# Patient Record
Sex: MALE | NOT HISPANIC OR LATINO | URBAN - METROPOLITAN AREA
[De-identification: names, ages, dates, MRNs, and addresses within clinical notes are randomized per-mention and may not be internally consistent; named-entity substitution may affect disease eponyms.]

---

## 2023-04-05 ENCOUNTER — INPATIENT (INPATIENT)
Facility: HOSPITAL | Age: 83
LOS: 11 days | Discharge: HOME HEALTH | DRG: 291 | End: 2023-04-17
Attending: HOSPITALIST | Admitting: HOSPITALIST
Payer: MEDICARE

## 2023-04-05 VITALS
OXYGEN SATURATION: 96 % | SYSTOLIC BLOOD PRESSURE: 131 MMHG | RESPIRATION RATE: 22 BRPM | TEMPERATURE: 98 F | DIASTOLIC BLOOD PRESSURE: 67 MMHG | WEIGHT: 289.91 LBS | HEART RATE: 55 BPM

## 2023-04-05 DIAGNOSIS — I48.92 UNSPECIFIED ATRIAL FLUTTER: ICD-10-CM

## 2023-04-05 DIAGNOSIS — I50.33 ACUTE ON CHRONIC DIASTOLIC (CONGESTIVE) HEART FAILURE: ICD-10-CM

## 2023-04-05 DIAGNOSIS — Z90.49 ACQUIRED ABSENCE OF OTHER SPECIFIED PARTS OF DIGESTIVE TRACT: Chronic | ICD-10-CM

## 2023-04-05 DIAGNOSIS — J44.9 CHRONIC OBSTRUCTIVE PULMONARY DISEASE, UNSPECIFIED: ICD-10-CM

## 2023-04-05 DIAGNOSIS — I50.9 HEART FAILURE, UNSPECIFIED: ICD-10-CM

## 2023-04-05 DIAGNOSIS — I10 ESSENTIAL (PRIMARY) HYPERTENSION: ICD-10-CM

## 2023-04-05 DIAGNOSIS — E11.9 TYPE 2 DIABETES MELLITUS WITHOUT COMPLICATIONS: ICD-10-CM

## 2023-04-05 DIAGNOSIS — R00.1 BRADYCARDIA, UNSPECIFIED: ICD-10-CM

## 2023-04-05 DIAGNOSIS — J96.21 ACUTE AND CHRONIC RESPIRATORY FAILURE WITH HYPOXIA: ICD-10-CM

## 2023-04-05 LAB
ALBUMIN SERPL ELPH-MCNC: 3.1 G/DL — LOW (ref 3.3–5.2)
ALP SERPL-CCNC: 110 U/L — SIGNIFICANT CHANGE UP (ref 40–120)
ALT FLD-CCNC: 17 U/L — SIGNIFICANT CHANGE UP
ANION GAP SERPL CALC-SCNC: 8 MMOL/L — SIGNIFICANT CHANGE UP (ref 5–17)
ANISOCYTOSIS BLD QL: SIGNIFICANT CHANGE UP
APTT BLD: 39.3 SEC — HIGH (ref 27.5–35.5)
AST SERPL-CCNC: 21 U/L — SIGNIFICANT CHANGE UP
BASE EXCESS BLDA CALC-SCNC: 19.8 MMOL/L — HIGH (ref -2–3)
BASOPHILS # BLD AUTO: 0.03 K/UL — SIGNIFICANT CHANGE UP (ref 0–0.2)
BASOPHILS NFR BLD AUTO: 0.4 % — SIGNIFICANT CHANGE UP (ref 0–2)
BILIRUB SERPL-MCNC: 0.6 MG/DL — SIGNIFICANT CHANGE UP (ref 0.4–2)
BLOOD GAS COMMENTS ARTERIAL: SIGNIFICANT CHANGE UP
BUN SERPL-MCNC: 21.7 MG/DL — HIGH (ref 8–20)
CALCIUM SERPL-MCNC: 8.7 MG/DL — SIGNIFICANT CHANGE UP (ref 8.4–10.5)
CHLORIDE SERPL-SCNC: 94 MMOL/L — LOW (ref 96–108)
CO2 SERPL-SCNC: 37 MMOL/L — HIGH (ref 22–29)
CREAT SERPL-MCNC: 1.01 MG/DL — SIGNIFICANT CHANGE UP (ref 0.5–1.3)
EGFR: 74 ML/MIN/1.73M2 — SIGNIFICANT CHANGE UP
EOSINOPHIL # BLD AUTO: 0.11 K/UL — SIGNIFICANT CHANGE UP (ref 0–0.5)
EOSINOPHIL NFR BLD AUTO: 1.4 % — SIGNIFICANT CHANGE UP (ref 0–6)
GAS PNL BLDA: SIGNIFICANT CHANGE UP
GLUCOSE BLDC GLUCOMTR-MCNC: 125 MG/DL — HIGH (ref 70–99)
GLUCOSE SERPL-MCNC: 100 MG/DL — HIGH (ref 70–99)
HCO3 BLDA-SCNC: 46 MMOL/L — CRITICAL HIGH (ref 21–28)
HCT VFR BLD CALC: 41.3 % — SIGNIFICANT CHANGE UP (ref 39–50)
HGB BLD-MCNC: 13.1 G/DL — SIGNIFICANT CHANGE UP (ref 13–17)
HOROWITZ INDEX BLDA+IHG-RTO: SIGNIFICANT CHANGE UP
HYPOCHROMIA BLD QL: SLIGHT — SIGNIFICANT CHANGE UP
IMM GRANULOCYTES NFR BLD AUTO: 0.3 % — SIGNIFICANT CHANGE UP (ref 0–0.9)
INR BLD: 1.83 RATIO — HIGH (ref 0.88–1.16)
LYMPHOCYTES # BLD AUTO: 1.6 K/UL — SIGNIFICANT CHANGE UP (ref 1–3.3)
LYMPHOCYTES # BLD AUTO: 20.9 % — SIGNIFICANT CHANGE UP (ref 13–44)
MACROCYTES BLD QL: SIGNIFICANT CHANGE UP
MANUAL SMEAR VERIFICATION: SIGNIFICANT CHANGE UP
MCHC RBC-ENTMCNC: 31.7 GM/DL — LOW (ref 32–36)
MCHC RBC-ENTMCNC: 34.1 PG — HIGH (ref 27–34)
MCV RBC AUTO: 107.6 FL — HIGH (ref 80–100)
MONOCYTES # BLD AUTO: 0.6 K/UL — SIGNIFICANT CHANGE UP (ref 0–0.9)
MONOCYTES NFR BLD AUTO: 7.9 % — SIGNIFICANT CHANGE UP (ref 2–14)
NEUTROPHILS # BLD AUTO: 5.28 K/UL — SIGNIFICANT CHANGE UP (ref 1.8–7.4)
NEUTROPHILS NFR BLD AUTO: 69.1 % — SIGNIFICANT CHANGE UP (ref 43–77)
NT-PROBNP SERPL-SCNC: 1652 PG/ML — HIGH (ref 0–300)
PCO2 BLDA: 89 MMHG — CRITICAL HIGH (ref 35–48)
PH BLDA: 7.32 — LOW (ref 7.35–7.45)
PLAT MORPH BLD: NORMAL — SIGNIFICANT CHANGE UP
PLATELET # BLD AUTO: 99 K/UL — LOW (ref 150–400)
PO2 BLDA: 90 MMHG — SIGNIFICANT CHANGE UP (ref 83–108)
POLYCHROMASIA BLD QL SMEAR: SLIGHT — SIGNIFICANT CHANGE UP
POTASSIUM SERPL-MCNC: 4 MMOL/L — SIGNIFICANT CHANGE UP (ref 3.5–5.3)
POTASSIUM SERPL-SCNC: 4 MMOL/L — SIGNIFICANT CHANGE UP (ref 3.5–5.3)
PROT SERPL-MCNC: 6.9 G/DL — SIGNIFICANT CHANGE UP (ref 6.6–8.7)
PROTHROM AB SERPL-ACNC: 21.3 SEC — HIGH (ref 10.5–13.4)
RBC # BLD: 3.84 M/UL — LOW (ref 4.2–5.8)
RBC # FLD: 13.6 % — SIGNIFICANT CHANGE UP (ref 10.3–14.5)
RBC BLD AUTO: ABNORMAL
SAO2 % BLDA: 98.1 % — HIGH (ref 94–98)
SARS-COV-2 RNA SPEC QL NAA+PROBE: SIGNIFICANT CHANGE UP
SODIUM SERPL-SCNC: 139 MMOL/L — SIGNIFICANT CHANGE UP (ref 135–145)
TROPONIN T SERPL-MCNC: 0.02 NG/ML — SIGNIFICANT CHANGE UP (ref 0–0.06)
WBC # BLD: 7.64 K/UL — SIGNIFICANT CHANGE UP (ref 3.8–10.5)
WBC # FLD AUTO: 7.64 K/UL — SIGNIFICANT CHANGE UP (ref 3.8–10.5)

## 2023-04-05 PROCEDURE — 71045 X-RAY EXAM CHEST 1 VIEW: CPT | Mod: 26

## 2023-04-05 PROCEDURE — 93010 ELECTROCARDIOGRAM REPORT: CPT

## 2023-04-05 PROCEDURE — 99223 1ST HOSP IP/OBS HIGH 75: CPT

## 2023-04-05 PROCEDURE — 93306 TTE W/DOPPLER COMPLETE: CPT | Mod: 26

## 2023-04-05 PROCEDURE — 99291 CRITICAL CARE FIRST HOUR: CPT

## 2023-04-05 RX ORDER — GLUCAGON INJECTION, SOLUTION 0.5 MG/.1ML
4 INJECTION, SOLUTION SUBCUTANEOUS ONCE
Refills: 0 | Status: COMPLETED | OUTPATIENT
Start: 2023-04-05 | End: 2023-04-05

## 2023-04-05 RX ORDER — INSULIN LISPRO 100/ML
VIAL (ML) SUBCUTANEOUS AT BEDTIME
Refills: 0 | Status: ACTIVE | OUTPATIENT
Start: 2023-04-05 | End: 2024-03-03

## 2023-04-05 RX ORDER — FUROSEMIDE 40 MG
80 TABLET ORAL EVERY 12 HOURS
Refills: 0 | Status: DISCONTINUED | OUTPATIENT
Start: 2023-04-05 | End: 2023-04-09

## 2023-04-05 RX ORDER — ALBUTEROL 90 UG/1
2 AEROSOL, METERED ORAL
Refills: 0 | DISCHARGE

## 2023-04-05 RX ORDER — APIXABAN 2.5 MG/1
5 TABLET, FILM COATED ORAL
Refills: 0 | Status: DISCONTINUED | OUTPATIENT
Start: 2023-04-05 | End: 2023-04-06

## 2023-04-05 RX ORDER — FUROSEMIDE 40 MG
80 TABLET ORAL ONCE
Refills: 0 | Status: COMPLETED | OUTPATIENT
Start: 2023-04-05 | End: 2023-04-05

## 2023-04-05 RX ORDER — APIXABAN 2.5 MG/1
1 TABLET, FILM COATED ORAL
Refills: 0 | DISCHARGE

## 2023-04-05 RX ORDER — GLUCAGON INJECTION, SOLUTION 0.5 MG/.1ML
1 INJECTION, SOLUTION SUBCUTANEOUS ONCE
Refills: 0 | Status: ACTIVE | OUTPATIENT
Start: 2023-04-05 | End: 2024-03-03

## 2023-04-05 RX ORDER — DEXTROSE 50 % IN WATER 50 %
25 SYRINGE (ML) INTRAVENOUS ONCE
Refills: 0 | Status: ACTIVE | OUTPATIENT
Start: 2023-04-05

## 2023-04-05 RX ORDER — IPRATROPIUM/ALBUTEROL SULFATE 18-103MCG
3 AEROSOL WITH ADAPTER (GRAM) INHALATION
Refills: 0 | Status: ACTIVE | OUTPATIENT
Start: 2023-04-05 | End: 2024-03-03

## 2023-04-05 RX ORDER — INSULIN LISPRO 100/ML
VIAL (ML) SUBCUTANEOUS
Refills: 0 | Status: ACTIVE | OUTPATIENT
Start: 2023-04-05 | End: 2024-03-03

## 2023-04-05 RX ORDER — ONDANSETRON 8 MG/1
8 TABLET, FILM COATED ORAL ONCE
Refills: 0 | Status: COMPLETED | OUTPATIENT
Start: 2023-04-05 | End: 2023-04-05

## 2023-04-05 RX ORDER — LOSARTAN POTASSIUM 100 MG/1
25 TABLET, FILM COATED ORAL DAILY
Refills: 0 | Status: DISCONTINUED | OUTPATIENT
Start: 2023-04-05 | End: 2023-04-07

## 2023-04-05 RX ORDER — BUMETANIDE 0.25 MG/ML
1 INJECTION INTRAMUSCULAR; INTRAVENOUS EVERY 12 HOURS
Refills: 0 | Status: DISCONTINUED | OUTPATIENT
Start: 2023-04-05 | End: 2023-04-05

## 2023-04-05 RX ORDER — DEXTROSE 50 % IN WATER 50 %
15 SYRINGE (ML) INTRAVENOUS ONCE
Refills: 0 | Status: ACTIVE | OUTPATIENT
Start: 2023-04-05 | End: 2024-03-03

## 2023-04-05 RX ORDER — CALCIUM GLUCONATE 100 MG/ML
2 VIAL (ML) INTRAVENOUS ONCE
Refills: 0 | Status: COMPLETED | OUTPATIENT
Start: 2023-04-05 | End: 2023-04-05

## 2023-04-05 RX ORDER — TAMSULOSIN HYDROCHLORIDE 0.4 MG/1
1 CAPSULE ORAL
Refills: 0 | DISCHARGE

## 2023-04-05 RX ORDER — TAMSULOSIN HYDROCHLORIDE 0.4 MG/1
0.4 CAPSULE ORAL AT BEDTIME
Refills: 0 | Status: DISCONTINUED | OUTPATIENT
Start: 2023-04-05 | End: 2023-04-06

## 2023-04-05 RX ORDER — SODIUM CHLORIDE 9 MG/ML
1000 INJECTION, SOLUTION INTRAVENOUS
Refills: 0 | Status: ACTIVE | OUTPATIENT
Start: 2023-04-05 | End: 2024-03-03

## 2023-04-05 RX ORDER — GLIMEPIRIDE 1 MG
1 TABLET ORAL
Refills: 0 | DISCHARGE

## 2023-04-05 RX ORDER — IPRATROPIUM/ALBUTEROL SULFATE 18-103MCG
3 AEROSOL WITH ADAPTER (GRAM) INHALATION EVERY 6 HOURS
Refills: 0 | Status: ACTIVE | OUTPATIENT
Start: 2023-04-05 | End: 2024-03-03

## 2023-04-05 RX ORDER — DEXTROSE 50 % IN WATER 50 %
12.5 SYRINGE (ML) INTRAVENOUS ONCE
Refills: 0 | Status: ACTIVE | OUTPATIENT
Start: 2023-04-05

## 2023-04-05 RX ADMIN — Medication 3 MILLILITER(S): at 21:04

## 2023-04-05 RX ADMIN — APIXABAN 5 MILLIGRAM(S): 2.5 TABLET, FILM COATED ORAL at 17:56

## 2023-04-05 RX ADMIN — TAMSULOSIN HYDROCHLORIDE 0.4 MILLIGRAM(S): 0.4 CAPSULE ORAL at 21:42

## 2023-04-05 RX ADMIN — GLUCAGON INJECTION, SOLUTION 4 MILLIGRAM(S): 0.5 INJECTION, SOLUTION SUBCUTANEOUS at 13:24

## 2023-04-05 RX ADMIN — Medication 80 MILLIGRAM(S): at 17:55

## 2023-04-05 RX ADMIN — Medication 200 GRAM(S): at 13:24

## 2023-04-05 RX ADMIN — Medication 3 MILLILITER(S): at 16:43

## 2023-04-05 RX ADMIN — ONDANSETRON 8 MILLIGRAM(S): 8 TABLET, FILM COATED ORAL at 13:19

## 2023-04-05 NOTE — ED ADULT NURSE NOTE - CHIEF COMPLAINT
Kick Counts in Pregnancy   WHAT YOU NEED TO KNOW:   What do I need to know about kick counts? Kick counts measure how much your baby is moving in your womb  A kick from your baby can be felt as a twist, turn, swish, roll, or jab  It is common to feel your baby kicking at 26 to 28 weeks of pregnancy  You may feel your baby kick as early as 20 weeks of pregnancy  You may want to start counting at 28 weeks  Why should I measure kick counts? Your baby's movement may provide information about your baby's health  He or she may move less, or not at all, if there are problems  Your baby may move less if he or she is not getting enough oxygen or nutrition from the placenta  Do not smoke while you are pregnant  Smoking decreases the amount of oxygen that gets to your baby  Talk to your healthcare provider if you need help to quit smoking  Tell your healthcare provider as soon as you feel a change in your baby's movements  When do I measure kick counts? · Measure kick counts at the same time every day  · Measure kick counts when your baby is awake and most active  Your baby may be most active in the evening  How do I measure kick counts? Check that your baby is awake before you measure kick counts  You can wake up your baby by lightly pushing on your belly, walking, or drinking something cold  Your healthcare provider may tell you different ways to measure kick counts  You may be told to do the following:  · Use a chart or clock to keep track of the time you start and finish counting  · Sit in a chair or lie on your left side  · Place your hands on the largest part of your belly  · Count until you reach 10 kicks  Write down how much time it takes to count 10 kicks  · It may take 30 minutes to 2 hours to count 10 kicks  It should not take more than 2 hours to count 10 kicks  When should I contact my healthcare provider? · You feel a change in the number of kicks or movements of your baby  · You feel fewer than 10 kicks within 2 hours  · You have questions or concerns about your baby's movements  CARE AGREEMENT:   You have the right to help plan your care  Learn about your health condition and how it may be treated  Discuss treatment options with your healthcare providers to decide what care you want to receive  You always have the right to refuse treatment  The above information is an  only  It is not intended as medical advice for individual conditions or treatments  Talk to your doctor, nurse or pharmacist before following any medical regimen to see if it is safe and effective for you  © Copyright [employer] 2021 Information is for End User's use only and may not be sold, redistributed or otherwise used for commercial purposes   All illustrations and images included in CareNotes® are the copyrighted property of [employer]  or [address] [place] [address] The patient is a 83y Male complaining of swelling of lower extremities.

## 2023-04-05 NOTE — ED PROVIDER NOTE - NS ED ROS FT
Gen: denies fever, chills, fatigue, weight loss  Skin: denies rashes, laceration, bruising  HEENT: denies visual changes, ear pain, nasal congestion, throat pain  Respiratory: +SOB, +HOLT. Denies cough, wheezing  Cardiovascular: +LE Edema. denies chest pain, palpitations, diaphoresis  GI: denies abdominal pain, n/v/d  : denies dysuria, frequency, urgency, bowel/bladder incontinence  MSK: denies joint swelling/pain, back pain, neck pain  Neuro: denies headache, dizziness, weakness, numbness  Psych: denies anxiety, depression, SI/HI, visual/auditory hallucinations

## 2023-04-05 NOTE — H&P ADULT - NSHPLABSRESULTS_GEN_ALL_CORE
Personally reviewed  EKG showing Atrial Flutter with variable VR  Chest X-Ray with cardiomegaly with congestive changes

## 2023-04-05 NOTE — ED ADULT TRIAGE NOTE - CHIEF COMPLAINT QUOTE
C/O increasing bilateral lower extremity swelling for the past few days. PT has had an increase in his Lasix but has had no relief. Denies Increase SOB or chest pain. On 4L NC at baseline.

## 2023-04-05 NOTE — H&P ADULT - NSHPPHYSICALEXAM_GEN_ALL_CORE
OBJECTIVE:  Vital Signs Last 24 Hrs  T(C): 36.5 (05 Apr 2023 10:53), Max: 36.5 (05 Apr 2023 10:53)  T(F): 97.7 (05 Apr 2023 10:53), Max: 97.7 (05 Apr 2023 10:53)  HR: 38 (05 Apr 2023 14:23) (33 - 55)  BP: 111/62 (05 Apr 2023 14:23) (95/52 - 131/67)   RR: 24 (05 Apr 2023 14:23) (22 - 27)  SpO2: 96% (05 Apr 2023 14:23) (95% - 97%)    Parameters below as of 05 Apr 2023 14:23  Patient On (Oxygen Delivery Method): nasal cannula  O2 Flow (L/min): 4      PHYSICAL EXAMINATION  General: Obese male, sitting up on stretcher, fatigued  HEENT:  5LNC. EOMI  NECK:  Supple  CVS: Bradycardiac  RESP:  coarse breath sounds, rhonchi diffusely  GI:  Soft protuberant nontender BS+  : So suprapubic tenderness  MSK: Bilateral LE edema. Moves all extremities  CNS:  AOx3. No gross focal or global deficit though somewhat slow to respond  INTEG:  warm dry   PSYCH:  Fatigued

## 2023-04-05 NOTE — PATIENT PROFILE ADULT - FALL HARM RISK - FACTORS
Impaired gait/IV and/or equipment tethered to patient/Orthostatic hypotension/Other medical problems

## 2023-04-05 NOTE — ED PROVIDER NOTE - CARE PLAN
Principal Discharge DX:	CHF exacerbation  Secondary Diagnosis:	New onset atrial flutter  Secondary Diagnosis:	Bradycardia   1

## 2023-04-05 NOTE — ED PROVIDER NOTE - OBJECTIVE STATEMENT
83-year-old male PMHx CHF, COPD on 2–3 L O2, DM presents to ED complaining of lower extremity edema and HOLT.  Patient reports increasing leg swelling over the past few weeks however states it has been much worse the past 2 to 3 days.  States his daughter brought him to [facility] office this morning due to increased leg swelling and was told he should go to the hospital because he is already on 80 mg of Lasix daily and would likely require admission for a few days for diuresis.  States that patient was also noted to have a heart rate in the 40s a few weeks ago, monitors it via pulse ox at home, states his cardiologist is aware.  Denies fever, chills, sputum production, CP, diaphoresis, N/V/D, ABD pain.  Cardiologist: Dr. Pang 83-year-old male PMHx CHF, COPD on 2–3 L O2, DM, DVT on eliquis presents to ED complaining of lower extremity edema and HOLT.  Patient reports increasing leg swelling over the past few weeks however states it has been much worse the past 2 to 3 days.  States his daughter brought him to [facility] office this morning due to increased leg swelling and was told he should go to the hospital because he is already on 80 mg of Lasix daily and would likely require admission for a few days for diuresis.  States that patient was also noted to have a heart rate in the 40s a few weeks ago, monitors it via pulse ox at home, states his cardiologist is aware.  Denies fever, chills, sputum production, CP, diaphoresis, N/V/D, ABD pain.  Cardiologist: Dr. Pang

## 2023-04-05 NOTE — CONSULT NOTE ADULT - ASSESSMENT
84 y/o male with h/o chronic HfpEF ACC/AHA stage C, COPD on home O2 (4 LPM via NC) since July 2022, former smoker (stopped 7/2022), Dm2, DVT on Eliquis who was referred from Dr. Booker's office due to ADHF. Upon arrival to the ED he was found to have newly diagnosed Aflutter with variable block (VR 30-40s). His BP was stable and he denied symptoms associated to bradycardia. His labs were remarkable for elevated pBNP, normal trop, thrombocytopenia and elevated CO2. His CxR showed pulmonary congestion. He was given IV lasix, glucagon and calcium. His AV node blocker agents were held.

## 2023-04-05 NOTE — H&P ADULT - ASSESSMENT
83 year old male with PMH HTN, T2DM, CHFpEF, COPD on 2LNC, DVT on Apixaban presented with worsening dyspnea and pedal edema.  EKG with Atrial flutter with slow VR and Chest X-Ray with congestion    Acute on Chronic CHF  Reported pEF  - Admit to telemetry  - I/O, Daily weight, fluid and salt restriction  - Furosemide 80mg IV q12  - Metolazone 5mg daily x 3  - TTE  - Heart Failure Consult    Atrial Flutter  Bradycardia  - Monitor  - Hold Metoprolol  - Apixaban    Acute on chronic hypoxic respiratory failure  COPD  Elevated HCO3  -   - Supplemental O2  - DuoNebs  - Check ABG    HTN  - Add losartan  -   T2DM 83 year old male with PMH HTN, T2DM, CHFpEF, COPD on 2LNC, DVT on Apixaban presented with worsening dyspnea and pedal edema.  EKG with Atrial flutter with slow VR and Chest X-Ray with congestion. BNP 1652      Acute on Chronic CHF  Reported pEF  - Admit to telemetry  - I/O, Daily weight, fluid and salt restriction  - Furosemide 80mg IV q12  - Metolazone 5mg daily x 3  - ARb  - Consider SLGT2i  - TTE  - Heart Failure Consult    Atrial Flutter  Bradycardia  - Monitor  - Hold Metoprolol  - Apixaban    Acute on chronic hypoxic respiratory failure  COPD  Elevated HCO3  -   - Supplemental O2  - DuoNebs  - Check ABG    HTN  - Add losartan  - Metoprolol on hold due to bradycardia    T2DM  - Carbohydrate consistent diet  - Hold home OHA  - BGM with SSI  - A1c in am    VTE prophylaxis  - Already on Apixaban  Incentive Spirometry  OOB    Guarded Prognosis  Palliative Consult

## 2023-04-05 NOTE — ED PROVIDER NOTE - NSICDXPASTMEDICALHX_GEN_ALL_CORE_FT
PAST MEDICAL HISTORY:  Congestive heart failure (CHF)     COPD (chronic obstructive pulmonary disease)     DM (diabetes mellitus)

## 2023-04-05 NOTE — H&P ADULT - HISTORY OF PRESENT ILLNESS
83 year old male with PMH HTN, T2DM, CHFpEF, COPD on 2LNC, DVT on Apixaban presented with worsening dyspnea and pedal edema. Also with low heart rate recently. He states he's been worse for past couple days although he's been sick for a couple of months and [facility]  Denies any dizziness or chest pain but has cough productive of white phlegm. Denies any dietary indiscretion or medications non-compliance.

## 2023-04-05 NOTE — H&P ADULT - NSICDXPASTMEDICALHX_GEN_ALL_CORE_FT
PAST MEDICAL HISTORY:  BPH (benign prostatic hyperplasia)     Congestive heart failure (CHF)     COPD (chronic obstructive pulmonary disease)     DM (diabetes mellitus)     HTN (hypertension)     
no

## 2023-04-05 NOTE — ED PROVIDER NOTE - PROGRESS NOTE DETAILS
HR 36 on monitor. EKG aflutter. pt moved to critical care room on monitor. Spoke with Dr. Griffith, recommending holding AV theresa blocking agents, requesting HF consult. States Dr. Pang from EP will see pt in AM. Spoke with heart failure cardiologist Dr. Carreon, agrees with plan thus far, will see pt in afternoon today.

## 2023-04-05 NOTE — H&P ADULT - NSHPSOCIALHISTORY_GEN_ALL_CORE
Pierre has 1 daughter - moved in with her 2 months ago  Quit smoking last year, Doesn't drink or do drugs

## 2023-04-05 NOTE — ED PROVIDER NOTE - CLINICAL SUMMARY MEDICAL DECISION MAKING FREE TEXT BOX
83-year-old male presenting with lower extremity edema and HOLT.  [facility] in from [facility] for diuresis.  Labs and CXR ordered.  Given 80 mg IV Lasix in the ED 83-year-old male presenting with lower extremity edema and HOLT.  Sent in from [facility] for diuresis.  Labs and CXR ordered.  Given 80 mg IV Lasix in the ED. HR 36 on monitor. EKG aflutter, new onset as per [facility]. pt moved to critical care room on monitor and given glucagon and calcium gluconate. Spoke with Dr. Griffith, recommending holding AV theresa blocking agents, requesting HF consult. States Dr. Pang from EP will see pt in AM. Dr. Carreon Heart failure cardiology to see pt today. Labs without emergent findings, CXR shows central congestion. admitted to hospitalist for further management

## 2023-04-05 NOTE — CONSULT NOTE ADULT - PROBLEM SELECTOR RECOMMENDATION 9
Multiple recent HF related hospitalizations. Could be exacerbated by new onset aflutter/bradycardia.   Clinically hypervolemic  Agree with TTE  Diuretics: lasix 80mg IV BID. Low threshold to switch to Bumex. Will consider adding Diamox based on response.   GDMT: plan to add SGLT2+MRA  Would benefit from CardioMEMs to avoid recurrent hospitalizations  Recommend rhythm control

## 2023-04-05 NOTE — PATIENT PROFILE ADULT - FALL HARM RISK - HARM RISK INTERVENTIONS
Assistance with ambulation/Assistance OOB with selected safe patient handling equipment/Communicate Risk of Fall with Harm to all staff/Discuss with provider need for PT consult/Monitor gait and stability/Provide patient with walking aids - walker, cane, crutches/Reinforce activity limits and safety measures with patient and family/Sit up slowly, dangle for a short time, stand at bedside before walking/Tailored Fall Risk Interventions/Visual Cue: Yellow wristband and red socks/Bed in lowest position, wheels locked, appropriate side rails in place/Call bell, personal items and telephone in reach/Instruct patient to call for assistance before getting out of bed or chair/Non-slip footwear when patient is out of bed/[place] to call system/Physically safe environment - no spills, clutter or unnecessary equipment/Purposeful Proactive Rounding/Room/bathroom lighting operational, light cord in reach

## 2023-04-05 NOTE — ED ADULT NURSE NOTE - OBJECTIVE STATEMENT
a&ox4  sent by [facility] office this AM s/p "bradycardic, worsening cough, short of breath and for diuresis"  uses 2L NC at baseline ; hx copd , 4+ BLE    takes 80mg lasix daily last dose 40mg this AM a&ox4  sent by [facility] office this AM for admission daughter reports pt was bradycardic, worsening cough, short of breath and needs diuresis  on arrival pt bradycardic hr 30-40's, asymptomatic ; + wet cough and congestion  uses 4L NC at baseline ; hx copd , 4+ BLE    takes 80mg lasix daily last dose 40mg this AM  pt reports took "2 pressure meds not sure which one"

## 2023-04-05 NOTE — ED PROVIDER NOTE - CRITICAL CARE ATTENDING CONTRIBUTION TO CARE
I have personally provided _45__ minutes of critical care time exclusive of time spent on separately billable procedures. Time includes review of laboratory data, radiology results, discussion with consultants, and monitoring for potential decompensation. Interventions were performed as documented above    This was a shared visit with JORGE A. I reviewed and verified the documentation and independently performed the documented history/exam/mdm.    Elderly male on home o2 for copd with known CHF now with worsening edema, SOB, HOLT, and noted braducardia; on exam, renetta to 30s, aflutter; rales b/l lung fields; abd soft nt nd; +2+pedal edema; lasix for fluid overload; glucagon and calcium to try to reverse current meds; otherwise hemodynamically stable/acceptable; cards consulted; TBA stepdown unit

## 2023-04-05 NOTE — CONSULT NOTE ADULT - SUBJECTIVE AND OBJECTIVE BOX
HPI  84 y/o male with h/o chronic HfpEF ACC/AHA stage C, COPD on home O2 (4 LPM via NC) since July 2022, former smoker (stopped 7/2022), Dm2, DVT on Eliquis who was referred from Dr. Booker's office due to worsening Le edema and HOLT. Upon arrival to the ED, he was found to have aflutter with variable block VR 30-40s. His BP was stable and the pt denied symptoms related to bradycardia. He reports cough with phlegm w/o fever or chills.  This is his 3rd or 4th hospitalization in the past 12 month. He has been admitted multiple times to [facility].     Labs upon admission remarkable for elevated pBNP, normal trop, thrombocytopenia and elevated cO2. He received IV lasix 80mg, glucagon and calcium while in the ED.     Medications:  albuterol/ipratropium for Nebulization 3 milliLiter(s) Nebulizer every 6 hours  albuterol/ipratropium for Nebulization 3 milliLiter(s) Nebulizer every 3 hours PRN  apixaban 5 milliGRAM(s) Oral two times a day  dextrose 5%. 1000 milliLiter(s) IV Continuous <Continuous>  dextrose 5%. 1000 milliLiter(s) IV Continuous <Continuous>  dextrose 50% Injectable 25 Gram(s) IV Push once  dextrose 50% Injectable 12.5 Gram(s) IV Push once  dextrose 50% Injectable 25 Gram(s) IV Push once  dextrose Oral Gel 15 Gram(s) Oral once PRN  furosemide   Injectable 80 milliGRAM(s) IV Push every 12 hours  glucagon  Injectable 1 milliGRAM(s) IntraMuscular once  insulin lispro (ADMELOG) corrective regimen sliding scale   SubCutaneous three times a day before meals  insulin lispro (ADMELOG) corrective regimen sliding scale   SubCutaneous at bedtime  losartan 25 milliGRAM(s) Oral daily  metolazone 5 milliGRAM(s) Oral daily  tamsulosin 0.4 milliGRAM(s) Oral at bedtime    Vitals:  T(C): 36.5 (04-05-23 @ 10:53), Max: 36.5 (04-05-23 @ 10:53)  HR: 41 (04-05-23 @ 19:55) (33 - 55)  BP: 112/54 (04-05-23 @ 19:55) (95/52 - 131/67)  BP(mean): 70 (04-05-23 @ 17:53) (70 - 70)  ABP: --  ABP(mean): --  RR: 20 (04-05-23 @ 19:55) (20 - 27)  SpO2: 91% (04-05-23 @ 19:55) (91% - 98%)  Wt(kg): --  CVP(cm H2O): --  CO: --  CI: --  PA: --  PA(mean): --  PCWP: --  SVR: --  PVR: --    Daily     Daily     Weight (kg): 131.5 (04-05 @ 10:53)    I&O's Summary    05 Apr 2023 07:01  -  05 Apr 2023 20:23  --------------------------------------------------------  IN: 240 mL / OUT: 0 mL / NET: 240 mL        Physical Exam:  Appearance: No Acute Distress  HEENT: JVP up to jaw level  Cardiovascular: bradycardia, no murmurs noted  Respiratory: decreased breath sounds throughout, minimal exp wheezing  Gastrointestinal: Soft, Non-tender, non-distended	  Skin: no skin lesions  Neurologic: Non-focal  Extremities: +2 b/l Le edema  Psychiatry: A & O x 3, Mood & affect appropriate      Labs:                        13.1   7.64  )-----------( 99       ( 05 Apr 2023 01:06 )             41.3     04-05    139  |  94<L>  |  21.7<H>  ----------------------------<  100<H>  4.0   |  37.0<H>  |  1.01    Ca    8.7      05 Apr 2023 01:06    TPro  6.9  /  Alb  3.1<L>  /  TBili  0.6  /  DBili  x   /  AST  21  /  ALT  17  /  AlkPhos  110  04-05      PT/INR - ( 05 Apr 2023 01:06 )   PT: 21.3 sec;   INR: 1.83 ratio         PTT - ( 05 Apr 2023 01:06 )  PTT:39.3 sec  CARDIAC MARKERS ( 05 Apr 2023 01:06 )  x     / 0.02 ng/mL / x     / x     / x                      TELEMETRY:    [ ] Echocardiogram:   [facility] Advanced Heart Failure  [address] [place] [address] Office Phone: [phone]/Fax: [phone]  Service/On Call Phone [phone]      Ref Provider: Dr. Kelley Booker ([facility])    HPI  84 y/o male with h/o chronic HfpEF ACC/AHA stage C, COPD on home O2 (4 LPM via NC) since July 2022, former smoker (stopped 7/2022), Dm2, DVT on Eliquis who was referred from Dr. Booker's office due to worsening Le edema and HOLT. Upon arrival to the ED, he was found to have aflutter with variable block VR 30-40s. His BP was stable and the pt denied symptoms related to bradycardia. He reports cough with phlegm w/o fever or chills.  This is his 3rd or 4th hospitalization in the past 12 month. He has been admitted multiple times to [facility].     Labs upon admission remarkable for elevated pBNP, normal trop, thrombocytopenia and elevated cO2. He received IV lasix 80mg, glucagon and calcium while in the ED.     Medications:  albuterol/ipratropium for Nebulization 3 milliLiter(s) Nebulizer every 6 hours  albuterol/ipratropium for Nebulization 3 milliLiter(s) Nebulizer every 3 hours PRN  apixaban 5 milliGRAM(s) Oral two times a day  dextrose 5%. 1000 milliLiter(s) IV Continuous <Continuous>  dextrose 5%. 1000 milliLiter(s) IV Continuous <Continuous>  dextrose 50% Injectable 25 Gram(s) IV Push once  dextrose 50% Injectable 12.5 Gram(s) IV Push once  dextrose 50% Injectable 25 Gram(s) IV Push once  dextrose Oral Gel 15 Gram(s) Oral once PRN  furosemide   Injectable 80 milliGRAM(s) IV Push every 12 hours  glucagon  Injectable 1 milliGRAM(s) IntraMuscular once  insulin lispro (ADMELOG) corrective regimen sliding scale   SubCutaneous three times a day before meals  insulin lispro (ADMELOG) corrective regimen sliding scale   SubCutaneous at bedtime  losartan 25 milliGRAM(s) Oral daily  metolazone 5 milliGRAM(s) Oral daily  tamsulosin 0.4 milliGRAM(s) Oral at bedtime    Vitals:  T(C): 36.5 (04-05-23 @ 10:53), Max: 36.5 (04-05-23 @ 10:53)  HR: 41 (04-05-23 @ 19:55) (33 - 55)  BP: 112/54 (04-05-23 @ 19:55) (95/52 - 131/67)  BP(mean): 70 (04-05-23 @ 17:53) (70 - 70)  ABP: --  ABP(mean): --  RR: 20 (04-05-23 @ 19:55) (20 - 27)  SpO2: 91% (04-05-23 @ 19:55) (91% - 98%)  Wt(kg): --  CVP(cm H2O): --  CO: --  CI: --  PA: --  PA(mean): --  PCWP: --  SVR: --  PVR: --    Daily     Daily     Weight (kg): 131.5 (04-05 @ 10:53)    I&O's Summary    05 Apr 2023 07:01  -  05 Apr 2023 20:23  --------------------------------------------------------  IN: 240 mL / OUT: 0 mL / NET: 240 mL        Physical Exam:  Appearance: No Acute Distress  HEENT: JVP up to jaw level  Cardiovascular: bradycardia, no murmurs noted  Respiratory: decreased breath sounds throughout, minimal exp wheezing  Gastrointestinal: Soft, Non-tender, non-distended	  Skin: no skin lesions  Neurologic: Non-focal  Extremities: +2 b/l Le edema  Psychiatry: A & O x 3, Mood & affect appropriate      Labs:                        13.1   7.64  )-----------( 99       ( 05 Apr 2023 01:06 )             41.3     04-05    139  |  94<L>  |  21.7<H>  ----------------------------<  100<H>  4.0   |  37.0<H>  |  1.01    Ca    8.7      05 Apr 2023 01:06    TPro  6.9  /  Alb  3.1<L>  /  TBili  0.6  /  DBili  x   /  AST  21  /  ALT  17  /  AlkPhos  110  04-05      PT/INR - ( 05 Apr 2023 01:06 )   PT: 21.3 sec;   INR: 1.83 ratio         PTT - ( 05 Apr 2023 01:06 )  PTT:39.3 sec  CARDIAC MARKERS ( 05 Apr 2023 01:06 )  x     / 0.02 ng/mL / x     / x     / x                      TELEMETRY:    [ ] Echocardiogram:

## 2023-04-05 NOTE — ED CLERICAL - CLERICAL COMMENTS
[facility] called for consult [facility] called for consult, and also advanced heart failure service consulted

## 2023-04-05 NOTE — ED PROVIDER NOTE - PHYSICAL EXAMINATION
Gen: No acute distress, non toxic  HENT: NCAT, Mucous membranes moist, Oropharynx without exudates, uvula midline  Eyes: pink conjunctivae, EOMI, PERRL  CV: RRR, nl s1/s2. 3+ pitting edema b/l LE  Resp: on 3L O2 via NC. diminished breath sounds, crackles at bases.   GI: Abdomen soft, NT, ND. No rebound, no guarding  : No CVAT  Neuro: A&O x 3, CN II-XII intact, sensorimotor intact without deficits   MSK: No spine or joint tenderness to palpation, Full ROM ext x 4  Skin: No rashes. intact and perfused.

## 2023-04-05 NOTE — CONSULT NOTE ADULT - PROBLEM SELECTOR RECOMMENDATION 2
New onset  Variable block  AVN blocker agents HELD  Agree with EP c/s  Recommend rhythm control if possible  AC: eliquis

## 2023-04-06 DIAGNOSIS — E66.2 MORBID (SEVERE) OBESITY WITH ALVEOLAR HYPOVENTILATION: ICD-10-CM

## 2023-04-06 DIAGNOSIS — J96.21 ACUTE AND CHRONIC RESPIRATORY FAILURE WITH HYPOXIA: ICD-10-CM

## 2023-04-06 LAB
A1C WITH ESTIMATED AVERAGE GLUCOSE RESULT: 5.9 % — HIGH (ref 4–5.6)
ALBUMIN SERPL ELPH-MCNC: 3.4 G/DL — SIGNIFICANT CHANGE UP (ref 3.3–5.2)
ALP SERPL-CCNC: 119 U/L — SIGNIFICANT CHANGE UP (ref 40–120)
ALT FLD-CCNC: 17 U/L — SIGNIFICANT CHANGE UP
ANION GAP SERPL CALC-SCNC: 8 MMOL/L — SIGNIFICANT CHANGE UP (ref 5–17)
APPEARANCE UR: CLEAR — SIGNIFICANT CHANGE UP
APTT BLD: 37.3 SEC — HIGH (ref 27.5–35.5)
APTT BLD: >200 SEC — CRITICAL HIGH (ref 27.5–35.5)
AST SERPL-CCNC: 20 U/L — SIGNIFICANT CHANGE UP
BASE EXCESS BLDA CALC-SCNC: 21 MMOL/L — HIGH (ref -2–3)
BASE EXCESS BLDA CALC-SCNC: 21.3 MMOL/L — HIGH (ref -2–3)
BASE EXCESS BLDA CALC-SCNC: 22.3 MMOL/L — HIGH (ref -2–3)
BILIRUB SERPL-MCNC: 0.5 MG/DL — SIGNIFICANT CHANGE UP (ref 0.4–2)
BILIRUB UR-MCNC: NEGATIVE — SIGNIFICANT CHANGE UP
BLOOD GAS COMMENTS ARTERIAL: SIGNIFICANT CHANGE UP
BUN SERPL-MCNC: 25.5 MG/DL — HIGH (ref 8–20)
CALCIUM SERPL-MCNC: 8.9 MG/DL — SIGNIFICANT CHANGE UP (ref 8.4–10.5)
CHLORIDE SERPL-SCNC: 94 MMOL/L — LOW (ref 96–108)
CO2 SERPL-SCNC: 38 MMOL/L — HIGH (ref 22–29)
COLOR SPEC: YELLOW — SIGNIFICANT CHANGE UP
CREAT SERPL-MCNC: 1.33 MG/DL — HIGH (ref 0.5–1.3)
DIFF PNL FLD: ABNORMAL
EGFR: 53 ML/MIN/1.73M2 — LOW
EPI CELLS # UR: SIGNIFICANT CHANGE UP
ESTIMATED AVERAGE GLUCOSE: 123 MG/DL — HIGH (ref 68–114)
FOLATE SERPL-MCNC: 6.7 NG/ML — SIGNIFICANT CHANGE UP
GAS PNL BLDA: SIGNIFICANT CHANGE UP
GLUCOSE BLDC GLUCOMTR-MCNC: 105 MG/DL — HIGH (ref 70–99)
GLUCOSE BLDC GLUCOMTR-MCNC: 110 MG/DL — HIGH (ref 70–99)
GLUCOSE BLDC GLUCOMTR-MCNC: 116 MG/DL — HIGH (ref 70–99)
GLUCOSE BLDC GLUCOMTR-MCNC: 154 MG/DL — HIGH (ref 70–99)
GLUCOSE BLDC GLUCOMTR-MCNC: 157 MG/DL — HIGH (ref 70–99)
GLUCOSE SERPL-MCNC: 105 MG/DL — HIGH (ref 70–99)
GLUCOSE UR QL: NEGATIVE MG/DL — SIGNIFICANT CHANGE UP
HCO3 BLDA-SCNC: 48 MMOL/L — CRITICAL HIGH (ref 21–28)
HCT VFR BLD CALC: 42.3 % — SIGNIFICANT CHANGE UP (ref 39–50)
HCT VFR BLD CALC: 44 % — SIGNIFICANT CHANGE UP (ref 39–50)
HGB BLD-MCNC: 12.9 G/DL — LOW (ref 13–17)
HGB BLD-MCNC: 13.5 G/DL — SIGNIFICANT CHANGE UP (ref 13–17)
HOROWITZ INDEX BLDA+IHG-RTO: SIGNIFICANT CHANGE UP
INR BLD: 1.6 RATIO — HIGH (ref 0.88–1.16)
KETONES UR-MCNC: NEGATIVE — SIGNIFICANT CHANGE UP
LEUKOCYTE ESTERASE UR-ACNC: ABNORMAL
MCHC RBC-ENTMCNC: 30.5 GM/DL — LOW (ref 32–36)
MCHC RBC-ENTMCNC: 30.7 GM/DL — LOW (ref 32–36)
MCHC RBC-ENTMCNC: 33.4 PG — SIGNIFICANT CHANGE UP (ref 27–34)
MCHC RBC-ENTMCNC: 33.8 PG — SIGNIFICANT CHANGE UP (ref 27–34)
MCV RBC AUTO: 108.9 FL — HIGH (ref 80–100)
MCV RBC AUTO: 110.7 FL — HIGH (ref 80–100)
NITRITE UR-MCNC: NEGATIVE — SIGNIFICANT CHANGE UP
PCO2 BLDA: 94 MMHG — CRITICAL HIGH (ref 35–48)
PCO2 BLDA: 97 MMHG — CRITICAL HIGH (ref 35–48)
PCO2 BLDA: 99 MMHG — CRITICAL HIGH (ref 35–48)
PH BLDA: 7.29 — LOW (ref 7.35–7.45)
PH BLDA: 7.3 — LOW (ref 7.35–7.45)
PH BLDA: 7.32 — LOW (ref 7.35–7.45)
PH UR: 5 — SIGNIFICANT CHANGE UP (ref 5–8)
PLATELET # BLD AUTO: 83 K/UL — LOW (ref 150–400)
PLATELET # BLD AUTO: SIGNIFICANT CHANGE UP K/UL (ref 150–400)
PO2 BLDA: 67 MMHG — LOW (ref 83–108)
PO2 BLDA: 74 MMHG — LOW (ref 83–108)
PO2 BLDA: 93 MMHG — SIGNIFICANT CHANGE UP (ref 83–108)
POTASSIUM SERPL-MCNC: 4.2 MMOL/L — SIGNIFICANT CHANGE UP (ref 3.5–5.3)
POTASSIUM SERPL-SCNC: 4.2 MMOL/L — SIGNIFICANT CHANGE UP (ref 3.5–5.3)
PROT SERPL-MCNC: 7.3 G/DL — SIGNIFICANT CHANGE UP (ref 6.6–8.7)
PROT UR-MCNC: NEGATIVE — SIGNIFICANT CHANGE UP
PROTHROM AB SERPL-ACNC: 18.6 SEC — HIGH (ref 10.5–13.4)
RBC # BLD: 3.82 M/UL — LOW (ref 4.2–5.8)
RBC # BLD: 4.04 M/UL — LOW (ref 4.2–5.8)
RBC # FLD: 13.4 % — SIGNIFICANT CHANGE UP (ref 10.3–14.5)
RBC # FLD: 13.5 % — SIGNIFICANT CHANGE UP (ref 10.3–14.5)
RBC CASTS # UR COMP ASSIST: ABNORMAL /HPF (ref 0–4)
SAO2 % BLDA: 93.8 % — LOW (ref 94–98)
SAO2 % BLDA: 96.5 % — SIGNIFICANT CHANGE UP (ref 94–98)
SAO2 % BLDA: 99.1 % — HIGH (ref 94–98)
SODIUM SERPL-SCNC: 140 MMOL/L — SIGNIFICANT CHANGE UP (ref 135–145)
SP GR SPEC: 1.02 — SIGNIFICANT CHANGE UP (ref 1.01–1.02)
UROBILINOGEN FLD QL: NEGATIVE MG/DL — SIGNIFICANT CHANGE UP
VIT B12 SERPL-MCNC: 480 PG/ML — SIGNIFICANT CHANGE UP (ref 232–1245)
WBC # BLD: 7.31 K/UL — SIGNIFICANT CHANGE UP (ref 3.8–10.5)
WBC # BLD: 8.24 K/UL — SIGNIFICANT CHANGE UP (ref 3.8–10.5)
WBC # FLD AUTO: 7.31 K/UL — SIGNIFICANT CHANGE UP (ref 3.8–10.5)
WBC # FLD AUTO: 8.24 K/UL — SIGNIFICANT CHANGE UP (ref 3.8–10.5)
WBC UR QL: SIGNIFICANT CHANGE UP /HPF (ref 0–5)

## 2023-04-06 PROCEDURE — 71045 X-RAY EXAM CHEST 1 VIEW: CPT | Mod: 26

## 2023-04-06 PROCEDURE — 99223 1ST HOSP IP/OBS HIGH 75: CPT

## 2023-04-06 PROCEDURE — 99233 SBSQ HOSP IP/OBS HIGH 50: CPT

## 2023-04-06 PROCEDURE — 99291 CRITICAL CARE FIRST HOUR: CPT

## 2023-04-06 PROCEDURE — 99497 ADVNCD CARE PLAN 30 MIN: CPT | Mod: 25

## 2023-04-06 RX ORDER — HEPARIN SODIUM 5000 [USP'U]/ML
10000 INJECTION INTRAVENOUS; SUBCUTANEOUS EVERY 6 HOURS
Refills: 0 | Status: DISCONTINUED | OUTPATIENT
Start: 2023-04-06 | End: 2023-04-12

## 2023-04-06 RX ORDER — HEPARIN SODIUM 5000 [USP'U]/ML
INJECTION INTRAVENOUS; SUBCUTANEOUS
Qty: 25000 | Refills: 0 | Status: DISCONTINUED | OUTPATIENT
Start: 2023-04-06 | End: 2023-04-06

## 2023-04-06 RX ORDER — MORPHINE SULFATE 50 MG/1
0.5 CAPSULE, EXTENDED RELEASE ORAL EVERY 6 HOURS
Refills: 0 | Status: DISCONTINUED | OUTPATIENT
Start: 2023-04-06 | End: 2023-04-06

## 2023-04-06 RX ORDER — HEPARIN SODIUM 5000 [USP'U]/ML
10000 INJECTION INTRAVENOUS; SUBCUTANEOUS ONCE
Refills: 0 | Status: COMPLETED | OUTPATIENT
Start: 2023-04-06 | End: 2023-04-06

## 2023-04-06 RX ORDER — HEPARIN SODIUM 5000 [USP'U]/ML
5000 INJECTION INTRAVENOUS; SUBCUTANEOUS EVERY 6 HOURS
Refills: 0 | Status: DISCONTINUED | OUTPATIENT
Start: 2023-04-06 | End: 2023-04-12

## 2023-04-06 RX ORDER — BUMETANIDE 0.25 MG/ML
2 INJECTION INTRAMUSCULAR; INTRAVENOUS ONCE
Refills: 0 | Status: COMPLETED | OUTPATIENT
Start: 2023-04-06 | End: 2023-04-06

## 2023-04-06 RX ORDER — HEPARIN SODIUM 5000 [USP'U]/ML
2000 INJECTION INTRAVENOUS; SUBCUTANEOUS
Qty: 25000 | Refills: 0 | Status: DISCONTINUED | OUTPATIENT
Start: 2023-04-06 | End: 2023-04-12

## 2023-04-06 RX ORDER — ACETAZOLAMIDE 250 MG/1
500 TABLET ORAL
Refills: 0 | Status: DISCONTINUED | OUTPATIENT
Start: 2023-04-06 | End: 2023-04-06

## 2023-04-06 RX ORDER — MORPHINE SULFATE 50 MG/1
2 CAPSULE, EXTENDED RELEASE ORAL ONCE
Refills: 0 | Status: DISCONTINUED | OUTPATIENT
Start: 2023-04-06 | End: 2023-04-06

## 2023-04-06 RX ADMIN — HEPARIN SODIUM 0 UNIT(S)/HR: 5000 INJECTION INTRAVENOUS; SUBCUTANEOUS at 20:30

## 2023-04-06 RX ADMIN — Medication 125 MILLIGRAM(S): at 15:02

## 2023-04-06 RX ADMIN — Medication 3 MILLILITER(S): at 14:20

## 2023-04-06 RX ADMIN — MORPHINE SULFATE 0.5 MILLIGRAM(S): 50 CAPSULE, EXTENDED RELEASE ORAL at 21:00

## 2023-04-06 RX ADMIN — Medication 3 MILLILITER(S): at 22:53

## 2023-04-06 RX ADMIN — Medication 3 MILLILITER(S): at 16:20

## 2023-04-06 RX ADMIN — HEPARIN SODIUM 2400 UNIT(S)/HR: 5000 INJECTION INTRAVENOUS; SUBCUTANEOUS at 12:36

## 2023-04-06 RX ADMIN — Medication 3 MILLILITER(S): at 09:40

## 2023-04-06 RX ADMIN — Medication 3 MILLILITER(S): at 12:30

## 2023-04-06 RX ADMIN — Medication 80 MILLIGRAM(S): at 18:04

## 2023-04-06 RX ADMIN — HEPARIN SODIUM 2400 UNIT(S)/HR: 5000 INJECTION INTRAVENOUS; SUBCUTANEOUS at 19:27

## 2023-04-06 RX ADMIN — HEPARIN SODIUM 2000 UNIT(S)/HR: 5000 INJECTION INTRAVENOUS; SUBCUTANEOUS at 23:13

## 2023-04-06 RX ADMIN — HEPARIN SODIUM 10000 UNIT(S): 5000 INJECTION INTRAVENOUS; SUBCUTANEOUS at 12:33

## 2023-04-06 RX ADMIN — BUMETANIDE 2 MILLIGRAM(S): 0.25 INJECTION INTRAMUSCULAR; INTRAVENOUS at 14:55

## 2023-04-06 RX ADMIN — Medication 3 MILLILITER(S): at 04:38

## 2023-04-06 RX ADMIN — Medication 0.25 MILLIGRAM(S): at 14:13

## 2023-04-06 RX ADMIN — Medication 80 MILLIGRAM(S): at 05:53

## 2023-04-06 RX ADMIN — MORPHINE SULFATE 0.5 MILLIGRAM(S): 50 CAPSULE, EXTENDED RELEASE ORAL at 20:33

## 2023-04-06 NOTE — PROGRESS NOTE ADULT - PROBLEM SELECTOR PLAN 1
Multiple recent HF related hospitalizations. Could be exacerbated by new onset aflutter/bradycardia.   Clinically hypervolemic. Normotensive.  Diuretics: Continue lasix 80mg IV BID. Add diamox 500mg BID x 4 doses.  GDMT: On losartan 25mg daily for h/o HTN?. Will eventually plan to add SGLT2i+MRA.  Would benefit from CardioMEMs to avoid recurrent hospitalizations. Multiple recent HF related hospitalizations. Could be exacerbated by new onset aflutter/bradycardia.   Clinically hypervolemic. Normotensive.  Diuretics: Continue lasix 80mg IV BID. Add diamox 500mg BID x 4 doses (if ok with pulmonary). Goal 24h balance - 1 to 2 liters. If unable to achieve will start bumex gtt.   GDMT: On losartan 25mg daily for h/o HTN?. Will eventually plan to add SGLT2i+MRA.  Would benefit from CardioMEMs to avoid recurrent hospitalizations.

## 2023-04-06 NOTE — CONSULT NOTE ADULT - CRITICAL CARE ATTENDING COMMENT
greater than 50% of time spent reviewing labs, notes, orders and radiographs, coordinating care  discussed with family at bedside, palliative, med team

## 2023-04-06 NOTE — CONSULT NOTE ADULT - SUBJECTIVE AND OBJECTIVE BOX
PULMONARY CONSULT NOTE      , [id number]    Patient is a 83y old  Male who presents with a chief complaint of Ankle swelling and hard time breathing (2023 11:58)  83 year old male with PMH HTN, T2DM, CHFpEF, COPD on 2LNC, DVT on Apixaban presented with worsening dyspnea and pedal edema. Also with low heart rate recently. He states he's been worse for past couple days although he's been sick for a couple of months and was also hospitalized in [facility] for 1 week.  Denies any dizziness or chest pain but has cough productive of white phlegm. Denies any dietary indiscretion or medications non-compliance.  84 y/o male with h/o chronic HfpEF ACC/AHA stage C, COPD on home O2 (4 LPM via NC) since 2022, former smoker (stopped 2022), Dm2, DVT on Eliquis who was referred from Dr. Booker's office due to worsening Le edema and HOLT. Upon arrival to the ED, he was found to have aflutter with variable block VR 30-40s. His BP was stable and the pt denied symptoms related to bradycardia. He reports cough with phlegm w/o fever or chills.  This is his 3rd or 4th hospitalization in the past 12 month. He has been admitted multiple times to [facility].     Labs upon admission remarkable for elevated pBNP, normal trop, thrombocytopenia and elevated cO2. He received IV lasix 80mg, glucagon and calcium while in the ED.     HISTORY OF PRESENT ILLNESS:    MEDICATIONS  (STANDING):  albuterol/ipratropium for Nebulization 3 milliLiter(s) Nebulizer every 6 hours  dextrose 5%. 1000 milliLiter(s) (50 mL/Hr) IV Continuous <Continuous>  dextrose 5%. 1000 milliLiter(s) (100 mL/Hr) IV Continuous <Continuous>  dextrose 50% Injectable 25 Gram(s) IV Push once  dextrose 50% Injectable 12.5 Gram(s) IV Push once  dextrose 50% Injectable 25 Gram(s) IV Push once  furosemide   Injectable 80 milliGRAM(s) IV Push every 12 hours  glucagon  Injectable 1 milliGRAM(s) IntraMuscular once  heparin  Infusion.  Unit(s)/Hr (24 mL/Hr) IV Continuous <Continuous>  insulin lispro (ADMELOG) corrective regimen sliding scale   SubCutaneous three times a day before meals  insulin lispro (ADMELOG) corrective regimen sliding scale   SubCutaneous at bedtime  losartan 25 milliGRAM(s) Oral daily      MEDICATIONS  (PRN):  albuterol/ipratropium for Nebulization 3 milliLiter(s) Nebulizer every 3 hours PRN Shortness of Breath and/or Wheezing  dextrose Oral Gel 15 Gram(s) Oral once PRN Blood Glucose LESS THAN 70 milliGRAM(s)/deciliter  heparin   Injectable 74890 Unit(s) IV Push every 6 hours PRN For aPTT less than 40  heparin   Injectable 5000 Unit(s) IV Push every 6 hours PRN For aPTT between 40 - 57      Allergies    No Known Allergies    Intolerances        PAST MEDICAL & SURGICAL HISTORY:  Congestive heart failure (CHF)      COPD (chronic obstructive pulmonary disease)      DM (diabetes mellitus)      HTN (hypertension)      BPH (benign prostatic hyperplasia)      S/P cholecystectomy      History of appendectomy          FAMILY HISTORY:      SOCIAL HISTORY  Smoking History:     REVIEW OF SYSTEMS:    CONSTITUTIONAL:  No fevers, chills, sweats    HEENT:  Eyes:  No diplopia or blurred vision. ENT:  No earache, sore throat or runny nose.    CARDIOVASCULAR:  No pressure, squeezing, tightness, or heaviness about the chest; no palpitations.    RESPIRATORY:  No cough, shortness of breath, PND or orthopnea. Mild SOBOE    GASTROINTESTINAL:  No abdominal pain, nausea, vomiting or diarrhea.    GENITOURINARY:  No dysuria, frequency or urgency.    NEUROLOGIC:  No paresthesias, fasciculations, seizures or weakness.    PSYCHIATRIC:  No disorder of thought or mood.    Vital Signs Last 24 Hrs  T(C): 36.8 (2023 07:58), Max: 37.3 (2023 00:00)  T(F): 98.2 (2023 07:58), Max: 99.1 (2023 00:00)  HR: 68 (2023 12:30) (33 - 92)  BP: 136/65 (2023 12:00) (95/52 - 136/68)  BP(mean): 87 (2023 12:00) (70 - 113)  RR: 25 (2023 12:00) (17 - 34)  SpO2: 99% (2023 12:30) (91% - 100%)    Parameters below as of 2023 12:30  Patient On (Oxygen Delivery Method): BiPAP/CPAP,45%        PHYSICAL EXAMINATION:    GENERAL: The patient is a well-developed, well-nourished _____in no apparent distress.     HEENT: Head is normocephalic and atraumatic. Extraocular muscles are intact. Mucous membranes are moist.     NECK: Supple.     LUNGS: Clear to auscultation without wheezing, rales, or rhonchi. Respirations unlabored    HEART: Regular rate and rhythm without murmur.    ABDOMEN: Soft, nontender, and nondistended.  No hepatosplenomegaly is noted.    EXTREMITIES: Without any cyanosis, clubbing, rash, lesions or edema.    NEUROLOGIC: Grossly intact.      LABS:                        12.9   8.24  )-----------( Clumped    ( 2023 04:36 )             42.3     04-06    140  |  94<L>  |  25.5<H>  ----------------------------<  105<H>  4.2   |  38.0<H>  |  1.33<H>    Ca    8.9      2023 04:36    TPro  7.3  /  Alb  3.4  /  TBili  0.5  /  DBili  x   /  AST  20  /  ALT  17  /  AlkPhos  119  04-06    PT/INR - ( 2023 11:30 )   PT: 18.6 sec;   INR: 1.60 ratio         PTT - ( 2023 11:30 )  PTT:37.3 sec  Urinalysis Basic - ( 2023 02:00 )    Color: Yellow / Appearance: Clear / S.020 / pH: x  Gluc: x / Ketone: Negative  / Bili: Negative / Urobili: Negative mg/dL   Blood: x / Protein: Negative / Nitrite: Negative   Leuk Esterase: Trace / RBC: 6-10 /HPF / WBC 0-2 /HPF   Sq Epi: x / Non Sq Epi: x / Bacteria: x      ABG - ( 2023 12:20 )  pH, Arterial: 7.320 pH, Blood: x     /  pCO2: 94    /  pO2: 93    / HCO3: 48    / Base Excess: 22.3  /  SaO2: 99.1              CARDIAC MARKERS ( 2023 01:06 )  x     / 0.02 ng/mL / x     / x     / x                    MICROBIOLOGY:    RADIOLOGY & ADDITIONAL STUDIES:  CXR reviewed PULMONARY CONSULT NOTE      , LAINENICOLAS-[id number]    Patient is a 83y old  Male who presents with a chief complaint of Ankle swelling and hard time breathing (2023 11:58)  83 year old male with PMH HTN, T2DM, CHFpEF, COPD on 2LNC, DVT on Apixaban presented with worsening dyspnea and pedal edema. Also with low heart rate recently. He states he's been worse for past couple days although he's been sick for a couple of months and was also hospitalized in [facility] for 1 week.  Denies any dizziness or chest pain but has cough productive of white phlegm. Denies any dietary indiscretion or medications non-compliance.  82 y/o male with h/o chronic HfpEF ACC/AHA stage C, COPD on home O2 (4 LPM via NC) since 2022, former smoker (stopped 2022), Dm2, DVT on Eliquis who was referred from Dr. Booker's office due to worsening Le edema and HOLT. Upon arrival to the ED, he was found to have aflutter with variable block VR 30-40s. His BP was stable and the pt denied symptoms related to bradycardia. He reports cough with phlegm w/o fever or chills.  This is his 3rd or 4th hospitalization in the past 12 month. He has been admitted multiple times to [facility].     Labs upon admission remarkable for elevated pBNP, normal trop, thrombocytopenia and elevated cO2. He received IV lasix 80mg, glucagon and calcium while in the ED.     HISTORY OF PRESENT ILLNESS:  feels much better on NIV  now alert  family at bedside  baseline poor, room to room  has machine at home ( ? bipap) but doesnt use  uses 02 ATC alone  per prior notes [facility]  CTA negative PE   echo  mild Rv dilation  Trelelgy at home, pfts restrictive- but managed as COPD by PMD pul  MEDICATIONS  (STANDING):  albuterol/ipratropium for Nebulization 3 milliLiter(s) Nebulizer every 6 hours  dextrose 5%. 1000 milliLiter(s) (50 mL/Hr) IV Continuous <Continuous>  dextrose 5%. 1000 milliLiter(s) (100 mL/Hr) IV Continuous <Continuous>  dextrose 50% Injectable 25 Gram(s) IV Push once  dextrose 50% Injectable 12.5 Gram(s) IV Push once  dextrose 50% Injectable 25 Gram(s) IV Push once  furosemide   Injectable 80 milliGRAM(s) IV Push every 12 hours  glucagon  Injectable 1 milliGRAM(s) IntraMuscular once  heparin  Infusion.  Unit(s)/Hr (24 mL/Hr) IV Continuous <Continuous>  insulin lispro (ADMELOG) corrective regimen sliding scale   SubCutaneous three times a day before meals  insulin lispro (ADMELOG) corrective regimen sliding scale   SubCutaneous at bedtime  losartan 25 milliGRAM(s) Oral daily      MEDICATIONS  (PRN):  albuterol/ipratropium for Nebulization 3 milliLiter(s) Nebulizer every 3 hours PRN Shortness of Breath and/or Wheezing  dextrose Oral Gel 15 Gram(s) Oral once PRN Blood Glucose LESS THAN 70 milliGRAM(s)/deciliter  heparin   Injectable 30095 Unit(s) IV Push every 6 hours PRN For aPTT less than 40  heparin   Injectable 5000 Unit(s) IV Push every 6 hours PRN For aPTT between 40 - 57      Allergies    No Known Allergies    Intolerances        PAST MEDICAL & SURGICAL HISTORY:  Congestive heart failure (CHF)      COPD (chronic obstructive pulmonary disease)      DM (diabetes mellitus)      HTN (hypertension)      BPH (benign prostatic hyperplasia)      S/P cholecystectomy      History of appendectomy          FAMILY HISTORY:      SOCIAL HISTORY  Smoking History:     REVIEW OF SYSTEMS:    CONSTITUTIONAL:  No fevers, chills, sweats    HEENT:  Eyes:  No diplopia or blurred vision. ENT:  No earache, sore throat or runny nose.    CARDIOVASCULAR:  No pressure, squeezing, tightness, or heaviness about the chest; no palpitations.    RESPIRATORY:  see HPI    GASTROINTESTINAL:  No abdominal pain, nausea, vomiting or diarrhea.    GENITOURINARY:  No dysuria, frequency or urgency.    NEUROLOGIC:  No paresthesias, fasciculations, seizures or weakness.    PSYCHIATRIC:  No disorder of thought or mood.    Vital Signs Last 24 Hrs  T(C): 36.8 (2023 07:58), Max: 37.3 (2023 00:00)  T(F): 98.2 (2023 07:58), Max: 99.1 (2023 00:00)  HR: 68 (2023 12:30) (33 - 92)  BP: 136/65 (2023 12:00) (95/52 - 136/68)  BP(mean): 87 (2023 12:00) (70 - 113)  RR: 25 (2023 12:00) (17 - 34)  SpO2: 99% (2023 12:30) (91% - 100%)    Parameters below as of 2023 12:30  Patient On (Oxygen Delivery Method): BiPAP/CPAP,45%        PHYSICAL EXAMINATION:    GENERAL: The patient is a well-developed, well-nourished__in no apparent distress.     HEENT: Head is normocephalic and atraumatic. Extraocular muscles are intact. Mucous membranes are moist.     NECK: Supple.     LUNGS: moderate air entry bilat  without wheezing, rales, or rhonchi. Respirations unlabored    HEART: Regular rate and rhythm without murmur.    ABDOMEN: Soft, nontender, and nondistended.  No hepatosplenomegaly is noted.    EXTREMITIES: With 1-2  edema.    NEUROLOGIC: Grossly intact.      LABS:                        12.9   8.24  )-----------( Clumped    ( 2023 04:36 )             42.3     04-06    140  |  94<L>  |  25.5<H>  ----------------------------<  105<H>  4.2   |  38.0<H>  |  1.33<H>    Ca    8.9      2023 04:36    TPro  7.3  /  Alb  3.4  /  TBili  0.5  /  DBili  x   /  AST  20  /  ALT  17  /  AlkPhos  119  04-06    PT/INR - ( 2023 11:30 )   PT: 18.6 sec;   INR: 1.60 ratio         PTT - ( 2023 11:30 )  PTT:37.3 sec  Urinalysis Basic - ( 2023 02:00 )    Color: Yellow / Appearance: Clear / S.020 / pH: x  Gluc: x / Ketone: Negative  / Bili: Negative / Urobili: Negative mg/dL   Blood: x / Protein: Negative / Nitrite: Negative   Leuk Esterase: Trace / RBC: 6-10 /HPF / WBC 0-2 /HPF   Sq Epi: x / Non Sq Epi: x / Bacteria: x      ABG - ( 2023 12:20 )  pH, Arterial: 7.320 pH, Blood: x     /  pCO2: 94    /  pO2: 93    / HCO3: 48    / Base Excess: 22.3  /  SaO2: 99.1              CARDIAC MARKERS ( 2023 01:06 )  x     / 0.02 ng/mL / x     / x     / x                    MICROBIOLOGY:    RADIOLOGY & ADDITIONAL STUDIES:  CXRs reviewed  office and hospital notes [facility] reviewed

## 2023-04-06 NOTE — PROGRESS NOTE ADULT - PROBLEM SELECTOR PLAN 2
New onset w/ variable block which appears to be improving off AVN blocker  Recommend rhythm control if possible  AC: Heparin gtt New onset w/ variable block which appears to be improving off AVN blocker  Recommend rhythm control if possible  AC: Heparin gtt  Appreciate EP recs -?PPM for tachy-renetta

## 2023-04-06 NOTE — CHART NOTE - NSCHARTNOTEFT_GEN_A_CORE
Called by RN out of concern for increased lethargy   Pt seen and evaluated at bedside. Pt appears lethargic but is able to state name, , time and place with increased verbal stimuli. Pt denies any chest pain, SOB, palpitations, headaches, nausea or any other complaints. ABG yesterday with CO2 89 and pH 7.32    Vital Signs   T(F): 98.9 (2023 04:33), Max: 99.1 (2023 00:00)  HR: 72 (2023 06:00) (33 - 92)  BP: 118/90 (2023 06:00) (95/52 - 136/68)  RR: 20 (2023 06:00) (17 - 34)  SpO2: 94% on 4L NC (2023 06:00) (91% - 98%)    PHYSICAL EXAM:  GENERAL: Pt lying in bed comfortably in NAD  HEENT PERRL b/l, conjunctiva and sclera clear  CHEST/LUNG: Unlabored respirations. Coarse breath sounds bilaterally; +Crackles  HEART: S1, S2, Regular rate and rhythm   NERVOUS SYSTEM:  Alert & Oriented X3, speech clear. Answers questions appropriately.  No deficits     POCT Blood Glucose.: 110 mg/dL (23 @ 06:07)    Repeat ABG and CXR ordered, may need BiPAP  Signed out to day PA for follow up

## 2023-04-06 NOTE — CONSULT NOTE ADULT - TIME BILLING
D/W pt, hospitalist Dr Tao, LEANNA Wilde, RN    Total time also includes discussion during interdisciplinary team rounds, chart review, review of medications/ labs/ imaging, examination, care coordination with other health care professionals, documentation.
- Review of records, telemetry, vital signs and daily labs.   - General and cardiovascular physical examination.  - Generation of cardiovascular treatment plan.  - Coordination of care with primary team.

## 2023-04-06 NOTE — PROGRESS NOTE ADULT - SUBJECTIVE AND OBJECTIVE BOX
[facility] Advanced Heart Failure-Progress Note  [address] [place] [address] Office Phone: [phone]/Fax: [phone]  Service/On Call Phone [phone]    Subjective/Objective: Patient with persistent hypercapnia requiring CPAP this am. MICU consulted but was not accepted at this time. Repeat ABG pending.     Medications:  acetaZOLAMIDE  IVPB 500 milliGRAM(s) IV Intermittent two times a day  albuterol/ipratropium for Nebulization 3 milliLiter(s) Nebulizer every 6 hours  albuterol/ipratropium for Nebulization 3 milliLiter(s) Nebulizer every 3 hours PRN  dextrose 5%. 1000 milliLiter(s) IV Continuous <Continuous>  dextrose 5%. 1000 milliLiter(s) IV Continuous <Continuous>  dextrose 50% Injectable 25 Gram(s) IV Push once  dextrose 50% Injectable 12.5 Gram(s) IV Push once  dextrose 50% Injectable 25 Gram(s) IV Push once  dextrose Oral Gel 15 Gram(s) Oral once PRN  furosemide   Injectable 80 milliGRAM(s) IV Push every 12 hours  glucagon  Injectable 1 milliGRAM(s) IntraMuscular once  heparin   Injectable 92567 Unit(s) IV Push every 6 hours PRN  heparin   Injectable 5000 Unit(s) IV Push every 6 hours PRN  heparin  Infusion.  Unit(s)/Hr IV Continuous <Continuous>  insulin lispro (ADMELOG) corrective regimen sliding scale   SubCutaneous three times a day before meals  insulin lispro (ADMELOG) corrective regimen sliding scale   SubCutaneous at bedtime  losartan 25 milliGRAM(s) Oral daily    Vitals:  T(C): 36.8 (04-06-23 @ 07:58), Max: 37.3 (04-06-23 @ 00:00)  HR: 70 (04-06-23 @ 12:45) (33 - 92)  BP: 136/65 (04-06-23 @ 12:00) (95/52 - 136/68)  RR: 25 (04-06-23 @ 12:00) (17 - 34)  SpO2: 99% (04-06-23 @ 12:45) (91% - 100%)    I&O's Summary  05 Apr 2023 07:01  -  06 Apr 2023 07:00  --------------------------------------------------------  IN: 240 mL / OUT: 550 mL / NET: -310 mL    06 Apr 2023 07:01  -  06 Apr 2023 13:06  --------------------------------------------------------  IN: 0 mL / OUT: 1350 mL / NET: -1350 mL    Tele: Aflutter HR 70's    Physical Exam:  General: On CPAP in no acute distress.  Neck: Neck supple. JVP significantly elevated.   Chest: Clear to auscultation bilaterally  CV: Normal S1 and S2. No murmurs, rub, or gallops. Warm peripherally.  PV: 1-2+ B/L LE edema   Abdomen: Soft, obese  Skin: warm, dry  Neurology: Alert and oriented times three. Sensation intact.  Psych: Affect appropriate.    Labs:                        12.9   8.24  )-----------( Clumped    ( 06 Apr 2023 04:36 )             42.3     04-06    140  |  94<L>  |  25.5<H>  ----------------------------<  105<H>  4.2   |  38.0<H>  |  1.33<H>    Ca    8.9      06 Apr 2023 04:36    TPro  7.3  /  Alb  3.4  /  TBili  0.5  /  DBili  x   /  AST  20  /  ALT  17  /  AlkPhos  119  04-06    PT/INR - ( 06 Apr 2023 11:30 )   PT: 18.6 sec;   INR: 1.60 ratio         PTT - ( 06 Apr 2023 11:30 )  PTT:37.3 sec  CARDIAC MARKERS ( 05 Apr 2023 01:06 )  x     / 0.02 ng/mL / x     / x     / x          TTE Echo Complete w/ Contrast w/ Doppler (04.05.23 @ 22:30)   Summary:   1. Technically difficult study.   2. Endocardial visualization was enhanced with intravenous echo contrast.   3. Normal global left ventricular systolic function.   4. Left ventricular ejection fraction, by visual estimation, is 55 to   60%.   5. The mitral in-flow pattern reveals no discernable A-wave, therefore   no comment on diastolic function can be made.   6. Normal left atrial size.   7. Severely enlarged right atrium.   8. Severely enlarged right ventricle.   9. Dilatation of the aortic root.  10. Sclerotic aortic valve with normal opening.  11. Mild thickening of the anterior and posterior mitral valve leaflets.  12. Trace mitral valve regurgitation.  13. Moderate tricuspid regurgitation.  14. Estimated pulmonary artery systolic pressure is 61.8 mmHg assuming a   right atrialpressure of 15 mmHg, which is consistent with severe   pulmonary hypertension.  15. There is no evidence of pericardial effusion.    < end of copied text >

## 2023-04-06 NOTE — CONSULT NOTE ADULT - ASSESSMENT
Acute on chronic hypoxemic, hypercapnic resp failure  COPD, OHS /CHALO /HFpEF   Severe PH with Cor pulmonale  Better on Bipap, but remains sig hypercapnic, changed to AVAPs- repeat ABG  Eventual wean to nocturnal only- will need to verify from home care company type of machine he has- family aware  Continue nebs, add medrol  gentle diuresis  D/C Diamox will worsen acidemia and decrease HCO3 which is needed to compensate for hypercapnia  on full AC  02  Critical, prognosis guarded Acute on chronic hypoxemic, hypercapnic resp failure  COPD, OHS /CHALO /HFpEF   Severe PH with Cor pulmonale  Better on Bipap, but remains sig hypercapnic, changed to AVAPs- repeat ABG  Eventual wean to nocturnal only- will need to verify from home care company type of machine he has- family aware  Continue nebs, add medrol  gentle diuresis  D/C Diamox will worsen acidemia and decrease HCO3 which is needed to compensate for hypercapnia  on full AC, has hematuria- , repeat UA  02  Critical, prognosis guarded

## 2023-04-06 NOTE — CHART NOTE - NSCHARTNOTEFT_GEN_A_CORE
Patient seen and evaluated by Dr Wood and patient was switched to AVAPS. Patient did not tolerate the change. RR increased to 40s, patient c/o can't breath, very anxious. Patient switched back to BiPAP with modest improvement but continued to hyperventilate and very anxious. Patient given ativan 0.25mg Ativan for anxiety.   Patient seen and examined w ICU attending (David). Recommended against Diamox ordered by cardiology due to current pH. Instead Bumex 2mg IVPx1 ordered. Morphine was recommended as well but after Ativan patient sleeping comfortably and resp distress/anxiety resolved so morphine d/c for now. Pt Wheezy and diminished on exam, given PRN neb with improvement in aeration. Dr Hernandez also recommended corticosteroids so placed on solumedrol [id number] and placed on 40mg IV Q8h. Dr Tao made aware of changes.     Additionally spend about 5 minutes answering daughters questions about advanced directives.

## 2023-04-06 NOTE — CONSULT NOTE ADULT - SUBJECTIVE AND OBJECTIVE BOX
Patient is a 83y old  Male who presents with a chief complaint of Ankle swelling and hard time breathing (2023 09:39)      BRIEF HOSPITAL COURSE: Pt is an 84 y/o M pmhx of HTN, DM2, HFpEF, COPD on 2L home OC, DVT on apixaban who presented to [facility] w/ worsening SOB/HOLT w/ associated pedal edema. Pt admitted to tele floor for acute on chronic HFpEF exacerbation and started on 80 lasix IVP Q12hr. Hospital course complicated by worsening hypercarbic respiratory acidosis, placed on BiPAP w/o improvement. ICU consulted for further management.       PAST MEDICAL & SURGICAL HISTORY:  Congestive heart failure (CHF)      COPD (chronic obstructive pulmonary disease)      DM (diabetes mellitus)      HTN (hypertension)      BPH (benign prostatic hyperplasia)      S/P cholecystectomy      History of appendectomy        Allergies    No Known Allergies    Intolerances      FAMILY HISTORY:      Review of Systems:  Unable to assess secondary to BiPAP, respiratory status       Medications:    furosemide   Injectable 80 milliGRAM(s) IV Push every 12 hours  losartan 25 milliGRAM(s) Oral daily    albuterol/ipratropium for Nebulization 3 milliLiter(s) Nebulizer every 6 hours  albuterol/ipratropium for Nebulization 3 milliLiter(s) Nebulizer every 3 hours PRN        heparin   Injectable 77731 Unit(s) IV Push once  heparin   Injectable 09659 Unit(s) IV Push every 6 hours PRN  heparin   Injectable 5000 Unit(s) IV Push every 6 hours PRN  heparin  Infusion.  Unit(s)/Hr IV Continuous <Continuous>        dextrose 50% Injectable 25 Gram(s) IV Push once  dextrose 50% Injectable 12.5 Gram(s) IV Push once  dextrose 50% Injectable 25 Gram(s) IV Push once  dextrose Oral Gel 15 Gram(s) Oral once PRN  glucagon  Injectable 1 milliGRAM(s) IntraMuscular once  insulin lispro (ADMELOG) corrective regimen sliding scale   SubCutaneous three times a day before meals  insulin lispro (ADMELOG) corrective regimen sliding scale   SubCutaneous at bedtime    dextrose 5%. 1000 milliLiter(s) IV Continuous <Continuous>  dextrose 5%. 1000 milliLiter(s) IV Continuous <Continuous>                ICU Vital Signs Last 24 Hrs  T(C): 36.8 (2023 07:58), Max: 37.3 (2023 00:00)  T(F): 98.2 (2023 07:58), Max: 99.1 (2023 00:00)  HR: 70 (2023 10:00) (33 - 92)  BP: 111/90 (2023 10:00) (95/52 - 136/68)  BP(mean): 98 (2023 10:00) (70 - 113)  ABP: --  ABP(mean): --  RR: 26 (2023 10:00) (17 - 34)  SpO2: 98% (2023 10:00) (91% - 99%)    O2 Parameters below as of 2023 10:00  Patient On (Oxygen Delivery Method): BiPAP/CPAP          Vital Signs Last 24 Hrs  T(C): 36.8 (2023 07:58), Max: 37.3 (2023 00:00)  T(F): 98.2 (2023 07:58), Max: 99.1 (2023 00:00)  HR: 70 (2023 10:00) (33 - 92)  BP: 111/90 (2023 10:00) (95/52 - 136/68)  BP(mean): 98 (2023 10:00) (70 - 113)  RR: 26 (2023 10:00) (17 - 34)  SpO2: 98% (2023 10:00) (91% - 99%)    Parameters below as of 2023 10:00  Patient On (Oxygen Delivery Method): BiPAP/CPAP        ABG - ( 2023 10:20 )  pH, Arterial: 7.290 pH, Blood: x     /  pCO2: 99    /  pO2: 74    / HCO3: 48    / Base Excess: 21.0  /  SaO2: 96.5                I&O's Detail    2023 07:01  -  2023 07:00  --------------------------------------------------------  IN:    Oral Fluid: 240 mL  Total IN: 240 mL    OUT:    Voided (mL): 550 mL  Total OUT: 550 mL    Total NET: -310 mL      2023 07:01  -  2023 11:19  --------------------------------------------------------  IN:  Total IN: 0 mL    OUT:    Voided (mL): 900 mL  Total OUT: 900 mL    Total NET: -900 mL            LABS:                        12.9   8.24  )-----------( Clumped    ( 2023 04:36 )             42.3     04-06    140  |  94<L>  |  25.5<H>  ----------------------------<  105<H>  4.2   |  38.0<H>  |  1.33<H>    Ca    8.9      2023 04:36    TPro  7.3  /  Alb  3.4  /  TBili  0.5  /  DBili  x   /  AST  20  /  ALT  17  /  AlkPhos  119  04-06      CARDIAC MARKERS ( 2023 01:06 )  x     / 0.02 ng/mL / x     / x     / x          CAPILLARY BLOOD GLUCOSE      POCT Blood Glucose.: 110 mg/dL (2023 06:07)    PT/INR - ( 2023 01:06 )   PT: 21.3 sec;   INR: 1.83 ratio         PTT - ( 2023 01:06 )  PTT:39.3 sec  Urinalysis Basic - ( 2023 02:00 )    Color: Yellow / Appearance: Clear / S.020 / pH: x  Gluc: x / Ketone: Negative  / Bili: Negative / Urobili: Negative mg/dL   Blood: x / Protein: Negative / Nitrite: Negative   Leuk Esterase: Trace / RBC: 6-10 /HPF / WBC 0-2 /HPF   Sq Epi: x / Non Sq Epi: x / Bacteria: x      CULTURES:      Physical Examination:    General: Pt laying in hospital bed in no acute distress. A/O X2-3, interactive, follows commands.     HEENT: Pupils equal, reactive to light.  Symmetric.    PULM: Clear to auscultation bilaterally, no significant sputum production    CVS: Regular rate and rhythm, no murmurs, rubs, or gallops    ABD: Soft, nondistended, nontender, normoactive bowel sounds, no masses    EXT: No edema, nontender    SKIN: Warm and well perfused.     RADIOLOGY:   < from: Xray Chest 1 View- PORTABLE-Urgent (23 @ 12:36) >  ACC: [id number] EXAM:  XR CHEST PORTABLE URGENT 1V   ORDERED BY: SONIA KHAN DATE:  2023          INTERPRETATION:  TECHNIQUE: Single portable view of the chest.    COMPARISON:  None    CLINICAL HISTORY: Chest Pain    FINDINGS:    Single frontal view of the chest demonstrates mild congestive changes.   Mild bibasilar atelectasis. The cardiomediastinal silhouette is enlarged.   No acute osseous abnormalities.    IMPRESSION: Mild congestive changes. Mild bibasilar atelectasis.   Cardiomegaly    --- End of Report ---            PEDRO ISABEL MD; Attending Radiologist  This document has been electronically signed. 2023 12:42PM

## 2023-04-06 NOTE — CONSULT NOTE ADULT - SUBJECTIVE AND OBJECTIVE BOX
Patient is a 83y old  Male who presents with a chief complaint of Ankle swelling and hard time breathing (2023 17:37)      HPI:  83 year old male with PMH HTN, T2DM, CHFpEF, COPD on 2LNC, DVT on Apixaban presented with worsening dyspnea and pedal edema. Also with low heart rate recently. He states he's been worse for past couple days although he's been sick for a couple of months and was also hospitalized in [facility] for 1 week.  Denies any dizziness or chest pain but has cough productive of white phlegm. Denies any dietary indiscretion or medications non-compliance.   (2023 15:12)      PAST MEDICAL & SURGICAL HISTORY:  Congestive heart failure (CHF)      COPD (chronic obstructive pulmonary disease)      DM (diabetes mellitus)      HTN (hypertension)      BPH (benign prostatic hyperplasia)      S/P cholecystectomy      History of appendectomy          Allergies    No Known Allergies    Intolerances        MEDICATIONS  (STANDING):  albuterol/ipratropium for Nebulization 3 milliLiter(s) Nebulizer every 6 hours  apixaban 5 milliGRAM(s) Oral two times a day  dextrose 5%. 1000 milliLiter(s) (50 mL/Hr) IV Continuous <Continuous>  dextrose 5%. 1000 milliLiter(s) (100 mL/Hr) IV Continuous <Continuous>  dextrose 50% Injectable 25 Gram(s) IV Push once  dextrose 50% Injectable 12.5 Gram(s) IV Push once  dextrose 50% Injectable 25 Gram(s) IV Push once  furosemide   Injectable 80 milliGRAM(s) IV Push every 12 hours  glucagon  Injectable 1 milliGRAM(s) IntraMuscular once  insulin lispro (ADMELOG) corrective regimen sliding scale   SubCutaneous three times a day before meals  insulin lispro (ADMELOG) corrective regimen sliding scale   SubCutaneous at bedtime  losartan 25 milliGRAM(s) Oral daily  metolazone 5 milliGRAM(s) Oral daily  tamsulosin 0.4 milliGRAM(s) Oral at bedtime    MEDICATIONS  (PRN):  albuterol/ipratropium for Nebulization 3 milliLiter(s) Nebulizer every 3 hours PRN Shortness of Breath and/or Wheezing  dextrose Oral Gel 15 Gram(s) Oral once PRN Blood Glucose LESS THAN 70 milliGRAM(s)/deciliter      FAMILY HISTORY: noncontributory      SOCIAL HISTORY:    CIGARETTES: denies    ALCOHOL: denies    REVIEW OF SYSTEMS:  Unable to obtain due to decreased mental ststus    Vital Signs Last 24 Hrs  T(C): 36.8 (2023 07:58), Max: 37.3 (2023 00:00)  T(F): 98.2 (2023 07:58), Max: 99.1 (2023 00:00)  HR: 70 (2023 08:00) (33 - 92)  BP: 126/92 (2023 08:00) (95/52 - 136/68)  BP(mean): 96 (2023 08:00) (70 - 113)  RR: 26 (2023 08:00) (17 - 34)  SpO2: 97% (2023 08:00) (91% - 98%)    Parameters below as of 2023 08:00  Patient On (Oxygen Delivery Method): nasal cannula  O2 Flow (L/min): 3      Daily     Daily     I&O's Detail    2023 07:01  -  2023 07:00  --------------------------------------------------------  IN:    Oral Fluid: 240 mL  Total IN: 240 mL    OUT:    Voided (mL): 550 mL  Total OUT: 550 mL    Total NET: -310 mL      2023 07:01  -  2023 09:40  --------------------------------------------------------  IN:  Total IN: 0 mL    OUT:    Voided (mL): 900 mL  Total OUT: 900 mL    Total NET: -900 mL          PHYSICAL EXAM:  Appearance: obese, no acute distress  HEENT:   Normal oral mucosa, PERRL, EOMI, sclera non-icteric	  Lymphatic: No cervical lymphadenopathy  Cardiovascular: Normal S1 S2, No JVD, No cardiac murmurs, No carotid bruits, 2+ edema  Respiratory: diminished breath sounds	  Gastrointestinal:  Soft, Non-tender, + BS, no bruits	  Skin: No rashes, No ecchymoses, No cyanosis  Neurologic: decreased mental status  Extremities: Normal range of motion, No clubbing, cyanosis or 2+ edema  Vascular: Peripheral pulses palpable 2+ bilaterally      INTERPRETATION OF TELEMETRY: atrial fibrillation and flutter with episodes of bradycardia    ECG: Atrial flutter, HR 30's    LABS:                        12.9   8.24  )-----------( Clumped    ( 2023 04:36 )             42.3     04-06    140  |  94<L>  |  25.5<H>  ----------------------------<  105<H>  4.2   |  38.0<H>  |  1.33<H>    Ca    8.9      2023 04:36    TPro  7.3  /  Alb  3.4  /  TBili  0.5  /  DBili  x   /  AST  20  /  ALT  17  /  AlkPhos  119  04-06    CARDIAC MARKERS ( 2023 01:06 )  x     / 0.02 ng/mL / x     / x     / x          PT/INR - ( 2023 01:06 )   PT: 21.3 sec;   INR: 1.83 ratio         PTT - ( 2023 01:06 )  PTT:39.3 sec  Urinalysis Basic - ( 2023 02:00 )    Color: Yellow / Appearance: Clear / S.020 / pH: x  Gluc: x / Ketone: Negative  / Bili: Negative / Urobili: Negative mg/dL   Blood: x / Protein: Negative / Nitrite: Negative   Leuk Esterase: Trace / RBC: 6-10 /HPF / WBC 0-2 /HPF   Sq Epi: x / Non Sq Epi: x / Bacteria: x      I&O's Summary    2023 07:01  -  2023 07:00  --------------------------------------------------------  IN: 240 mL / OUT: 550 mL / NET: -310 mL    2023 07:01  -  2023 09:40  --------------------------------------------------------  IN: 0 mL / OUT: 900 mL / NET: -900 mL      BNP  RADIOLOGY & ADDITIONAL STUDIES:

## 2023-04-06 NOTE — PROGRESS NOTE ADULT - ASSESSMENT
Primary Cardiology: Dr. Booker ([facility])  HF Cardiologist: Dr. Carreon    84 y/o male with h/o chronic HFpEF ACC/AHA stage C, COPD on home O2 (4 LPM via NC) since July 2022, former smoker (stopped 7/2022), DM2, DVT on Eliquis who was referred from Dr. Booker's office due to ADHF. Upon arrival to the ED he was found to have newly diagnosed Aflutter with variable block (VR 30-40s). His BP was stable and he denied symptoms associated to bradycardia. His labs were remarkable for elevated pBNP, normal trop, thrombocytopenia and elevated CO2. His CxR showed pulmonary congestion. He was given IV lasix, glucagon and calcium. His AV node blocker agents were held.   This is his 3rd or 4th hospitalization in the past 12 month. He has been admitted multiple times to [facility].     4/6: HR has improved. Requiring CPAP for persistent hypercapnia. Good UOP response to lasix however needs continued aggressive diuresis.     Cardiac Studies:  TTE 4/5/23: LVEF 55-60%, LVIDd 5.47cm, severe RA and RV enlargement, trace MR, moderate TR, PASP 61.8 mmHg (RA 15 mmHg).

## 2023-04-06 NOTE — CONSULT NOTE ADULT - NS PANP COMMENT GEN_ALL_CORE FT
83-year-old  male with past medical history of obesity essential hypertension diabetes heart failure with preserved EF COPD with chronic hypoxic respiratory failure DVT on anticoagulation moderate to severe pulmonary hypertension with history of tobacco use disorder presented to [facility] on April 5 with worsening dyspnea on exertion with pedal edema admitted to medical floor with acute on chronic heart failure exacerbation started on IV diuresis with hospital course complicated by worsening acute on chronic hypercapnic respiratory failure with periods of delirium patient was placed on noninvasive ventilator with BiPAP medical ICU consulted.  I saw the patient multiple times through today and recommended  As needed morphine/Ativan for noninvasive ventilator compliance   BiPAP 16/6 at 40% with backup rate of 16 nighttime 0.8 seconds patient having minute ventilation upwards of 11 L with improvement in ABG as  7.3 7/88/76/51/51/97%;  recommend continuing with current settings through rest of the evening and overnight with ABG in the morning liberation to high flow nasal cannula   recommended additional dose of Bumex today 1 patient was tachypneic earlier while continuing with twice daily IV Lasix as you are doing  Noted input from cardiology about requirement of pacemaker for tachybradycardia syndrome   recommend addition of IV steroids along with inhaled bronchodilators   unfortunately with patient's age and multiple past medical history, I spent a long time with patient's daughter Alma at bedside along with her  and talked about multiple advance directive options when she told me that if it is up to her knowing her father she would want him to be DNR/DNI but patient and his distressed condition  compounded by hypercapnic respiratory failure he wanted all measures taken and hence he remains full code for now but daughter is planning to talk to her other family members while following clinical course.    Plan discussed with family, ICU team, respiratory therapist, hospitalist as well as the bedside PA 83-year-old  male with past medical history of obesity essential hypertension diabetes heart failure with preserved EF COPD with chronic hypoxic respiratory failure DVT on anticoagulation moderate to severe pulmonary hypertension with history of tobacco use disorder presented to [facility] on April 5 with worsening dyspnea on exertion with pedal edema admitted to medical floor with acute on chronic heart failure exacerbation started on IV diuresis with hospital course complicated by worsening acute on chronic hypercapnic respiratory failure with periods of delirium patient was placed on noninvasive ventilator with BiPAP medical ICU consulted.  I saw the patient multiple times through today and recommended  As needed morphine/Ativan for noninvasive ventilator compliance   BiPAP 16/6 at 40% with backup rate of 16 nighttime 0.8 seconds patient having minute ventilation upwards of 11 L with improvement in ABG as  7.3 7/88/76/51/51/97%;  recommend continuing with current settings through rest of the evening and overnight with ABG in the morning liberation to high flow nasal cannula   recommended additional dose of Bumex today 1 patient was tachypneic earlier while continuing with twice daily IV Lasix as you are doing  Noted input from cardiology about requirement of pacemaker for tachybradycardia syndrome   recommend addition of IV steroids along with inhaled bronchodilators   unfortunately with patient's age and multiple past medical history, I spent a long time with patient's daughter Alma at bedside along with her  and talked about multiple advance directive options when she told me that if it is up to her knowing her father she would want him to be DNR/DNI but patient and his distressed condition  compounded by hypercapnic respiratory failure he wanted all measures taken and hence he remains full code for now but daughter is planning to talk to her other family members while following clinical course.    Plan discussed with family, ICU team, respiratory therapist, hospitalist as well as the bedside PA      Thank you for your consult.   Please call us back with any worsening clinical status or with concerns & questions.   We will Sign Off for now.     Kamlesh Hernandez MD   Division of Critical Care Medicine  Department of Medicine   [facility]   Cell [phone]

## 2023-04-06 NOTE — CHART NOTE - NSCHARTNOTEFT_GEN_A_CORE
ABG - ( 06 Apr 2023 10:20 )  pH, Arterial: 7.290 pH, Blood: x     /  pCO2: 99    /  pO2: 74    / HCO3: 48    / Base Excess: 21.0  /  SaO2: 96.5      AVAPS 14/6 PSV 12, 45%, Vt 300-400 ABG - ( 06 Apr 2023 10:20 )  pH, Arterial: 7.290 pH, Blood: x     /  pCO2: 99    /  pO2: 74    / HCO3: 48    / Base Excess: 21.0  /  SaO2: 96.5      AVAPS 14/6 PSV 12, 45%, Vt 300-400    Reviewed w Dr Tao and palliative Dr Delgado.   Requested consult from ICU PA.   Pt established w [facility], consult placed    Dr Tao spoke with daughter, would like to speak to ICU team before making decision on resuscitation measures. She is undecided at this time ABG - ( 06 Apr 2023 10:20 )  pH, Arterial: 7.290 pH, Blood: x     /  pCO2: 99    /  pO2: 74    / HCO3: 48    / Base Excess: 21.0  /  SaO2: 96.5      AVAPS 14/6 PSV 12, 45%, Vt 300-400    Reviewed w Dr Tao and palliative Dr Delgado.   Requested consult from ICU PA.   Pt established w [facility], consult placed    Dr Tao spoke with daughter, daughter would like to speak to ICU team before making decision on resuscitation measures. She is undecided at this time

## 2023-04-06 NOTE — CONSULT NOTE ADULT - ASSESSMENT
- Tachy renetta with history of both atrial fibrillation and flutter - needs pacemaker implant   - Respiratory failure with CO2 retention and diminished mental status   - HFpEF - + fluid overload    Plan  Continue to hold diltiazem. Resume metoprolol if develops tachycardia  Hold Eliquis, bridge with lovenox over weekend  Stabilize regarding CO2 retention and decreased mental status - would transfer to ICU for now  Pacemaker implant by [facility] EP service Monday if stable  OK to continue with diuresis  for now but if develops metabolic alkalosis that may lead to critical CO2 retention from respiratory compensation

## 2023-04-06 NOTE — PROGRESS NOTE ADULT - ASSESSMENT
83 year old male with PMH HTN, T2DM, CHFpEF, COPD on 2LNC, DVT on Apixaban presented with worsening dyspnea and pedal edema.  EKG with Atrial flutter with slow VR and Chest X-Ray with congestion. BNP 1652    Acute on Chronic respiratory failure with hypoxia and hypercapnia   Multifactorial: Acute on chronic CHF and COPD exacerbation, CHALO/OHS, Severe PH with Cor pulmonale   ECHO 4/5/23: LVEF 55-60%, severe RA and RV enlargement   - Admit to telemetry   - I/O, Daily weight, fluid and salt restriction   - c/w Furosemide 80mg IV q12   - c/w Metolazone daily x 3 doses   - c/w Losartan   - Consider SLGT2i   - Appreciate Cardio and Heart Failure Consult   - Start IV Solu-medrol   - c/w DuoNebs   - c/w BiPAP, but given significant hypercapnia, change to AVAPs per Pulm  - Appreciate Pulm consult, repeat ABG in 2 hours   - Pall care consult     Atrial Flutter   Bradycardia   - Monitor   - Hold Metoprolol  - Hold Apixaban, switch to Heparin gtt     HTN  - Add losartan  - Metoprolol on hold due to bradycardia    T2DM  - Carbohydrate consistent diet   - Hold home meds   - BGM with ISS   - A1c 5.9     VTE prophylaxis - Heparin gtt     Incentive Spirometry, OOB    Guarded Prognosis

## 2023-04-06 NOTE — CHART NOTE - NSCHARTNOTEFT_GEN_A_CORE
ABG - ( 2023 06:43 )  pH, Arterial: 7.300 pH, Blood: x     /  pCO2: 97    /  pO2: 67    / HCO3: 48    / Base Excess: 21.3  /  SaO2: 93.8        Patient lethargic but arouses  PHYSICAL EXAM:    Vital Signs Last 24 Hrs  T(C): 36.8 (2023 07:58), Max: 37.3 (2023 00:00)  T(F): 98.2 (2023 07:58), Max: 99.1 (2023 00:00)  HR: 70 (2023 08:00) (33 - 92)  BP: 126/92 (2023 08:00) (95/52 - 136/68)  BP(mean): 96 (2023 08:00) (70 - 113)  RR: 26 (2023 08:00) (17 - 34)  SpO2: 97% (2023 08:00) (91% - 98%)    Parameters below as of 2023 08:00  Patient On (Oxygen Delivery Method): nasal cannula    GENERAL: sitting up in bed, tachypneic, labored resp effort,  unable to speak in full sentences,   CHEST/LUNG: Distant, very dimin bases, diffuse fine end insp crackles  HEART:   ABDOMEN: Soft, Nontender, Bowel sounds present.  MUSCULOSKELETAL: moving x4  SKIN: cool,dry  NEURO: lethargic but able to be aroused, not fully alert but oriented to name, , hospital/[place], and year    Start bipap  NPO  repeat ABG on BiPAP  Dr Tao aware

## 2023-04-06 NOTE — CONSULT NOTE ADULT - ASSESSMENT
Pt is an 84 y/o M pmhx of HTN, DM2, HFpEF, COPD on 2L home OC, DVT on apixaban who presented to [facility] w/ worsening SOB/HOLT w/ associated pedal edema. Pt admitted to tele floor for acute on chronic HFpEF exacerbation and started on 80 lasix IVP Q12hr. Hospital course complicated by worsening hypercarbic respiratory acidosis, placed on BiPAP w/o improvement. ICU consulted for further management.     1. HFpEF  2. COPD   3. Hypercarbic respiratory failure   4. DVT    Plan:     - Does not need ICU admission at this time, mentating appropriately following commands.   - Would recheck ABG in 1 hr to further evaluate respiratory status, taking 400+ TV on BiPAP, could consider AVAPS if necessary for hypercarbic respiratory acidosis.   - Consider solumedrol in setting of mixed HFpEF and COPD exacerbations.   - Reconsult as needed.   Case discussed w/ attending Dr. Hernandez.

## 2023-04-06 NOTE — CONSULT NOTE ADULT - SUBJECTIVE AND OBJECTIVE BOX
[facility] PALLIATIVE MEDICINE CONSULT    CC: Patient is a 83y old  Male who presents with a chief complaint of Ankle swelling and hard time breathing (2023 11:58)    HPI:  Pt unable to provide significant medical history, info obtained from chart review/staff.     Mr. Quintero is an 83 year old male with PMHx of HTN, T2DM, HFpEF, COPD on 2LNC, chronic respiratory failure on nocturnal cpap (noncompliant with use), DVT on apixaban presented with worsening dyspnea and pedal edema. ROS +low HR in recent days and feeling unwell for past couple of days. Pt also recently admitted at [facility] ~1 week ago. Initial EKG with Aflutter and CXR with mild/moderate congestion, cardiomegaly. Lab also significant for elevated BNP. Admitted for acute on chronic respiratory failure with hypoxia and hypercarbia secondary to acute decompensated heart failure. CHF team on board. Palliative consulted for support and to assist with goals of care.     Earlier this morning with worsening respiratory failure and lethargy, placed on bipap support. Repeat ABG showing worsening acidosis and CO2 retention. Pending MICU consult today.     Pt seen at bedside along with hospitalist. Pt arousable to verbal stimuli, oriented to self/hospital, able to answer simple questions and follow simple commands.       Present Symptoms:     Dyspnea: on bipap  Nausea/Vomiting:  No  Anxiety:   No  Depression: No  Fatigue: Yes   Loss of appetite: NPO while on bipap  Constipation:  None documented    Pain: No            Character-            Duration-            Effect-            Factors-            Frequency-            Location-            Severity-    Pain AD Score:  [email or web address] (press ctrl + left click to view)    Review of Systems: Reviewed                     Negative: no chest pain at rest                     Positive: see above  All others negative    PERTINENT PMH REVIEWED: Yes      PAST MEDICAL & SURGICAL HISTORY:  Congestive heart failure (CHF)  COPD (chronic obstructive pulmonary disease)  DM (diabetes mellitus)  HTN (hypertension)  BPH (benign prostatic hyperplasia)    S/P cholecystectomy  History of appendectomy    FAMILY HISTORY: Unable to obtain due to drowsiness/bipap      Allergies    No Known Allergies    Intolerances        SOCIAL HISTORY:                      Substance history:                    Admitted from:  home  lives with daughter                     Children: 1 daughter                     Yarsanism/spirituality:                    Cultural concerns:       DECISION MAKER(s):[] Health Care Proxy(s)  [] Surrogate(s)  [] Guardian             - Pt states that daughter Elizabeth is his proxy in helping him made medical decisions    ADVANCE DIRECTIVES/TREATMENT PREFERENCES: FULL CODE  DNR YES NO  Completed on:                     MOLST  YES NO   Completed on:  Living Will  YES NO   Completed on:    Karnofsky/Palliative Performance Status Version 2:  30%  [email or web address]/files/[email or web address]_[email or web address]    Baseline ADLs (prior to admission): n/a      MEDICATIONS  (STANDING):  albuterol/ipratropium for Nebulization 3 milliLiter(s) Nebulizer every 6 hours  dextrose 5%. 1000 milliLiter(s) (50 mL/Hr) IV Continuous <Continuous>  dextrose 5%. 1000 milliLiter(s) (100 mL/Hr) IV Continuous <Continuous>  dextrose 50% Injectable 25 Gram(s) IV Push once  dextrose 50% Injectable 12.5 Gram(s) IV Push once  dextrose 50% Injectable 25 Gram(s) IV Push once  furosemide   Injectable 80 milliGRAM(s) IV Push every 12 hours  glucagon  Injectable 1 milliGRAM(s) IntraMuscular once  heparin   Injectable 74536 Unit(s) IV Push once  heparin  Infusion.  Unit(s)/Hr (24 mL/Hr) IV Continuous <Continuous>  insulin lispro (ADMELOG) corrective regimen sliding scale   SubCutaneous three times a day before meals  insulin lispro (ADMELOG) corrective regimen sliding scale   SubCutaneous at bedtime  losartan 25 milliGRAM(s) Oral daily    MEDICATIONS  (PRN):  albuterol/ipratropium for Nebulization 3 milliLiter(s) Nebulizer every 3 hours PRN Shortness of Breath and/or Wheezing  dextrose Oral Gel 15 Gram(s) Oral once PRN Blood Glucose LESS THAN 70 milliGRAM(s)/deciliter  heparin   Injectable 66346 Unit(s) IV Push every 6 hours PRN For aPTT less than 40  heparin   Injectable 5000 Unit(s) IV Push every 6 hours PRN For aPTT between 40 - 57      PHYSICAL EXAM:    Vital Signs Last 24 Hrs  T(C): 36.8 (2023 07:58), Max: 37.3 (2023 00:00)  T(F): 98.2 (2023 07:58), Max: 99.1 (2023 00:00)  HR: 70 (2023 10:00) (33 - 92)  BP: 111/90 (2023 10:00) (95/52 - 136/68)  BP(mean): 98 (2023 10:00) (70 - 113)  RR: 26 (2023 10:00) (17 - 34)  SpO2: 98% (2023 10:00) (91% - 99%)    Parameters below as of 2023 10:00  Patient On (Oxygen Delivery Method): BiPAP/CPAP    O2 Concentration (%): 45    General: chronically ill. resting comfortably and no acute distress.     HEENT: mucous membrane moist      Lungs: tachypneic, on bipap     CV: S1/S2. Regular rate and rhythm    GI: +BS abdomen soft, obese, nontender     :  condom cath    MSK: moves all 4 extremities, +BLE edema. +weakness    Neuro: nonfocal. wakes to verbal stimili, oriented x 2, interactive and able to answer simple questions and follow simple commands    Skin: warm and dry. chronic stasis changes of BLE.    LABS:                        12.9   8.24  )-----------( Clumped    ( 2023 04:36 )             42.3     04-06    140  |  94<L>  |  25.5<H>  ----------------------------<  105<H>  4.2   |  38.0<H>  |  1.33<H>    Ca    8.9      2023 04:36    TPro  7.3  /  Alb  3.4  /  TBili  0.5  /  DBili  x   /  AST  20  /  ALT  17  /  AlkPhos  119  04-06    PT/INR - ( 2023 11:30 )   PT: 18.6 sec;   INR: 1.60 ratio         PTT - ( 2023 11:30 )  PTT:37.3 sec  Urinalysis Basic - ( 2023 02:00 )    Color: Yellow / Appearance: Clear / S.020 / pH: x  Gluc: x / Ketone: Negative  / Bili: Negative / Urobili: Negative mg/dL   Blood: x / Protein: Negative / Nitrite: Negative   Leuk Esterase: Trace / RBC: 6-10 /HPF / WBC 0-2 /HPF   Sq Epi: x / Non Sq Epi: x / Bacteria: x      I&O's Summary    2023 07:01  -  2023 07:00  --------------------------------------------------------  IN: 240 mL / OUT: 550 mL / NET: -310 mL    2023 07:01  -  2023 12:12  --------------------------------------------------------  IN: 0 mL / OUT: 1350 mL / NET: -1350 mL        RADIOLOGY & ADDITIONAL STUDIES:    CXR    ACC: [id number] EXAM:  XR CHEST PORTABLE URGENT 1V   ORDERED BY: SONIA CASTANON     PROCEDURE DATE:  2023    INTERPRETATION:  TECHNIQUE: Single portable view of the chest.    COMPARISON:  None    CLINICAL HISTORY: Chest Pain    FINDINGS:    Single frontal view of the chest demonstrates mild congestive changes.   Mild bibasilar atelectasis. The cardiomediastinal silhouette is enlarged.   No acute osseous abnormalities.    IMPRESSION: Mild congestive changes. Mild bibasilar atelectasis.   Cardiomegaly    --- End of Report ---  PEDRO ISABEL MD; Attending Radiologist  This document has been electronically signed. 2023 12:42PM    CXR    ACC: [id number] EXAM:  XR CHEST PORTABLE URGENT 1V   ORDERED BY: VILMA PRINCE     PROCEDURE DATE:  2023    INTERPRETATION:  TECHNIQUE: Single portable view of the chest.    COMPARISON:  2023    CLINICAL HISTORY: crackles on exam    FINDINGS:    Single frontal view of the chest demonstrates mild to moderate congestive   changes. Bibasilar atelectasis. The cardiomediastinal silhouette is   enlarged. No acute osseous abnormalities. Overlying EKG leads and wires   are noted    IMPRESSION: No interval change.    --- End of Report ---  PEDRO ISABEL MD; Attending Radiologist  This document has been electronically signed. 2023 11:24AM    TTE  Summary:   1. Technically difficult study.   2. Endocardial visualization was enhanced with intravenous echo contrast.   3. Normal global left ventricular systolic function.   4. Left ventricular ejection fraction, by visual estimation, is 55 to 60%.   5. The mitral in-flow pattern reveals no discernable A-wave, therefore   no comment on diastolic function can be made.   6. Normal left atrial size.   7. Severely enlarged right atrium.   8. Severely enlarged right ventricle.   9. Dilatation of the aortic root.  10. Sclerotic aortic valve with normal opening.  11. Mild thickening of the anterior and posterior mitral valve leaflets.  12. Trace mitral valve regurgitation.  13. Moderate tricuspid regurgitation.  14. Estimated pulmonary artery systolic pressure is 61.8 mmHg assuming a   right atrial pressure of 15 mmHg, which is consistent with severe   pulmonary hypertension.  15. There is no evidence of pericardial effusion.    Ewelina Sainz MD Electronically signed on 2023 at 8:04:19 AM  *** Final ***    NEUROLOGICAL MEDICATIONS/OPIOIDS/BENZODIAZEPINE OVER PAST 24 HOURS  ondansetron Injectable   8 milliGRAM(s) IV Push (23 @ 13:19)

## 2023-04-06 NOTE — PROGRESS NOTE ADULT - SUBJECTIVE AND OBJECTIVE BOX
[facility] Division of Hospital Medicine    Chief Complaint:  resp failure     SUBJECTIVE / OVERNIGHT EVENTS:  Pt lethargic this AM, more awake in the afternoon  Family at bedside     Patient denies chest pain, abd pain, N/V, fever, chills, dysuria or any other complaints. All remainder ROS negative.     MEDICATIONS  (STANDING):  albuterol/ipratropium for Nebulization 3 milliLiter(s) Nebulizer every 6 hours  buMETAnide Injectable 2 milliGRAM(s) IV Push once  dextrose 5%. 1000 milliLiter(s) (50 mL/Hr) IV Continuous <Continuous>  dextrose 5%. 1000 milliLiter(s) (100 mL/Hr) IV Continuous <Continuous>  dextrose 50% Injectable 25 Gram(s) IV Push once  dextrose 50% Injectable 12.5 Gram(s) IV Push once  dextrose 50% Injectable 25 Gram(s) IV Push once  furosemide   Injectable 80 milliGRAM(s) IV Push every 12 hours  glucagon  Injectable 1 milliGRAM(s) IntraMuscular once  heparin  Infusion.  Unit(s)/Hr (24 mL/Hr) IV Continuous <Continuous>  insulin lispro (ADMELOG) corrective regimen sliding scale   SubCutaneous three times a day before meals  insulin lispro (ADMELOG) corrective regimen sliding scale   SubCutaneous at bedtime  losartan 25 milliGRAM(s) Oral daily  methylPREDNISolone sodium succinate Injectable 125 milliGRAM(s) IV Push once  methylPREDNISolone sodium succinate Injectable 40 milliGRAM(s) IV Push every 8 hours  morphine  - Injectable 2 milliGRAM(s) IV Push once    MEDICATIONS  (PRN):  albuterol/ipratropium for Nebulization 3 milliLiter(s) Nebulizer every 3 hours PRN Shortness of Breath and/or Wheezing  dextrose Oral Gel 15 Gram(s) Oral once PRN Blood Glucose LESS THAN 70 milliGRAM(s)/deciliter  heparin   Injectable 41275 Unit(s) IV Push every 6 hours PRN For aPTT less than 40  heparin   Injectable 5000 Unit(s) IV Push every 6 hours PRN For aPTT between 40 - 57        I&O's Summary    2023 07:01  -  2023 07:00  --------------------------------------------------------  IN: 240 mL / OUT: 550 mL / NET: -310 mL    2023 07:01  -  2023 14:46  --------------------------------------------------------  IN: 0 mL / OUT: 1350 mL / NET: -1350 mL        PHYSICAL EXAM:  Vital Signs Last 24 Hrs  T(C): 36.8 (2023 07:58), Max: 37.3 (2023 00:00)  T(F): 98.2 (2023 07:58), Max: 99.1 (2023 00:00)  HR: 90 (2023 14:00) (33 - 92)  BP: 137/75 (2023 14:00) (110/51 - 137/75)  BP(mean): 94 (2023 14:00) (70 - 113)  RR: 26 (2023 14:00) (17 - 34)  SpO2: 100% (2023 14:00) (91% - 100%)    Parameters below as of 2023 14:00  Patient On (Oxygen Delivery Method): BiPAP/CPAP    O2 Concentration (%): 40      CONSTITUTIONAL: NAD, sitting up in bed  ENMT: Moist oral mucosa, EOMI, on BiPAP   RESPIRATORY: Fair respiratory effort; lungs with coarse BS, reduced at bases bilaterally  CARDIOVASCULAR: Regular rate and rhythm, normal S1 and S2, ++ lower extremity edema  ABDOMEN: Obese, Nontender to palpation, normoactive bowel sounds  MUSCULOSKELETAL:  No clubbing or cyanosis of digits   PSYCH: A+O to person, place; affect appropriate  NEUROLOGY: No gross sensory or motor deficits   SKIN: warm and dry       LABS:                        12.9   8.24  )-----------( Clumped    ( 2023 04:36 )             42.3     04-06    140  |  94<L>  |  25.5<H>  ----------------------------<  105<H>  4.2   |  38.0<H>  |  1.33<H>    Ca    8.9      2023 04:36    TPro  7.3  /  Alb  3.4  /  TBili  0.5  /  DBili  x   /  AST  20  /  ALT  17  /  AlkPhos  119  04-06    PT/INR - ( 2023 11:30 )   PT: 18.6 sec;   INR: 1.60 ratio         PTT - ( 2023 11:30 )  PTT:37.3 sec  CARDIAC MARKERS ( 2023 01:06 )  x     / 0.02 ng/mL / x     / x     / x          Urinalysis Basic - ( 2023 02:00 )    Color: Yellow / Appearance: Clear / S.020 / pH: x  Gluc: x / Ketone: Negative  / Bili: Negative / Urobili: Negative mg/dL   Blood: x / Protein: Negative / Nitrite: Negative   Leuk Esterase: Trace / RBC: 6-10 /HPF / WBC 0-2 /HPF   Sq Epi: x / Non Sq Epi: x / Bacteria: x        CAPILLARY BLOOD GLUCOSE      POCT Blood Glucose.: 105 mg/dL (2023 13:05)  POCT Blood Glucose.: 110 mg/dL (2023 06:07)  POCT Blood Glucose.: 154 mg/dL (2023 21:38)  POCT Blood Glucose.: 125 mg/dL (2023 15:22)   [facility] Division of Hospital Medicine    Chief Complaint:  resp failure     SUBJECTIVE / OVERNIGHT EVENTS:  Pt lethargic this AM, more awake in the afternoon  Family at bedside     Patient denies chest pain, abd pain, N/V, fever, chills, dysuria or any other complaints. All remainder ROS negative.     MEDICATIONS  (STANDING):  albuterol/ipratropium for Nebulization 3 milliLiter(s) Nebulizer every 6 hours  buMETAnide Injectable 2 milliGRAM(s) IV Push once  dextrose 5%. 1000 milliLiter(s) (50 mL/Hr) IV Continuous <Continuous>  dextrose 5%. 1000 milliLiter(s) (100 mL/Hr) IV Continuous <Continuous>  dextrose 50% Injectable 25 Gram(s) IV Push once  dextrose 50% Injectable 12.5 Gram(s) IV Push once  dextrose 50% Injectable 25 Gram(s) IV Push once  furosemide   Injectable 80 milliGRAM(s) IV Push every 12 hours  glucagon  Injectable 1 milliGRAM(s) IntraMuscular once  heparin  Infusion.  Unit(s)/Hr (24 mL/Hr) IV Continuous <Continuous>  insulin lispro (ADMELOG) corrective regimen sliding scale   SubCutaneous three times a day before meals  insulin lispro (ADMELOG) corrective regimen sliding scale   SubCutaneous at bedtime  losartan 25 milliGRAM(s) Oral daily  methylPREDNISolone sodium succinate Injectable 125 milliGRAM(s) IV Push once  methylPREDNISolone sodium succinate Injectable 40 milliGRAM(s) IV Push every 8 hours  morphine  - Injectable 2 milliGRAM(s) IV Push once    MEDICATIONS  (PRN):  albuterol/ipratropium for Nebulization 3 milliLiter(s) Nebulizer every 3 hours PRN Shortness of Breath and/or Wheezing  dextrose Oral Gel 15 Gram(s) Oral once PRN Blood Glucose LESS THAN 70 milliGRAM(s)/deciliter  heparin   Injectable 14478 Unit(s) IV Push every 6 hours PRN For aPTT less than 40  heparin   Injectable 5000 Unit(s) IV Push every 6 hours PRN For aPTT between 40 - 57        I&O's Summary    2023 07:01  -  2023 07:00  --------------------------------------------------------  IN: 240 mL / OUT: 550 mL / NET: -310 mL    2023 07:01  -  2023 14:46  --------------------------------------------------------  IN: 0 mL / OUT: 1350 mL / NET: -1350 mL        PHYSICAL EXAM:  Vital Signs Last 24 Hrs  T(C): 36.8 (2023 07:58), Max: 37.3 (2023 00:00)  T(F): 98.2 (2023 07:58), Max: 99.1 (2023 00:00)  HR: 90 (2023 14:00) (33 - 92)  BP: 137/75 (2023 14:00) (110/51 - 137/75)  BP(mean): 94 (2023 14:00) (70 - 113)  RR: 26 (2023 14:00) (17 - 34)  SpO2: 100% (2023 14:00) (91% - 100%)    Parameters below as of 2023 14:00  Patient On (Oxygen Delivery Method): BiPAP/CPAP    O2 Concentration (%): 40      CONSTITUTIONAL: NAD, sitting up in bed  ENMT: Moist oral mucosa, EOMI, on BiPAP   RESPIRATORY: Fair respiratory effort; lungs with coarse BS, reduced at bases bilaterally  CARDIOVASCULAR: Regular rate and rhythm, normal S1 and S2, ++ lower extremity edema  ABDOMEN: Obese, Nontender to palpation, normoactive bowel sounds  MUSCULOSKELETAL:  No clubbing or cyanosis of digits   PSYCH: A+O to person, place not time; affect appropriate  NEUROLOGY: No gross sensory or motor deficits   SKIN: warm and dry       LABS:                        12.9   8.24  )-----------( Clumped    ( 2023 04:36 )             42.3     04-06    140  |  94<L>  |  25.5<H>  ----------------------------<  105<H>  4.2   |  38.0<H>  |  1.33<H>    Ca    8.9      2023 04:36    TPro  7.3  /  Alb  3.4  /  TBili  0.5  /  DBili  x   /  AST  20  /  ALT  17  /  AlkPhos  119  04-06    PT/INR - ( 2023 11:30 )   PT: 18.6 sec;   INR: 1.60 ratio         PTT - ( 2023 11:30 )  PTT:37.3 sec  CARDIAC MARKERS ( 2023 01:06 )  x     / 0.02 ng/mL / x     / x     / x          Urinalysis Basic - ( 2023 02:00 )    Color: Yellow / Appearance: Clear / S.020 / pH: x  Gluc: x / Ketone: Negative  / Bili: Negative / Urobili: Negative mg/dL   Blood: x / Protein: Negative / Nitrite: Negative   Leuk Esterase: Trace / RBC: 6-10 /HPF / WBC 0-2 /HPF   Sq Epi: x / Non Sq Epi: x / Bacteria: x        CAPILLARY BLOOD GLUCOSE      POCT Blood Glucose.: 105 mg/dL (2023 13:05)  POCT Blood Glucose.: 110 mg/dL (2023 06:07)  POCT Blood Glucose.: 154 mg/dL (2023 21:38)  POCT Blood Glucose.: 125 mg/dL (2023 15:22)

## 2023-04-07 LAB
ANION GAP SERPL CALC-SCNC: 12 MMOL/L — SIGNIFICANT CHANGE UP (ref 5–17)
APTT BLD: 162.1 SEC — CRITICAL HIGH (ref 27.5–35.5)
APTT BLD: >200 SEC — CRITICAL HIGH (ref 27.5–35.5)
APTT BLD: >200 SEC — CRITICAL HIGH (ref 27.5–35.5)
BASE EXCESS BLDA CALC-SCNC: 31.2 MMOL/L — HIGH (ref -2–3)
BASOPHILS # BLD AUTO: 0.01 K/UL — SIGNIFICANT CHANGE UP (ref 0–0.2)
BASOPHILS NFR BLD AUTO: 0.2 % — SIGNIFICANT CHANGE UP (ref 0–2)
BLOOD GAS COMMENTS ARTERIAL: SIGNIFICANT CHANGE UP
BUN SERPL-MCNC: 29.1 MG/DL — HIGH (ref 8–20)
CALCIUM SERPL-MCNC: 9.1 MG/DL — SIGNIFICANT CHANGE UP (ref 8.4–10.5)
CHLORIDE SERPL-SCNC: 93 MMOL/L — LOW (ref 96–108)
CO2 SERPL-SCNC: 39 MMOL/L — HIGH (ref 22–29)
CREAT SERPL-MCNC: 1.2 MG/DL — SIGNIFICANT CHANGE UP (ref 0.5–1.3)
EGFR: 60 ML/MIN/1.73M2 — SIGNIFICANT CHANGE UP
EOSINOPHIL # BLD AUTO: 0 K/UL — SIGNIFICANT CHANGE UP (ref 0–0.5)
EOSINOPHIL NFR BLD AUTO: 0 % — SIGNIFICANT CHANGE UP (ref 0–6)
GAS PNL BLDA: SIGNIFICANT CHANGE UP
GAS PNL BLDA: SIGNIFICANT CHANGE UP
GLUCOSE BLDC GLUCOMTR-MCNC: 119 MG/DL — HIGH (ref 70–99)
GLUCOSE BLDC GLUCOMTR-MCNC: 149 MG/DL — HIGH (ref 70–99)
GLUCOSE BLDC GLUCOMTR-MCNC: 189 MG/DL — HIGH (ref 70–99)
GLUCOSE SERPL-MCNC: 166 MG/DL — HIGH (ref 70–99)
HCO3 BLDA-SCNC: 52 MMOL/L — CRITICAL HIGH (ref 21–28)
HCT VFR BLD CALC: 42 % — SIGNIFICANT CHANGE UP (ref 39–50)
HGB BLD-MCNC: 13.3 G/DL — SIGNIFICANT CHANGE UP (ref 13–17)
HOROWITZ INDEX BLDA+IHG-RTO: 45 — SIGNIFICANT CHANGE UP
IMM GRANULOCYTES NFR BLD AUTO: 0.7 % — SIGNIFICANT CHANGE UP (ref 0–0.9)
LYMPHOCYTES # BLD AUTO: 0.74 K/UL — LOW (ref 1–3.3)
LYMPHOCYTES # BLD AUTO: 13.2 % — SIGNIFICANT CHANGE UP (ref 13–44)
MCHC RBC-ENTMCNC: 31.7 GM/DL — LOW (ref 32–36)
MCHC RBC-ENTMCNC: 33.7 PG — SIGNIFICANT CHANGE UP (ref 27–34)
MCV RBC AUTO: 106.3 FL — HIGH (ref 80–100)
MONOCYTES # BLD AUTO: 0.05 K/UL — SIGNIFICANT CHANGE UP (ref 0–0.9)
MONOCYTES NFR BLD AUTO: 0.9 % — LOW (ref 2–14)
NEUTROPHILS # BLD AUTO: 4.77 K/UL — SIGNIFICANT CHANGE UP (ref 1.8–7.4)
NEUTROPHILS NFR BLD AUTO: 85 % — HIGH (ref 43–77)
PCO2 BLDA: 51 MMHG — HIGH (ref 35–48)
PH BLDA: 7.62 — CRITICAL HIGH (ref 7.35–7.45)
PLATELET # BLD AUTO: 95 K/UL — LOW (ref 150–400)
PO2 BLDA: 106 MMHG — SIGNIFICANT CHANGE UP (ref 83–108)
POTASSIUM SERPL-MCNC: 4 MMOL/L — SIGNIFICANT CHANGE UP (ref 3.5–5.3)
POTASSIUM SERPL-SCNC: 4 MMOL/L — SIGNIFICANT CHANGE UP (ref 3.5–5.3)
RBC # BLD: 3.95 M/UL — LOW (ref 4.2–5.8)
RBC # FLD: 13.4 % — SIGNIFICANT CHANGE UP (ref 10.3–14.5)
SAO2 % BLDA: 100 % — HIGH (ref 94–98)
SODIUM SERPL-SCNC: 144 MMOL/L — SIGNIFICANT CHANGE UP (ref 135–145)
TSH SERPL-MCNC: 0.62 UIU/ML — SIGNIFICANT CHANGE UP (ref 0.27–4.2)
WBC # BLD: 5.61 K/UL — SIGNIFICANT CHANGE UP (ref 3.8–10.5)
WBC # FLD AUTO: 5.61 K/UL — SIGNIFICANT CHANGE UP (ref 3.8–10.5)

## 2023-04-07 PROCEDURE — 99232 SBSQ HOSP IP/OBS MODERATE 35: CPT

## 2023-04-07 PROCEDURE — 36000 PLACE NEEDLE IN VEIN: CPT | Mod: RT

## 2023-04-07 PROCEDURE — 99233 SBSQ HOSP IP/OBS HIGH 50: CPT

## 2023-04-07 PROCEDURE — 99497 ADVNCD CARE PLAN 30 MIN: CPT | Mod: 25

## 2023-04-07 PROCEDURE — 99223 1ST HOSP IP/OBS HIGH 75: CPT

## 2023-04-07 RX ORDER — SACUBITRIL AND VALSARTAN 24; 26 MG/1; MG/1
1 TABLET, FILM COATED ORAL
Refills: 0 | Status: ACTIVE | OUTPATIENT
Start: 2023-04-07 | End: 2024-03-05

## 2023-04-07 RX ADMIN — Medication 1: at 12:52

## 2023-04-07 RX ADMIN — Medication 3 MILLILITER(S): at 21:26

## 2023-04-07 RX ADMIN — HEPARIN SODIUM 0 UNIT(S)/HR: 5000 INJECTION INTRAVENOUS; SUBCUTANEOUS at 15:55

## 2023-04-07 RX ADMIN — Medication 3 MILLILITER(S): at 08:51

## 2023-04-07 RX ADMIN — Medication 40 MILLIGRAM(S): at 05:36

## 2023-04-07 RX ADMIN — Medication 40 MILLIGRAM(S): at 21:11

## 2023-04-07 RX ADMIN — Medication 40 MILLIGRAM(S): at 12:55

## 2023-04-07 RX ADMIN — Medication 3 MILLILITER(S): at 15:42

## 2023-04-07 RX ADMIN — HEPARIN SODIUM 1200 UNIT(S)/HR: 5000 INJECTION INTRAVENOUS; SUBCUTANEOUS at 16:57

## 2023-04-07 RX ADMIN — Medication 3 MILLILITER(S): at 05:23

## 2023-04-07 RX ADMIN — HEPARIN SODIUM 0 UNIT(S)/HR: 5000 INJECTION INTRAVENOUS; SUBCUTANEOUS at 22:59

## 2023-04-07 RX ADMIN — HEPARIN SODIUM 1600 UNIT(S)/HR: 5000 INJECTION INTRAVENOUS; SUBCUTANEOUS at 08:09

## 2023-04-07 RX ADMIN — HEPARIN SODIUM 0 UNIT(S)/HR: 5000 INJECTION INTRAVENOUS; SUBCUTANEOUS at 06:48

## 2023-04-07 RX ADMIN — Medication 80 MILLIGRAM(S): at 05:35

## 2023-04-07 RX ADMIN — HEPARIN SODIUM 2000 UNIT(S)/HR: 5000 INJECTION INTRAVENOUS; SUBCUTANEOUS at 01:00

## 2023-04-07 RX ADMIN — Medication 3 MILLILITER(S): at 09:45

## 2023-04-07 RX ADMIN — SACUBITRIL AND VALSARTAN 1 TABLET(S): 24; 26 TABLET, FILM COATED ORAL at 17:15

## 2023-04-07 RX ADMIN — Medication 80 MILLIGRAM(S): at 17:14

## 2023-04-07 RX ADMIN — HEPARIN SODIUM 0 UNIT(S)/HR: 5000 INJECTION INTRAVENOUS; SUBCUTANEOUS at 07:30

## 2023-04-07 NOTE — CONSULT NOTE ADULT - CONSULT REQUESTED DATE/TIME
06-Apr-2023 12:42
05-Apr-2023 17:39
06-Apr-2023
07-Apr-2023 11:58
06-Apr-2023 11:59
06-Apr-2023 11:18

## 2023-04-07 NOTE — PROGRESS NOTE ADULT - PROBLEM SELECTOR PLAN 1
Multiple recent HF related hospitalizations. Could be exacerbated by new onset aflutter/bradycardia.   Clinically hypervolemic. Normotensive.  Diuretics: Continue lasix 80mg IV BID. Add diamox 500mg BID x 4 doses (if ok with pulmonary). Goal 24h balance - 1 to 2 liters. If unable to achieve will start bumex gtt.   GDMT: On losartan 25mg daily for h/o HTN?. Will eventually plan to add SGLT2i+MRA.  Would benefit from CardioMEMs to avoid recurrent hospitalizations.

## 2023-04-07 NOTE — PROGRESS NOTE ADULT - ASSESSMENT
83 year old male with PMH HTN, T2DM, CHFpEF, COPD on 2LNC, DVT on Apixaban presented with worsening dyspnea and pedal edema.  EKG with Atrial flutter with slow VR and Chest X-Ray with congestion. BNP 1652    Acute on Chronic respiratory failure with hypoxia and hypercapnia   Multifactorial: Acute on chronic CHF and COPD exacerbation, CHALO/OHS, Severe PH with Cor pulmonale   ECHO 4/5/23: LVEF 55-60%, severe RA and RV enlargement   - c/w telemetry   - I/O, Daily weight, fluid and salt restriction   - c/w Furosemide 80mg IV q12   - c/w Metolazone daily x 3 doses   - Per Cardio, Losartan switched to Entresto today   - Consider SLGT2i   - Appreciate Cardio and Heart Failure Consult   - c/w IV Solu-medrol   - c/w DuoNebs   - c/w BiPAP, but given significant hypercapnia, change to AVAPs per Pulm   - Appreciate Pulm consult   - Pall care consult appreciated     Atrial Flutter with slow ventricular response   - Monitor   - DC all AVN blockers   - Hold Apixaban, switch to Heparin gtt   - GRZEGORZ + Atrial Flutter ablation next week per EP   - No indication for pacemaker  - Appreciate EP consult     HTN  - Entresto  - Monitor BP     T2DM  - Carbohydrate consistent diet   - Hold home meds   - BGM with ISS   - A1c 5.9     VTE prophylaxis - Heparin gtt     Incentive Spirometry, OOB    Guarded Prognosis   Discussed with daughter at bedside

## 2023-04-07 NOTE — GOALS OF CARE CONVERSATION - ADVANCED CARE PLANNING - CONVERSATION DETAILS
Met with pt, daughter and MARILEE at bedside this afternoon along with hospitalist Dr Tao. Pt more awake compared to earlier this morning, conversant and engaged in conversation. Reviewed comorbidities (CHF, COPD on home O2, CHALO On CPAP, etc), hospital course thus far including events that has transpired early this morning, treatment and plan of care moving forward. They also spoke with MICU team earlier, aware that repeat blood work (ABG) show slight improvement and at this time not needing ICU level of care. Pt/family aware that we have to take it hour by hour at this time with hopes that his overall respiratory status will continue to improve and head in the right direction. However they are aware that status remains tenuous and that pt is not out of the woods yet. Daughter and MARILEE are trying to continue ongoing discussions with father about his wishes in regards to life sustaining treatments such as CPR and intubation in the event of cardiac or pulmonary arrest. Pt remains undecided. Daughter wants father to make decisions but he keeps stating "whatever she thinks is best." Encouraged patient to participate in decision making to help relieve burden of his loved ones making it on his behalf especially given he is awake and alert and able to involved in decision making at this time. Daughter inquired "what if he doesn't make a decision." Explained that by default he would remain FULL CODE and intubation would take place if warranted. She acknowledged understanding. Significant emotional support provided and all questions answered. Pt/family will update staff should any further decisions are made.
Spoke to patient and family at bedside. Discussed current medical conditions, prognosis and treatment options.   Pt with tenuous respiratory status yesterday and daughter acknowledges it was very difficult for her to make Intubation vs DNI decision for the pt and wishes to have further discussions with the patient so that he can make the decisions regarding code status himself. We discussed that until they make a decision, pt will remain FULL CODE by default.   Code status: Full code   Palliative care consult appreciated.   All questions and concerns answered.

## 2023-04-07 NOTE — CONSULT NOTE ADULT - ASSESSMENT
83 year old male former smoker (stopped 7/2022) with HTN, DM 2, HFpEF (LVEF: 55-60%), mod-severe pulmonary hypertension, BPH, COPD (on 4LNC since 7/2022), and DVT (on Apixaban) who presents with recurrent acute on chronic HFpEF and AFL with slow ventricular response for which EP is consulted.    His presenting bradycardia was likely due to a combination of hypoxia and stacking of AVN blockers in setting of BRANDEN. Now that AVN blockers have been held and his hypoxia has resolved, his rates are significantly improved. He even accelerates into the 140s now when ambulating. Given improvement in rates, I do not think there is any indication for pacemaker at this time. Review of ECGs from Dr. Booker's office reveal only atrial flutter. I think he would benefit with restoration of normal rhythm with catheter ablation of atrial flutter since he has had paroxysmal atrial flutter in the past and this is likely contributing to his CHF exacerbations. It could also obviate the need for AVN blockers which can worsen his bradycardia.    We will plan for GRZEGORZ + flutter ablation early next week provided he is closer to euvolemic.    Recommendations:  - GRZGEORZ + Atrial Flutter ablation next week once closer to euvolemia  - No indication for pacemaker  - Continue with CHF treatment  - Avoid AVN blockers for now    Discussed with CHF team and Dr. Booker.    Thank you for this consult. We will follow with you.    Shane Woodward MD  Clinical Cardiac Electrophysiology

## 2023-04-07 NOTE — PROGRESS NOTE ADULT - SUBJECTIVE AND OBJECTIVE BOX
S: Patient denies chest pain.    Events: Intermittent episodes of dyspnea (2023) treated with BIPAP,  IV bumex and IV morphine with improvement.     TELEMETRY: sr    MEDICATIONS  (STANDING):  albuterol/ipratropium for Nebulization 3 milliLiter(s) Nebulizer every 6 hours  dextrose 5%. 1000 milliLiter(s) (50 mL/Hr) IV Continuous <Continuous>  dextrose 5%. 1000 milliLiter(s) (100 mL/Hr) IV Continuous <Continuous>  dextrose 50% Injectable 25 Gram(s) IV Push once  dextrose 50% Injectable 12.5 Gram(s) IV Push once  dextrose 50% Injectable 25 Gram(s) IV Push once  furosemide   Injectable 80 milliGRAM(s) IV Push every 12 hours  glucagon  Injectable 1 milliGRAM(s) IntraMuscular once  heparin  Infusion. 2000 Unit(s)/Hr (20 mL/Hr) IV Continuous <Continuous>  insulin lispro (ADMELOG) corrective regimen sliding scale   SubCutaneous three times a day before meals  insulin lispro (ADMELOG) corrective regimen sliding scale   SubCutaneous at bedtime  losartan 25 milliGRAM(s) Oral daily  methylPREDNISolone sodium succinate Injectable 40 milliGRAM(s) IV Push every 8 hours    MEDICATIONS  (PRN):  albuterol/ipratropium for Nebulization 3 milliLiter(s) Nebulizer every 3 hours PRN Shortness of Breath and/or Wheezing  dextrose Oral Gel 15 Gram(s) Oral once PRN Blood Glucose LESS THAN 70 milliGRAM(s)/deciliter  heparin   Injectable 27034 Unit(s) IV Push every 6 hours PRN For aPTT less than 40  heparin   Injectable 5000 Unit(s) IV Push every 6 hours PRN For aPTT between 40 - 57  morphine  - Injectable 0.5 milliGRAM(s) IV Push every 6 hours PRN increased work of breathing        Vital Signs Last 24 Hrs  T(C): 36.6 (2023 04:00), Max: 37.3 (2023 20:00)  T(F): 97.9 (2023 04:00), Max: 99.1 (2023 20:00)  HR: 78 (2023 09:20) (63 - 97)  BP: 133/87 (2023 08:00) (109/82 - 157/119)  BP(mean): 99 (2023 08:00) (79 - 131)  RR: 20 (2023 08:00) (14 - 30)  SpO2: 94% (2023 09:20) (94% - 100%)    Parameters below as of 2023 09:20  Patient On (Oxygen Delivery Method): nasal cannula,3L        Daily Height in cm: 182.88 (2023 14:19)    Daily     I&O's Detail    2023 07:01  -  2023 07:00  --------------------------------------------------------  IN:    Heparin Infusion: 252 mL    Heparin Infusion: 120 mL  Total IN: 372 mL    OUT:    Voided (mL): 4500 mL  Total OUT: 4500 mL    Total NET: -4128 mL      2023 07:01  -  2023 09:42  --------------------------------------------------------  IN:    Heparin Infusion: 16 mL  Total IN: 16 mL    OUT:    Voided (mL): 750 mL  Total OUT: 750 mL    Total NET: -734 mL          PHYSICAL EXAM:  Appearance: In NAD  Neck: No JVD,   Cardiovascular: Normal S1 S2  Respiratory: Lungs clear to auscultation	  Gastrointestinal:  Soft, Non-tender, + BS, no bruits	  Neurologic: Grossly non-focal.  Extremities: No edema    LABS:                        13.3   5.61  )-----------( 95       ( 2023 04:37 )             42.0     04-07    144  |  93<L>  |  29.1<H>  ----------------------------<  166<H>  4.0   |  39.0<H>  |  1.20    Ca    9.1      2023 04:37    TPro  7.3  /  Alb  3.4  /  TBili  0.5  /  DBili  x   /  AST  20  /  ALT  17  /  AlkPhos  119  04-06        PT/INR - ( 2023 11:30 )   PT: 18.6 sec;   INR: 1.60 ratio         PTT - ( 2023 04:50 )  PTT:>200.0 sec  Urinalysis Basic - ( 2023 02:00 )    Color: Yellow / Appearance: Clear / S.020 / pH: x  Gluc: x / Ketone: Negative  / Bili: Negative / Urobili: Negative mg/dL   Blood: x / Protein: Negative / Nitrite: Negative   Leuk Esterase: Trace / RBC: 6-10 /HPF / WBC 0-2 /HPF   Sq Epi: x / Non Sq Epi: x / Bacteria: x      I&O's Summary    2023 07:01  -  2023 07:00  --------------------------------------------------------  IN: 372 mL / OUT: 4500 mL / NET: -4128 mL    2023 07:01  -  2023 09:42  --------------------------------------------------------  IN: 16 mL / OUT: 750 mL / NET: -734 mL

## 2023-04-07 NOTE — PROGRESS NOTE ADULT - ASSESSMENT
82 y/o male with chronic HFpEF ACC/AHA stage C, recurrent CHF hospitalizations, COPD on home O2 (4 LPM via NC) since July 2022, former smoker (stopped 7/2022), DM2, DVT on Eliquis admitted with dyspnea, edema, weight gain and new atrial flutter.   1. Acute on chronic HFpEF, elevated BNP, trop neg x2, no evidence of ACS,   2. newly diagnosed Aflutter with variable block (VR 30-40s) with tachybrady/SSS   3. COPD exacerbation, hypercapnea, (04/06/2023), pH 7.62 HCO3 52.4 Reported, today BMP HCO2 - 39  4. s/p TTE 4/5/23: LVEF 55-60%, LVIDd 5.47cm, severe RA and RV enlargement, trace MR, moderate TR, PASP 61.8 mmHg (RA 15 mmHg)      Plan:  Heart Failure consult appreciated.   Continue IV lasix 80 mg q 12 hours  Change to entresto BID, hold for SBP <110, d/c losartan.   EP consultation from Dr. Pang appreciated, ppm planned for early next week.   Eliquis held, patient is on bridge IV heparin.

## 2023-04-07 NOTE — PROGRESS NOTE ADULT - ASSESSMENT
Acute on chronic hypoxemic, hypercapnic resp failure  COPD, OHS /CHALO /HFpEF   Severe PH with Cor pulmonale  Better on Bipap, but remains sig hypercapnic, changed to AVAPs- repeat ABG  Eventual wean to nocturnal only- will need to verify from home care company type of machine he has- family aware  Continue nebs, add medrol      This morning taken off AVAPS and has been on his nasal Oxygen at 3 L   Was able to get OOB to chair  Good spirits, ate breakfast this morning and looks forward to dinner    Would continue nocturnal AVAPS  For ECHO and EP study next week Acute on chronic hypoxemic, hypercapnic resp failure  COPD, OHS /CHALO /HFpEF   Severe PH with Cor pulmonale  Better on Bipap, but remains sig hypercapnic, changed to AVAPs- repeat ABG  Eventual wean to nocturnal only- will need to verify from home care company type of machine he has- family aware  Continue nebs, add medrol      This morning taken off AVAPS and has been on his nasal Oxygen at 3 L   Was able to get OOB to chair  Good spirits, ate breakfast this morning and looks forward to dinner    Would continue nocturnal AVAPS  Continue monitoring HCO3 and acid base status  For ECHO and EP study next week

## 2023-04-07 NOTE — PROGRESS NOTE ADULT - PROBLEM SELECTOR PLAN 1
LVEF 55-60%, severe biatrial enlargement, moderate TR   Multiple recent HF related hospitalizations. Could be exacerbated by new onset aflutter/bradycardia.   Clinically hypervolemic. Normotensive.  Needs aggressive diuresis.  Diuretics: Continue Lasix 80mg IV BID. Goal net negative 2L/day.  GDMT: Reasonable to switch Losartan to low dose Entresto 24-26mg BID. Will eventually plan to add SGLT2i+MRA. Avoid BB for now.   Low Na+, 1.5L FR diet advised.  Please document strict I&O and daily standing weights.  Would likely benefit from CardioMEMs device to avoid recurrent hospitalizations if patient can prove compliance.

## 2023-04-07 NOTE — PROCEDURE NOTE - NSCOMPLICATION_GEN_A_CORE
Bedside and Verbal shift change report given to Donald Campbell RN (oncoming nurse) by Mainor Johnson RN (offgoing nurse). Report included the following information SBAR, Kardex, Intake/Output, MAR and Recent Results. no complications

## 2023-04-07 NOTE — CONSULT NOTE ADULT - SUBJECTIVE AND OBJECTIVE BOX
Electrophysiology Attending Consult Note    HPI:  83 year old male with PMH HTN, T2DM, CHFpEF, COPD on 2LNC, DVT on Apixaban presented with worsening dyspnea and pedal edema. Also with low heart rate recently. He states he's been worse for past couple days although he's been sick for a couple of months and was also hospitalized in [facility] for 1 week.  Denies any dizziness or chest pain but has cough productive of white phlegm. Denies any dietary indiscretion or medications non-compliance.   (2023 15:12)      PAST MEDICAL & SURGICAL HISTORY:  Congestive heart failure (CHF)  COPD (chronic obstructive pulmonary disease)  DM (diabetes mellitus)  HTN (hypertension)  BPH (benign prostatic hyperplasia)  S/P cholecystectomy  History of appendectomy    REVIEW OF SYSTEMS: As per HPI. Remaining 10 point ROS were reviewed and are negative.    MEDICATIONS  (STANDING):  albuterol/ipratropium for Nebulization 3 milliLiter(s) Nebulizer every 6 hours  dextrose 5%. 1000 milliLiter(s) (50 mL/Hr) IV Continuous <Continuous>  dextrose 5%. 1000 milliLiter(s) (100 mL/Hr) IV Continuous <Continuous>  dextrose 50% Injectable 25 Gram(s) IV Push once  dextrose 50% Injectable 12.5 Gram(s) IV Push once  dextrose 50% Injectable 25 Gram(s) IV Push once  furosemide   Injectable 80 milliGRAM(s) IV Push every 12 hours  glucagon  Injectable 1 milliGRAM(s) IntraMuscular once  heparin  Infusion. 2000 Unit(s)/Hr (20 mL/Hr) IV Continuous <Continuous>  insulin lispro (ADMELOG) corrective regimen sliding scale   SubCutaneous three times a day before meals  insulin lispro (ADMELOG) corrective regimen sliding scale   SubCutaneous at bedtime  methylPREDNISolone sodium succinate Injectable 40 milliGRAM(s) IV Push every 8 hours  sacubitril 24 mG/valsartan 26 mG 1 Tablet(s) Oral two times a day    MEDICATIONS  (PRN):  albuterol/ipratropium for Nebulization 3 milliLiter(s) Nebulizer every 3 hours PRN Shortness of Breath and/or Wheezing  dextrose Oral Gel 15 Gram(s) Oral once PRN Blood Glucose LESS THAN 70 milliGRAM(s)/deciliter  heparin   Injectable 66588 Unit(s) IV Push every 6 hours PRN For aPTT less than 40  heparin   Injectable 5000 Unit(s) IV Push every 6 hours PRN For aPTT between 40 - 57  morphine  - Injectable 0.5 milliGRAM(s) IV Push every 6 hours PRN increased work of breathing    Allergies  No Known Allergies    Intolerances    SOCIAL HISTORY:    FAMILY HISTORY:      Vital Signs Last 24 Hrs  T(C): 36.6 (2023 04:00), Max: 37.3 (2023 20:00)  T(F): 97.9 (2023 04:00), Max: 99.1 (2023 20:00)  HR: 96 (2023 10:00) (63 - 97)  BP: 126/72 (2023 10:00) (109/82 - 157/119)  BP(mean): 78 (2023 10:00) (78 - 131)  RR: 20 (2023 10:00) (14 - 30)  SpO2: 96% (2023 10:00) (94% - 100%)    Parameters below as of 2023 10:00  Patient On (Oxygen Delivery Method): nasal cannula  O2 Flow (L/min): 3      Physical Exam:  Constitutional: Well-developed, well nourished, in no acute distress  Head: normocephalic atraumatic   Eyes:  extraocular movements intact, sclera anicteric  ENT: mucous membranes moist, pharynx no erythema or swelling  Neck: supple, full range of motion, nontender to palpation midline  Chest wall: normal in appearance, nontender to palpation  Resp: effort normal, breath sounds clear to auscultation bilaterally  Cardiac: Heart regular rate and rhythm, no murmurs, rubs, or gallops.  No edema.  Abdomen: soft, nondistended, bowel sounds active, nontender to palpation in all four quadrants    Back: no costovertebral angle tenderness    Musculoskeletal: full range of motion all extremities with no pain, tenderness, swelling, or erythema    Neuro: Alert and oriented x 3, motor & sensation grossly in tact  Skin: color normal, no rashes or injury  Psych: mood calm    LABS:                        13.3   5.61  )-----------( 95       ( 2023 04:37 )             42.0   04-07    144  |  93<L>  |  29.1<H>  ----------------------------<  166<H>  4.0   |  39.0<H>  |  1.20    Ca    9.1      2023 04:37    TPro  7.3  /  Alb  3.4  /  TBili  0.5  /  DBili  x   /  AST  20  /  ALT  17  /  AlkPhos  119  04-06  LIVER FUNCTIONS - ( 2023 04:36 )  Alb: 3.4 g/dL / Pro: 7.3 g/dL / ALK PHOS: 119 U/L / ALT: 17 U/L / AST: 20 U/L / GGT: x           PT/INR - ( 2023 11:30 )   PT: 18.6 sec;   INR: 1.60 ratio         PTT - ( 2023 04:50 )  PTT:>200.0 sec    Urinalysis Basic - ( 2023 02:00 )    Color: Yellow / Appearance: Clear / S.020 / pH: x  Gluc: x / Ketone: Negative  / Bili: Negative / Urobili: Negative mg/dL   Blood: x / Protein: Negative / Nitrite: Negative   Leuk Esterase: Trace / RBC: 6-10 /HPF / WBC 0-2 /HPF   Sq Epi: x / Non Sq Epi: x / Bacteria: x        RADIOLOGY & ADDITIONAL STUDIES:                                                            [facility]                                                               CARDIAC ELECTROPHYSIOLOGY                                                       [facility] [place]                                                      Office: [address] [address]                                                       Telephone: [phone]. Fax:[phone]    HPI:  Candido Quintero is an 83 year old male former smoker (stopped 2022) with HTN, DM 2, HFpEF (LVEF: 55-60%), mod-severe pulmonary hypertension, BPH, COPD (on 4LNC since 2022), and DVT (on Apixaban) who presents with dyspnea and bradycardia. The patient presented to Dr. Booker's office with complaints of worsening dyspnea on exertion and lower extremity edema. In the ER, atrial flutter revealed variable block with rates in the 30-40s. He has had a cough with phlegm without fevers or chills. Unfortunately he has had multiple hospitalizations over the past 12 months with 3-4 hospitalizations to [facility].    He had respiratory failure requiring BiPAP and aggressive diuresis. AVN blockers were held. He was also treated with IV steroids for possible COPD exacerbation. Fortunately, his O2 requirements have improved with the above interventions and his HR has improved into the 90s, at times as fast at 140 bpm.    Today he feels better and notes that his breathing is improving.    ECG: Typical atrial flutter with slow ventricular response.    TELE: Atrial flutter with rates ~90 bpm.    Echo:  23 TTE: LVEF: 55-60%. Normal LA (LAd 4.6 cm, ARACELI 18.2 mL/m2). RV severely enlarged. Severely enlarged RA. Moderate TR (RVSP: 62 mmHg; severe pHTN). Aortic root dilation.    Radiology:  23 CXR: Moderate congestive changes. Bibasilar atelectasis.    PAST MEDICAL & SURGICAL HISTORY:  Congestive heart failure (CHF)  COPD (chronic obstructive pulmonary disease)  DM (diabetes mellitus)  HTN (hypertension)  BPH (benign prostatic hyperplasia)  S/P cholecystectomy  History of appendectomy    REVIEW OF SYSTEMS: As per HPI. Remaining 10 point ROS were reviewed and are negative.    MEDICATIONS  (STANDING):  albuterol/ipratropium for Nebulization 3 milliLiter(s) Nebulizer every 6 hours  dextrose 5%. 1000 milliLiter(s) (50 mL/Hr) IV Continuous <Continuous>  dextrose 5%. 1000 milliLiter(s) (100 mL/Hr) IV Continuous <Continuous>  dextrose 50% Injectable 25 Gram(s) IV Push once  dextrose 50% Injectable 12.5 Gram(s) IV Push once  dextrose 50% Injectable 25 Gram(s) IV Push once  furosemide   Injectable 80 milliGRAM(s) IV Push every 12 hours  glucagon  Injectable 1 milliGRAM(s) IntraMuscular once  heparin  Infusion. 2000 Unit(s)/Hr (20 mL/Hr) IV Continuous <Continuous>  insulin lispro (ADMELOG) corrective regimen sliding scale   SubCutaneous three times a day before meals  insulin lispro (ADMELOG) corrective regimen sliding scale   SubCutaneous at bedtime  methylPREDNISolone sodium succinate Injectable 40 milliGRAM(s) IV Push every 8 hours  sacubitril 24 mG/valsartan 26 mG 1 Tablet(s) Oral two times a day    MEDICATIONS  (PRN):  albuterol/ipratropium for Nebulization 3 milliLiter(s) Nebulizer every 3 hours PRN Shortness of Breath and/or Wheezing  dextrose Oral Gel 15 Gram(s) Oral once PRN Blood Glucose LESS THAN 70 milliGRAM(s)/deciliter  heparin   Injectable 53818 Unit(s) IV Push every 6 hours PRN For aPTT less than 40  heparin   Injectable 5000 Unit(s) IV Push every 6 hours PRN For aPTT between 40 - 57  morphine  - Injectable 0.5 milliGRAM(s) IV Push every 6 hours PRN increased work of breathing    Allergies  No Known Allergies    Intolerances    SOCIAL HISTORY:  [id number] years (90 pack/yr smoking history); quit 2022  Denies EtOH or illicit drug use    FAMILY HISTORY:  Non-contributory    Vital Signs Last 24 Hrs  T(C): 36.6 (2023 04:00), Max: 37.3 (2023 20:00)  T(F): 97.9 (2023 04:00), Max: 99.1 (2023 20:00)  HR: 96 (2023 10:00) (63 - 97)  BP: 126/72 (2023 10:00) (109/82 - 157/119)  BP(mean): 78 (2023 10:00) (78 - 131)  RR: 20 (2023 10:00) (14 - 30)  SpO2: 96% (2023 10:00) (94% - 100%)    Parameters below as of 2023 10:00  Patient On (Oxygen Delivery Method): nasal cannula  O2 Flow (L/min): 3    Physical Exam:  Constitutional: Obese, in no acute distress  Head: normocephalic atraumatic   Eyes:  extraocular movements intact, sclera anicteric  ENT: mucous membranes moist, pharynx no erythema or swelling  Chest wall: normal in appearance, nontender to palpation  Resp: effort normal, Bilateral rales across all lung fields, no rhonchi or wheezes  Cardiac: Heart regular rate and rhythm, no murmurs, rubs, or gallops. 2+ bilateral pitting edema.  Abdomen: soft, nondistended, bowel sounds active, nontender to palpation in all four quadrants    Musculoskeletal: full range of motion all extremities with no pain, tenderness, swelling, or erythema    Neuro: Alert and oriented x 3, motor & sensation grossly in tact  Skin: color normal, no rashes or injury  Psych: mood calm    LABS:                        13.3   5.61  )-----------( 95       ( 2023 04:37 )             42.0   04-07    144  |  93<L>  |  29.1<H>  ----------------------------<  166<H>  4.0   |  39.0<H>  |  1.20    Ca    9.1      2023 04:37    TPro  7.3  /  Alb  3.4  /  TBili  0.5  /  DBili  x   /  AST  20  /  ALT  17  /  AlkPhos  119  04-06  LIVER FUNCTIONS - ( 2023 04:36 )  Alb: 3.4 g/dL / Pro: 7.3 g/dL / ALK PHOS: 119 U/L / ALT: 17 U/L / AST: 20 U/L / GGT: x           PT/INR - ( 2023 11:30 )   PT: 18.6 sec;   INR: 1.60 ratio         PTT - ( 2023 04:50 )  PTT:>200.0 sec    Urinalysis Basic - ( 2023 02:00 )    Color: Yellow / Appearance: Clear / S.020 / pH: x  Gluc: x / Ketone: Negative  / Bili: Negative / Urobili: Negative mg/dL   Blood: x / Protein: Negative / Nitrite: Negative   Leuk Esterase: Trace / RBC: 6-10 /HPF / WBC 0-2 /HPF   Sq Epi: x / Non Sq Epi: x / Bacteria: x

## 2023-04-07 NOTE — CONSULT NOTE ADULT - REASON FOR ADMISSION
Ankle swelling and hard time breathing

## 2023-04-07 NOTE — PROGRESS NOTE ADULT - ASSESSMENT
83M with hx of HTN, DM2, HFpEF, COPD on home O2 (2L), chronic respiratory failure on nocturnal cpap (noncompliant with use), DVT on apixaban admitted for acute on chronic respiratory failure with hypoxia and hypercarbia secondary to acute decompensated heart failure and new onset atrial flutter. Palliative was consulted for support and to assist with goals of care.     Acute on Chronic Respiratory Failure with Hypoxia + Hypercarbia  - on home O2 NC 2L and nocturnal Cpap (noncompliant)  - etiology likely multifactorial: CHF, COPD, CHALO/OHS  - off bipap to nasal cannula today  - Pulm following  - nebs ATC, O2 supplement   - Education provided to pt/daughter on the importance of compliance with nocturnal cpap/AVAPs when he goes home to reduce/prevent future exacerbations and hospitalizations      Acute Decompensated HFpEF  - elevated BNP and CXR with congestion  - TTE with EF 55-60%, severe pulm HTN  - on IV lasix  - CHF team following, input appreciated  - cardio consult noted, input appreciated     Palliative Care Encounter  - HCP/surrogate: daughter Alma    - FULL CODE  - Daughter shared she will continue to have ongoing discussions regarding directives with father as she is aware these exacerbations are likely to occur again and wishes for father to participate in his own decision making.  MOLST form was provided to them yesterday for reference.  - GOC is to continue all active medical interventions with hopes of eventually returning home  - Palliative team will continue to support and follow clinical progress

## 2023-04-07 NOTE — PROGRESS NOTE ADULT - ASSESSMENT
Primary Cardiology: [facility]  HF Cardiologist: Dr. Carreon    84 y/o male, former smoker (quit 7/2022), with h/o chronic HFpEF ACC/AHA stage C, COPD on home O2 (4 LPM via NC) since July 2022, DM2, DVT on Eliquis who was referred from Dr. Booker's office due to ADHF.   Upon arrival to the ED he was found to have newly diagnosed Aflutter with variable block (VR 30-40s). His BP was stable and he denied symptoms associated to bradycardia. His labs were remarkable for elevated pBNP, normal trop, thrombocytopenia and elevated CO2. His CxR showed pulmonary congestion. He was given IV lasix, glucagon and calcium. His AV node blocker agents were held.   This is his 3rd or 4th hospitalization in the past 12 months. He has been admitted multiple times to [facility].     HR has improved. Now off continuous BiPAP. Good UOP response to lasix however needs continued aggressive diuresis.     Cardiac Studies:  TTE 4/5/23: LVEF 55-60%, LVIDd 5.47cm, severe RA and RV enlargement, trace MR, moderate TR, PASP 61.8 mmHg (RA 15 mmHg).

## 2023-04-07 NOTE — PROVIDER CONTACT NOTE (CRITICAL VALUE NOTIFICATION) - ACTION/TREATMENT ORDERED:
to follow heparin nomogram,as pt will be going for a cardiac ablation and needs to be on heparin
follow Nomogram hold for an hour decrease by 4

## 2023-04-07 NOTE — PROGRESS NOTE ADULT - SUBJECTIVE AND OBJECTIVE BOX
PULMONARY PROGRESS NOTE      [id number]    Patient is a 83y old  Male who presents with a chief complaint of Ankle swelling and hard time breathing (2023 12:43)      INTERVAL HPI/OVERNIGHT EVENTS:    MEDICATIONS  (STANDING):  albuterol/ipratropium for Nebulization 3 milliLiter(s) Nebulizer every 6 hours  dextrose 5%. 1000 milliLiter(s) (50 mL/Hr) IV Continuous <Continuous>  dextrose 5%. 1000 milliLiter(s) (100 mL/Hr) IV Continuous <Continuous>  dextrose 50% Injectable 25 Gram(s) IV Push once  dextrose 50% Injectable 12.5 Gram(s) IV Push once  dextrose 50% Injectable 25 Gram(s) IV Push once  furosemide   Injectable 80 milliGRAM(s) IV Push every 12 hours  glucagon  Injectable 1 milliGRAM(s) IntraMuscular once  heparin  Infusion. 2000 Unit(s)/Hr (20 mL/Hr) IV Continuous <Continuous>  insulin lispro (ADMELOG) corrective regimen sliding scale   SubCutaneous three times a day before meals  insulin lispro (ADMELOG) corrective regimen sliding scale   SubCutaneous at bedtime  methylPREDNISolone sodium succinate Injectable 40 milliGRAM(s) IV Push every 8 hours  sacubitril 24 mG/valsartan 26 mG 1 Tablet(s) Oral two times a day      MEDICATIONS  (PRN):  albuterol/ipratropium for Nebulization 3 milliLiter(s) Nebulizer every 3 hours PRN Shortness of Breath and/or Wheezing  dextrose Oral Gel 15 Gram(s) Oral once PRN Blood Glucose LESS THAN 70 milliGRAM(s)/deciliter  heparin   Injectable 39362 Unit(s) IV Push every 6 hours PRN For aPTT less than 40  heparin   Injectable 5000 Unit(s) IV Push every 6 hours PRN For aPTT between 40 - 57  morphine  - Injectable 0.5 milliGRAM(s) IV Push every 6 hours PRN increased work of breathing      Allergies    No Known Allergies    Intolerances        PAST MEDICAL & SURGICAL HISTORY:  Congestive heart failure (CHF)      COPD (chronic obstructive pulmonary disease)      DM (diabetes mellitus)      HTN (hypertension)      BPH (benign prostatic hyperplasia)      S/P cholecystectomy      History of appendectomy          SOCIAL HISTORY  Smoking History:       REVIEW OF SYSTEMS:    CONSTITUTIONAL:  No distress    HEENT:  Eyes:  No diplopia or blurred vision. ENT:  No earache, sore throat or runny nose.    CARDIOVASCULAR:  No pressure, squeezing, tightness, heaviness or aching about the chest; no palpitations.    RESPIRATORY:  No cough, shortness of breath, PND or orthopnea. Mild SOBOE    GASTROINTESTINAL:  No nausea, vomiting or diarrhea.    GENITOURINARY:  No dysuria, frequency or urgency.    MUSCULOSKELETAL:  No joint pain    SKIN:  No new lesions.    NEUROLOGIC:  No paresthesias, fasciculations, seizures or weakness.    PSYCHIATRIC:  No disorder of thought or mood.    ENDOCRINE:  No heat or cold intolerance, polyuria or polydipsia.    HEMATOLOGICAL:  No easy bruising or bleeding.     Vital Signs Last 24 Hrs  T(C): 36.6 (2023 04:00), Max: 37.3 (2023 20:00)  T(F): 97.9 (2023 04:00), Max: 99.1 (2023 20:00)  HR: 95 (2023 12:00) (63 - 97)  BP: 130/65 (2023 12:00) (109/82 - 157/119)  BP(mean): 79 (2023 12:00) (78 - 131)  RR: 18 (2023 12:00) (14 - 30)  SpO2: 96% (2023 12:00) (94% - 100%)    Parameters below as of 2023 12:00  Patient On (Oxygen Delivery Method): nasal cannula  O2 Flow (L/min): 3      PHYSICAL EXAMINATION:    GENERAL: The patient is awake and alert in no apparent distress.     HEENT: Head is normocephalic and atraumatic. Extraocular muscles are intact. Mucous membranes are moist.    NECK: Supple.    LUNGS: Clear to auscultation without wheezing, rales or rhonchi; respirations unlabored    HEART: Regular rate and rhythm without murmur.    ABDOMEN: Soft, nontender, and nondistended.      EXTREMITIES: Without any cyanosis, clubbing, rash, lesions or edema.    NEUROLOGIC: Grossly intact.    SKIN: No ulceration or induration present.      LABS:                        13.3   5.61  )-----------( 95       ( 2023 04:37 )             42.0     04-07    144  |  93<L>  |  29.1<H>  ----------------------------<  166<H>  4.0   |  39.0<H>  |  1.20    Ca    9.1      2023 04:37    TPro  7.3  /  Alb  3.4  /  TBili  0.5  /  DBili  x   /  AST  20  /  ALT  17  /  AlkPhos  119  04-06    PT/INR - ( 2023 11:30 )   PT: 18.6 sec;   INR: 1.60 ratio         PTT - ( 2023 04:50 )  PTT:>200.0 sec  Urinalysis Basic - ( 2023 02:00 )    Color: Yellow / Appearance: Clear / S.020 / pH: x  Gluc: x / Ketone: Negative  / Bili: Negative / Urobili: Negative mg/dL   Blood: x / Protein: Negative / Nitrite: Negative   Leuk Esterase: Trace / RBC: 6-10 /HPF / WBC 0-2 /HPF   Sq Epi: x / Non Sq Epi: x / Bacteria: x      ABG - ( 2023 11:24 )  pH, Arterial: 7.470 pH, Blood: x     /  pCO2: 69    /  pO2: 69    / HCO3: 50    / Base Excess: 26.5  /  SaO2: 96.2                            MICROBIOLOGY:    RADIOLOGY & ADDITIONAL STUDIES:

## 2023-04-07 NOTE — PROGRESS NOTE ADULT - SUBJECTIVE AND OBJECTIVE BOX
[facility] PALLIATIVE MEDICINE     CC: FOLLOW UP VISIT + GOC    INTERVAL HPI/OVERNIGHT EVENTS:  Source if other than patient:  []Family   [x]Team     Seen at bedside, daughter Alma also present.  Off bipap to nasal cannula; sitting comfortably in chair eating breakfast.   Pt in good spirits and eager to go home soon    PRESENT SYMPTOMS:     Dyspnea: No  Nausea/Vomiting:  No  Anxiety:   No  Depression:  No  Fatigue:  No  Loss of appetite:  No  Constipation:  None documented    Pain: No            Character-            Duration-            Effect-            Factors-            Frequency-            Location-            Severity-    Pain AD Score:  [email or web address] (press ctrl + left click to view)    Review of Systems: Reviewed                     Negative: no chest pain or dyspnea at rest                     Positive: see above  All others negative    MEDICATIONS  (STANDING):  albuterol/ipratropium for Nebulization 3 milliLiter(s) Nebulizer every 6 hours  dextrose 5%. 1000 milliLiter(s) (50 mL/Hr) IV Continuous <Continuous>  dextrose 5%. 1000 milliLiter(s) (100 mL/Hr) IV Continuous <Continuous>  dextrose 50% Injectable 25 Gram(s) IV Push once  dextrose 50% Injectable 12.5 Gram(s) IV Push once  dextrose 50% Injectable 25 Gram(s) IV Push once  furosemide   Injectable 80 milliGRAM(s) IV Push every 12 hours  glucagon  Injectable 1 milliGRAM(s) IntraMuscular once  heparin  Infusion. 2000 Unit(s)/Hr (20 mL/Hr) IV Continuous <Continuous>  insulin lispro (ADMELOG) corrective regimen sliding scale   SubCutaneous three times a day before meals  insulin lispro (ADMELOG) corrective regimen sliding scale   SubCutaneous at bedtime  methylPREDNISolone sodium succinate Injectable 40 milliGRAM(s) IV Push every 8 hours  sacubitril 24 mG/valsartan 26 mG 1 Tablet(s) Oral two times a day    MEDICATIONS  (PRN):  albuterol/ipratropium for Nebulization 3 milliLiter(s) Nebulizer every 3 hours PRN Shortness of Breath and/or Wheezing  dextrose Oral Gel 15 Gram(s) Oral once PRN Blood Glucose LESS THAN 70 milliGRAM(s)/deciliter  heparin   Injectable 38804 Unit(s) IV Push every 6 hours PRN For aPTT less than 40  heparin   Injectable 5000 Unit(s) IV Push every 6 hours PRN For aPTT between 40 - 57  morphine  - Injectable 0.5 milliGRAM(s) IV Push every 6 hours PRN increased work of breathing      PHYSICAL EXAM:    Vital Signs Last 24 Hrs  T(C): 36.6 (2023 04:00), Max: 37.3 (2023 20:00)  T(F): 97.9 (2023 04:00), Max: 99.1 (2023 20:00)  HR: 96 (2023 10:00) (63 - 97)  BP: 126/72 (2023 10:00) (109/82 - 157/119)  BP(mean): 78 (2023 10:00) (78 - 131)  RR: 20 (2023 10:00) (14 - 30)  SpO2: 96% (2023 10:00) (94% - 100%)    Parameters below as of 2023 10:00  Patient On (Oxygen Delivery Method): nasal cannula  O2 Flow (L/min): 3    Karnofsky:  40%    GEN: chronically ill. resting comfortably and no acute distress    HEENT: mmm    Lungs: comfortable, nonlabored     CV: +s1/s2 regular rate and rhythm     GI: +BS, abdomen obese, nontender, nondistended      MSK: moves all 4 extremities, no cyanosis. + edema  +weakness    NEURO: nonfocal. awake and alert, oriented x 4, interactive    Skin: warm and dry.       LABS:                          13.3   5.61  )-----------( 95       ( 2023 04:37 )             42.0     04-07    144  |  93<L>  |  29.1<H>  ----------------------------<  166<H>  4.0   |  39.0<H>  |  1.20    Ca    9.1      2023 04:37    TPro  7.3  /  Alb  3.4  /  TBili  0.5  /  DBili  x   /  AST  20  /  ALT  17  /  AlkPhos  119  04-06    PT/INR - ( 2023 11:30 )   PT: 18.6 sec;   INR: 1.60 ratio         PTT - ( 2023 04:50 )  PTT:>200.0 sec  Urinalysis Basic - ( 2023 02:00 )    Color: Yellow / Appearance: Clear / S.020 / pH: x  Gluc: x / Ketone: Negative  / Bili: Negative / Urobili: Negative mg/dL   Blood: x / Protein: Negative / Nitrite: Negative   Leuk Esterase: Trace / RBC: 6-10 /HPF / WBC 0-2 /HPF   Sq Epi: x / Non Sq Epi: x / Bacteria: x      I&O's Summary    2023 07:01  -  2023 07:00  --------------------------------------------------------  IN: 372 mL / OUT: 4500 mL / NET: -4128 mL    2023 07:01  -  2023 10:38  --------------------------------------------------------  IN: 916 mL / OUT: 1175 mL / NET: -259 mL    RADIOLOGY & ADDITIONAL STUDIES: Reviewed     ADVANCE DIRECTIVES/TREATMENT PREFERENCES: FULL CODE     NEUROLOGICAL MEDICATIONS/OPIOIDS/BENZODIAZEPINE IN PAST 24 HOURS  LORazepam   Injectable   0.25 milliGRAM(s) IV Push (23 @ 14:13)    morphine  - Injectable   0.5 milliGRAM(s) IV Push (23 @ 20:33)

## 2023-04-07 NOTE — PROGRESS NOTE ADULT - PROBLEM SELECTOR PLAN 2
New onset w/ variable block which appears to be improving off AVN blocker  Recommend rhythm control if possible  AC: Heparin gtt  Appreciate EP recs -?PPM for tachy-renetta

## 2023-04-07 NOTE — PROGRESS NOTE ADULT - SUBJECTIVE AND OBJECTIVE BOX
[facility] Advanced Heart Failure - Progress Note  [address] [place] [address]  Office Phone: [phone]/Fax: [phone]  Service/On Call Phone [phone]    Subjective/Objective: NAEO. Pt showing some clinical improvement. Currently off BiPAP. Denies CP, SOB at rest.     Medications:  albuterol/ipratropium for Nebulization 3 milliLiter(s) Nebulizer every 6 hours  albuterol/ipratropium for Nebulization 3 milliLiter(s) Nebulizer every 3 hours PRN  dextrose 5%. 1000 milliLiter(s) IV Continuous <Continuous>  dextrose 5%. 1000 milliLiter(s) IV Continuous <Continuous>  dextrose 50% Injectable 25 Gram(s) IV Push once  dextrose 50% Injectable 12.5 Gram(s) IV Push once  dextrose 50% Injectable 25 Gram(s) IV Push once  dextrose Oral Gel 15 Gram(s) Oral once PRN  furosemide   Injectable 80 milliGRAM(s) IV Push every 12 hours  glucagon  Injectable 1 milliGRAM(s) IntraMuscular once  heparin   Injectable 74743 Unit(s) IV Push every 6 hours PRN  heparin   Injectable 5000 Unit(s) IV Push every 6 hours PRN  heparin  Infusion. 2000 Unit(s)/Hr IV Continuous <Continuous>  insulin lispro (ADMELOG) corrective regimen sliding scale   SubCutaneous three times a day before meals  insulin lispro (ADMELOG) corrective regimen sliding scale   SubCutaneous at bedtime  methylPREDNISolone sodium succinate Injectable 40 milliGRAM(s) IV Push every 8 hours  morphine  - Injectable 0.5 milliGRAM(s) IV Push every 6 hours PRN  sacubitril 24 mG/valsartan 26 mG 1 Tablet(s) Oral two times a day    Vital Signs Last 24 Hours  T(C): 37.4 (04-07-23 @ 13:35), Max: 37.4 (04-07-23 @ 13:35)  HR: 95 (04-07-23 @ 12:00) (63 - 97)  BP: 130/65 (04-07-23 @ 12:00) (109/82 - 157/119)  RR: 18 (04-07-23 @ 12:00) (14 - 30)  SpO2: 96% (04-07-23 @ 12:00) (94% - 100%)    I&O's Summary  06 Apr 2023 07:01  -  07 Apr 2023 07:00  --------------------------------------------------------  IN: 372 mL / OUT: 4500 mL / NET: -4128 mL    07 Apr 2023 07:01  -  07 Apr 2023 13:49  --------------------------------------------------------  IN: 916 mL / OUT: 1175 mL / NET: -259 mL    Tele:  Aflutter (mean HR 90, Low 50, high 142)    Physical Exam:  General: sitting up in chair, in no distress.  HEENT: EOMs intact.  Neck: Neck supple. JVP significantly elevated.   Chest: Bibasilar crackles  CV: Normal S1 and S2. No murmurs, rub, or gallops. Warm peripherally.  PV: 2+ pitting edema B/L LE.  Abdomen: Soft, obese   Skin: warm, dry, no rash.  Neurology: Alert and oriented times three. Sensation intact.  Psych: Appropriate affect.    Labs:                        13.3   5.61  )-----------( 95       ( 07 Apr 2023 04:37 )             42.0     04-07    144  |  93<L>  |  29.1<H>  ----------------------------<  166<H>  4.0   |  39.0<H>  |  1.20    Ca    9.1      07 Apr 2023 04:37    TPro  7.3  /  Alb  3.4  /  TBili  0.5  /  DBili  x   /  AST  20  /  ALT  17  /  AlkPhos  119  04-06    PT/INR - ( 06 Apr 2023 11:30 )   PT: 18.6 sec;   INR: 1.60 ratio         PTT - ( 07 Apr 2023 04:50 )  PTT:>200.0 sec

## 2023-04-07 NOTE — PROGRESS NOTE ADULT - PROBLEM SELECTOR PLAN 2
Persistent w/ variable block which has improved off AVN blocking agent  AC: Heparin gtt  Recommend rhythm control if possible  Appreciate EP recs - plan for possible GRZEGORZ/ablation next week

## 2023-04-07 NOTE — CONSULT NOTE ADULT - PROBLEM SELECTOR PROBLEM 1
Acute on chronic diastolic congestive heart failure
Acute on chronic respiratory failure with hypoxia and hypercapnia
Atrial flutter

## 2023-04-07 NOTE — PROCEDURE NOTE - ADDITIONAL PROCEDURE DETAILS
Patient resting comfortably in NAD. Ultrasound utilized. Tourniquet removed and all sharps disposed of at completion of procedure. Patient left with bed rails up and bed lowered to original position.

## 2023-04-08 DIAGNOSIS — G47.33 OBSTRUCTIVE SLEEP APNEA (ADULT) (PEDIATRIC): ICD-10-CM

## 2023-04-08 LAB
ANION GAP SERPL CALC-SCNC: 10 MMOL/L — SIGNIFICANT CHANGE UP (ref 5–17)
ANION GAP SERPL CALC-SCNC: 12 MMOL/L — SIGNIFICANT CHANGE UP (ref 5–17)
APTT BLD: 55.1 SEC — HIGH (ref 27.5–35.5)
APTT BLD: 59.5 SEC — HIGH (ref 27.5–35.5)
APTT BLD: 90.3 SEC — HIGH (ref 27.5–35.5)
BASOPHILS # BLD AUTO: 0 K/UL — SIGNIFICANT CHANGE UP (ref 0–0.2)
BASOPHILS NFR BLD AUTO: 0 % — SIGNIFICANT CHANGE UP (ref 0–2)
BUN SERPL-MCNC: 38.5 MG/DL — HIGH (ref 8–20)
BUN SERPL-MCNC: 45 MG/DL — HIGH (ref 8–20)
CALCIUM SERPL-MCNC: 8.6 MG/DL — SIGNIFICANT CHANGE UP (ref 8.4–10.5)
CALCIUM SERPL-MCNC: 8.8 MG/DL — SIGNIFICANT CHANGE UP (ref 8.4–10.5)
CHLORIDE SERPL-SCNC: 86 MMOL/L — LOW (ref 96–108)
CHLORIDE SERPL-SCNC: 92 MMOL/L — LOW (ref 96–108)
CO2 SERPL-SCNC: 37 MMOL/L — HIGH (ref 22–29)
CO2 SERPL-SCNC: 40 MMOL/L — HIGH (ref 22–29)
CREAT SERPL-MCNC: 1.25 MG/DL — SIGNIFICANT CHANGE UP (ref 0.5–1.3)
CREAT SERPL-MCNC: 1.35 MG/DL — HIGH (ref 0.5–1.3)
EGFR: 52 ML/MIN/1.73M2 — LOW
EGFR: 57 ML/MIN/1.73M2 — LOW
EOSINOPHIL # BLD AUTO: 0 K/UL — SIGNIFICANT CHANGE UP (ref 0–0.5)
EOSINOPHIL NFR BLD AUTO: 0 % — SIGNIFICANT CHANGE UP (ref 0–6)
GLUCOSE BLDC GLUCOMTR-MCNC: 105 MG/DL — HIGH (ref 70–99)
GLUCOSE BLDC GLUCOMTR-MCNC: 129 MG/DL — HIGH (ref 70–99)
GLUCOSE BLDC GLUCOMTR-MCNC: 139 MG/DL — HIGH (ref 70–99)
GLUCOSE BLDC GLUCOMTR-MCNC: 154 MG/DL — HIGH (ref 70–99)
GLUCOSE BLDC GLUCOMTR-MCNC: 169 MG/DL — HIGH (ref 70–99)
GLUCOSE SERPL-MCNC: 147 MG/DL — HIGH (ref 70–99)
GLUCOSE SERPL-MCNC: 156 MG/DL — HIGH (ref 70–99)
HCT VFR BLD CALC: 43.3 % — SIGNIFICANT CHANGE UP (ref 39–50)
HGB BLD-MCNC: 13.9 G/DL — SIGNIFICANT CHANGE UP (ref 13–17)
IMM GRANULOCYTES NFR BLD AUTO: 0.5 % — SIGNIFICANT CHANGE UP (ref 0–0.9)
LYMPHOCYTES # BLD AUTO: 0.59 K/UL — LOW (ref 1–3.3)
LYMPHOCYTES # BLD AUTO: 7.8 % — LOW (ref 13–44)
MAGNESIUM SERPL-MCNC: 2.2 MG/DL — SIGNIFICANT CHANGE UP (ref 1.6–2.6)
MCHC RBC-ENTMCNC: 32.1 GM/DL — SIGNIFICANT CHANGE UP (ref 32–36)
MCHC RBC-ENTMCNC: 33.9 PG — SIGNIFICANT CHANGE UP (ref 27–34)
MCV RBC AUTO: 105.6 FL — HIGH (ref 80–100)
MONOCYTES # BLD AUTO: 0.3 K/UL — SIGNIFICANT CHANGE UP (ref 0–0.9)
MONOCYTES NFR BLD AUTO: 4 % — SIGNIFICANT CHANGE UP (ref 2–14)
NEUTROPHILS # BLD AUTO: 6.63 K/UL — SIGNIFICANT CHANGE UP (ref 1.8–7.4)
NEUTROPHILS NFR BLD AUTO: 87.7 % — HIGH (ref 43–77)
PHOSPHATE SERPL-MCNC: 2.3 MG/DL — LOW (ref 2.4–4.7)
PLATELET # BLD AUTO: 107 K/UL — LOW (ref 150–400)
POTASSIUM SERPL-MCNC: 3.3 MMOL/L — LOW (ref 3.5–5.3)
POTASSIUM SERPL-MCNC: 3.9 MMOL/L — SIGNIFICANT CHANGE UP (ref 3.5–5.3)
POTASSIUM SERPL-SCNC: 3.3 MMOL/L — LOW (ref 3.5–5.3)
POTASSIUM SERPL-SCNC: 3.9 MMOL/L — SIGNIFICANT CHANGE UP (ref 3.5–5.3)
RBC # BLD: 4.1 M/UL — LOW (ref 4.2–5.8)
RBC # FLD: 13.6 % — SIGNIFICANT CHANGE UP (ref 10.3–14.5)
SODIUM SERPL-SCNC: 138 MMOL/L — SIGNIFICANT CHANGE UP (ref 135–145)
SODIUM SERPL-SCNC: 139 MMOL/L — SIGNIFICANT CHANGE UP (ref 135–145)
WBC # BLD: 7.56 K/UL — SIGNIFICANT CHANGE UP (ref 3.8–10.5)
WBC # FLD AUTO: 7.56 K/UL — SIGNIFICANT CHANGE UP (ref 3.8–10.5)

## 2023-04-08 PROCEDURE — 93010 ELECTROCARDIOGRAM REPORT: CPT

## 2023-04-08 PROCEDURE — 99233 SBSQ HOSP IP/OBS HIGH 50: CPT

## 2023-04-08 RX ORDER — LANOLIN ALCOHOL/MO/W.PET/CERES
3 CREAM (GRAM) TOPICAL AT BEDTIME
Refills: 0 | Status: ACTIVE | OUTPATIENT
Start: 2023-04-08 | End: 2024-03-06

## 2023-04-08 RX ORDER — SODIUM CHLORIDE 0.65 %
1 AEROSOL, SPRAY (ML) NASAL THREE TIMES A DAY
Refills: 0 | Status: ACTIVE | OUTPATIENT
Start: 2023-04-08 | End: 2024-03-06

## 2023-04-08 RX ORDER — POTASSIUM CHLORIDE 20 MEQ
40 PACKET (EA) ORAL EVERY 4 HOURS
Refills: 0 | Status: COMPLETED | OUTPATIENT
Start: 2023-04-08 | End: 2023-04-08

## 2023-04-08 RX ADMIN — Medication 1 SPRAY(S): at 21:04

## 2023-04-08 RX ADMIN — Medication 40 MILLIEQUIVALENT(S): at 14:13

## 2023-04-08 RX ADMIN — Medication 80 MILLIGRAM(S): at 05:00

## 2023-04-08 RX ADMIN — Medication 80 MILLIGRAM(S): at 17:36

## 2023-04-08 RX ADMIN — Medication 40 MILLIEQUIVALENT(S): at 17:36

## 2023-04-08 RX ADMIN — Medication 3 MILLILITER(S): at 08:45

## 2023-04-08 RX ADMIN — HEPARIN SODIUM 1100 UNIT(S)/HR: 5000 INJECTION INTRAVENOUS; SUBCUTANEOUS at 14:08

## 2023-04-08 RX ADMIN — HEPARIN SODIUM 800 UNIT(S)/HR: 5000 INJECTION INTRAVENOUS; SUBCUTANEOUS at 00:01

## 2023-04-08 RX ADMIN — Medication 1: at 12:21

## 2023-04-08 RX ADMIN — HEPARIN SODIUM 800 UNIT(S)/HR: 5000 INJECTION INTRAVENOUS; SUBCUTANEOUS at 06:34

## 2023-04-08 RX ADMIN — Medication 40 MILLIGRAM(S): at 05:02

## 2023-04-08 RX ADMIN — Medication 3 MILLILITER(S): at 04:31

## 2023-04-08 RX ADMIN — Medication 1 SPRAY(S): at 14:13

## 2023-04-08 RX ADMIN — HEPARIN SODIUM 5000 UNIT(S): 5000 INJECTION INTRAVENOUS; SUBCUTANEOUS at 14:11

## 2023-04-08 RX ADMIN — SACUBITRIL AND VALSARTAN 1 TABLET(S): 24; 26 TABLET, FILM COATED ORAL at 17:36

## 2023-04-08 RX ADMIN — HEPARIN SODIUM 1100 UNIT(S)/HR: 5000 INJECTION INTRAVENOUS; SUBCUTANEOUS at 22:53

## 2023-04-08 RX ADMIN — Medication 3 MILLILITER(S): at 20:50

## 2023-04-08 RX ADMIN — SACUBITRIL AND VALSARTAN 1 TABLET(S): 24; 26 TABLET, FILM COATED ORAL at 05:06

## 2023-04-08 RX ADMIN — Medication 40 MILLIGRAM(S): at 17:38

## 2023-04-08 RX ADMIN — Medication 3 MILLIGRAM(S): at 21:04

## 2023-04-08 NOTE — PROGRESS NOTE ADULT - ASSESSMENT
Acute on chronic hypoxemic, hypercapnic resp failure; over-compensated with alkalemia  COPD, OHS /CHALO /HFpEF   Severe PH with Cor pulmonale  Better on Bipap, but remains sig hypercapnic, changed to AVAPs- repeat ABG  Eventual wean to nocturnal only- will need to verify from home care company type of machine he has- family aware  On NCO2 daytime  OOB to chair, eating lunch  Feels better    Rec:    Continue nebs  On nocturnal Bipap  NCO2 daytime and titrate as needed  Brief IV steroids  Gentle diuresis as tolerate; ? Diamox if HCO3 and pH remain elevated  On full AC  Follow echo for pulm HTN  OOB/ambulate as able  Prognosis guarded  Pulm f/u with Dr. Martinez as outpt

## 2023-04-08 NOTE — PROGRESS NOTE ADULT - ASSESSMENT
83 year old male with PMH HTN, T2DM, HFpEF, COPD on 2LNC, DVT on Apixaban presented with worsening dyspnea and pedal edema.  EKG with Atrial flutter with slow VR and Chest X-Ray with congestion. BNP 1652    Acute on Chronic respiratory failure with hypoxia and hypercapnia   Multifactorial: Acute on chronic CHF and COPD exacerbation, CHALO/OHS, Severe PH with Cor pulmonale   ECHO 4/5/23: LVEF 55-60%, severe RA and RV enlargement   - c/w telemetry   - I/O, Daily weight, fluid and salt restriction   - c/w Furosemide 80 mg IV q12   - completed Metolazone x 3 doses   - Per Cardio, Losartan switched to Entresto    - Consider SLGT2i   - Appreciate Cardio and Heart Failure Consult   - c/w IV Solu-medrol, reduced dose today   - c/w DuoNebs   - c/w BiPAP (pt did not tolerate AVAPS)   - Appreciate Pulm consult   - Pall care consult appreciated     Atrial Flutter with slow ventricular response   - DC all AVN blockers   - Holding Apixaban, switched to Heparin gtt   - GRZEGORZ + Atrial Flutter ablation next week per EP   - No indication for pacemaker at this time   - Appreciate EP consult     HTN   - Entresto   - Monitor BP     T2DM   - Carbohydrate consistent diet   - Hold home meds   - BGM with ISS   - A1c 5.9     Traumatic ulcers in oral cavity   - Pt reports BiPAP was placed on him while he had his dentures in which likely caused the ulcers   - bleeding has stopped currently, will monitor   - oral care     VTE prophylaxis - Heparin gtt     Incentive Spirometry, OOB     Guarded Prognosis   Discussed with daughter at bedside

## 2023-04-08 NOTE — PROGRESS NOTE ADULT - ASSESSMENT
82 y/o male with chronic HFpEF ACC/AHA stage C, recurrent CHF hospitalizations, COPD on home O2 (4 LPM via NC) since July 2022, former smoker (stopped 7/2022), DM2, DVT on Eliquis admitted with dyspnea, edema, weight gain and new atrial flutter.   1. Acute on chronic HFpEF, elevated BNP, trop neg x2, no evidence of ACS,   2. newly diagnosed Aflutter with variable block (VR 30-40s) s/p EP evaluation - Dr. Woodward  - no indication for ppm at this time. bradycardia was likely due to a combination of hypoxia and stacking of AVN blockers in setting of AK, We will plan for GRZEGORZ + flutter ablation early next week provided he is closer to euvolemic. Previous EKG from 2017 @ [facility] revealed atrial flutter  3. COPD exacerbation, hypercapnea, (04/06/2023), pH 7.62 HCO3 52.4 Reported, today BMP HCO2 - 39  4. s/p TTE 4/5/23: LVEF 55-60%, LVIDd 5.47cm, severe RA and RV enlargement, trace MR, moderate TR, PASP 61.8 mmHg (RA 15 mmHg)      Plan:  Heart Failure consult appreciated.   Continue to avoid AV node blocking agents.   EP consultation appreciated.  GRZEGORZ + flutter ablation early next week provided he is closer to euvolemic.  Continue IV lasix 80 mg q 12 hours, Will lower to 40 IV q 12 hours on sunday  Continue  entresto BID, hold for SBP <110    Eliquis held, patient is on bridge IV heparin.

## 2023-04-08 NOTE — PROGRESS NOTE ADULT - SUBJECTIVE AND OBJECTIVE BOX
[facility] Division of Hospital Medicine    Chief Complaint:  resp failure     SUBJECTIVE / OVERNIGHT EVENTS:  Pt more awake this AM   reports bleeding from mouth     Patient denies chest pain, abd pain, N/V, fever, chills, dysuria or any other complaints. All remainder ROS negative.    MEDICATIONS  (STANDING):  albuterol/ipratropium for Nebulization 3 milliLiter(s) Nebulizer every 6 hours  dextrose 5%. 1000 milliLiter(s) (50 mL/Hr) IV Continuous <Continuous>  dextrose 5%. 1000 milliLiter(s) (100 mL/Hr) IV Continuous <Continuous>  dextrose 50% Injectable 25 Gram(s) IV Push once  dextrose 50% Injectable 12.5 Gram(s) IV Push once  dextrose 50% Injectable 25 Gram(s) IV Push once  furosemide   Injectable 80 milliGRAM(s) IV Push every 12 hours  glucagon  Injectable 1 milliGRAM(s) IntraMuscular once  heparin  Infusion. 2000 Unit(s)/Hr (20 mL/Hr) IV Continuous <Continuous>  insulin lispro (ADMELOG) corrective regimen sliding scale   SubCutaneous three times a day before meals  insulin lispro (ADMELOG) corrective regimen sliding scale   SubCutaneous at bedtime  methylPREDNISolone sodium succinate Injectable 40 milliGRAM(s) IV Push every 8 hours  sacubitril 24 mG/valsartan 26 mG 1 Tablet(s) Oral two times a day  sodium chloride 0.65% Nasal 1 Spray(s) Both Nostrils three times a day    MEDICATIONS  (PRN):  albuterol/ipratropium for Nebulization 3 milliLiter(s) Nebulizer every 3 hours PRN Shortness of Breath and/or Wheezing  dextrose Oral Gel 15 Gram(s) Oral once PRN Blood Glucose LESS THAN 70 milliGRAM(s)/deciliter  heparin   Injectable 23295 Unit(s) IV Push every 6 hours PRN For aPTT less than 40  heparin   Injectable 5000 Unit(s) IV Push every 6 hours PRN For aPTT between 40 - 57  morphine  - Injectable 0.5 milliGRAM(s) IV Push every 6 hours PRN increased work of breathing        I&O's Summary    07 Apr 2023 07:01  -  08 Apr 2023 07:00  --------------------------------------------------------  IN: 2608 mL / OUT: 3425 mL / NET: -817 mL    08 Apr 2023 07:01  -  08 Apr 2023 12:29  --------------------------------------------------------  IN: 374 mL / OUT: 1350 mL / NET: -976 mL        PHYSICAL EXAM:  Vital Signs Last 24 Hrs  T(C): 36.9 (08 Apr 2023 08:00), Max: 37.4 (07 Apr 2023 13:35)  T(F): 98.4 (08 Apr 2023 08:00), Max: 99.3 (07 Apr 2023 13:35)  HR: 93 (08 Apr 2023 10:00) (67 - 98)  BP: 133/60 (08 Apr 2023 10:00) (106/60 - 145/52)  BP(mean): 81 (08 Apr 2023 10:00) (73 - 88)  RR: 18 (08 Apr 2023 10:00) (15 - 22)  SpO2: 96% (08 Apr 2023 10:00) (93% - 98%)    Parameters below as of 08 Apr 2023 10:00  Patient On (Oxygen Delivery Method): nasal cannula  O2 Flow (L/min): 3        CONSTITUTIONAL: NAD, sitting up in bed  ENMT: Moist oral mucosa, blood noticed on the upper palate with traumatic ulcers    RESPIRATORY: Fair respiratory effort; lungs with coarse BS, reduced at bases bilaterally  CARDIOVASCULAR: Regular rate and rhythm, normal S1 and S2, ++ lower extremity edema  ABDOMEN: Obese, Nontender to palpation, normoactive bowel sounds  MUSCULOSKELETAL:  No clubbing or cyanosis of digits   PSYCH: A+O to person, place not time; affect appropriate  NEUROLOGY: No gross sensory or motor deficits   SKIN: bleeding at IV site RUE, dressing in place       LABS:                        13.9   7.56  )-----------( 107      ( 08 Apr 2023 05:54 )             43.3     04-08    138  |  86<L>  |  38.5<H>  ----------------------------<  156<H>  3.3<L>   |  40.0<H>  |  1.25    Ca    8.8      08 Apr 2023 05:54      PTT - ( 08 Apr 2023 05:54 )  PTT:59.5 sec          CAPILLARY BLOOD GLUCOSE      POCT Blood Glucose.: 154 mg/dL (08 Apr 2023 12:13)  POCT Blood Glucose.: 169 mg/dL (08 Apr 2023 09:51)  POCT Blood Glucose.: 129 mg/dL (08 Apr 2023 08:47)  POCT Blood Glucose.: 149 mg/dL (07 Apr 2023 21:10)  POCT Blood Glucose.: 119 mg/dL (07 Apr 2023 17:09)  POCT Blood Glucose.: 189 mg/dL (07 Apr 2023 12:35)

## 2023-04-08 NOTE — PROGRESS NOTE ADULT - SUBJECTIVE AND OBJECTIVE BOX
Patient sitting in chair in NAD, was able to walk with assistnce from rn      TELEMETRY: af    MEDICATIONS  (STANDING):  albuterol/ipratropium for Nebulization 3 milliLiter(s) Nebulizer every 6 hours  dextrose 5%. 1000 milliLiter(s) (50 mL/Hr) IV Continuous <Continuous>  dextrose 5%. 1000 milliLiter(s) (100 mL/Hr) IV Continuous <Continuous>  dextrose 50% Injectable 25 Gram(s) IV Push once  dextrose 50% Injectable 12.5 Gram(s) IV Push once  dextrose 50% Injectable 25 Gram(s) IV Push once  furosemide   Injectable 80 milliGRAM(s) IV Push every 12 hours  glucagon  Injectable 1 milliGRAM(s) IntraMuscular once  heparin  Infusion. 2000 Unit(s)/Hr (20 mL/Hr) IV Continuous <Continuous>  insulin lispro (ADMELOG) corrective regimen sliding scale   SubCutaneous three times a day before meals  insulin lispro (ADMELOG) corrective regimen sliding scale   SubCutaneous at bedtime  methylPREDNISolone sodium succinate Injectable 40 milliGRAM(s) IV Push every 12 hours  potassium chloride    Tablet ER 40 milliEquivalent(s) Oral every 4 hours  sacubitril 24 mG/valsartan 26 mG 1 Tablet(s) Oral two times a day  sodium chloride 0.65% Nasal 1 Spray(s) Both Nostrils three times a day    MEDICATIONS  (PRN):  albuterol/ipratropium for Nebulization 3 milliLiter(s) Nebulizer every 3 hours PRN Shortness of Breath and/or Wheezing  dextrose Oral Gel 15 Gram(s) Oral once PRN Blood Glucose LESS THAN 70 milliGRAM(s)/deciliter  heparin   Injectable 68809 Unit(s) IV Push every 6 hours PRN For aPTT less than 40  heparin   Injectable 5000 Unit(s) IV Push every 6 hours PRN For aPTT between 40 - 57  morphine  - Injectable 0.5 milliGRAM(s) IV Push every 6 hours PRN increased work of breathing        Vital Signs Last 24 Hrs  T(C): 36.9 (08 Apr 2023 08:00), Max: 37.3 (07 Apr 2023 15:30)  T(F): 98.4 (08 Apr 2023 08:00), Max: 99.1 (07 Apr 2023 15:30)  HR: 99 (08 Apr 2023 12:00) (67 - 99)  BP: 126/87 (08 Apr 2023 12:00) (106/60 - 145/52)  BP(mean): 95 (08 Apr 2023 12:00) (73 - 95)  RR: 19 (08 Apr 2023 12:00) (15 - 22)  SpO2: 98% (08 Apr 2023 12:00) (93% - 98%)    Parameters below as of 08 Apr 2023 12:00  Patient On (Oxygen Delivery Method): nasal cannula  O2 Flow (L/min): 3      Daily     Daily     I&O's Detail    07 Apr 2023 07:01  -  08 Apr 2023 07:00  --------------------------------------------------------  IN:    Heparin Infusion: 108 mL    Oral Fluid: 2500 mL  Total IN: 2608 mL    OUT:    Voided (mL): 3425 mL  Total OUT: 3425 mL    Total NET: -817 mL      08 Apr 2023 07:01  -  08 Apr 2023 14:07  --------------------------------------------------------  IN:    Heparin Infusion: 48 mL    Oral Fluid: 350 mL  Total IN: 398 mL    OUT:    Voided (mL): 1550 mL  Total OUT: 1550 mL    Total NET: -1152 mL          PHYSICAL EXAM:  Appearance: In NAD  Cardiovascular: Normal S1 S2  Respiratory: Scatt coarse BS and scatt wheezing  Gastrointestinal:  Soft, Non-tender, + BS, no bruits	  Neurologic: Grossly non-focal.  Extremities: + edema, decreased    LABS:                        13.9   7.56  )-----------( 107      ( 08 Apr 2023 05:54 )             43.3     04-08    138  |  86<L>  |  38.5<H>  ----------------------------<  156<H>  3.3<L>   |  40.0<H>  |  1.25    Ca    8.8      08 Apr 2023 05:54          PTT - ( 08 Apr 2023 12:30 )  PTT:55.1 sec    I&O's Summary    07 Apr 2023 07:01  -  08 Apr 2023 07:00  --------------------------------------------------------  IN: 2608 mL / OUT: 3425 mL / NET: -817 mL    08 Apr 2023 07:01  -  08 Apr 2023 14:08  --------------------------------------------------------  IN: 398 mL / OUT: 1550 mL / NET: -1152 mL

## 2023-04-08 NOTE — PROGRESS NOTE ADULT - SUBJECTIVE AND OBJECTIVE BOX
PULMONARY PROGRESS NOTE      LAINE GUZMAN  MRN-[id number]    Patient is a 83y old  Male who presents with a chief complaint of Ankle swelling and hard time breathing (08 Apr 2023 12:28)      INTERVAL HPI/OVERNIGHT EVENTS:    Pt is awake and alert  Feels better; less SOB    MEDICATIONS  (STANDING):  albuterol/ipratropium for Nebulization 3 milliLiter(s) Nebulizer every 6 hours  dextrose 5%. 1000 milliLiter(s) (50 mL/Hr) IV Continuous <Continuous>  dextrose 5%. 1000 milliLiter(s) (100 mL/Hr) IV Continuous <Continuous>  dextrose 50% Injectable 25 Gram(s) IV Push once  dextrose 50% Injectable 12.5 Gram(s) IV Push once  dextrose 50% Injectable 25 Gram(s) IV Push once  furosemide   Injectable 80 milliGRAM(s) IV Push every 12 hours  glucagon  Injectable 1 milliGRAM(s) IntraMuscular once  heparin  Infusion. 2000 Unit(s)/Hr (20 mL/Hr) IV Continuous <Continuous>  insulin lispro (ADMELOG) corrective regimen sliding scale   SubCutaneous three times a day before meals  insulin lispro (ADMELOG) corrective regimen sliding scale   SubCutaneous at bedtime  methylPREDNISolone sodium succinate Injectable 40 milliGRAM(s) IV Push every 12 hours  potassium chloride    Tablet ER 40 milliEquivalent(s) Oral every 4 hours  sacubitril 24 mG/valsartan 26 mG 1 Tablet(s) Oral two times a day  sodium chloride 0.65% Nasal 1 Spray(s) Both Nostrils three times a day      MEDICATIONS  (PRN):  albuterol/ipratropium for Nebulization 3 milliLiter(s) Nebulizer every 3 hours PRN Shortness of Breath and/or Wheezing  dextrose Oral Gel 15 Gram(s) Oral once PRN Blood Glucose LESS THAN 70 milliGRAM(s)/deciliter  heparin   Injectable 44790 Unit(s) IV Push every 6 hours PRN For aPTT less than 40  heparin   Injectable 5000 Unit(s) IV Push every 6 hours PRN For aPTT between 40 - 57  morphine  - Injectable 0.5 milliGRAM(s) IV Push every 6 hours PRN increased work of breathing      Allergies    No Known Allergies    Intolerances        PAST MEDICAL & SURGICAL HISTORY:  Congestive heart failure (CHF)      COPD (chronic obstructive pulmonary disease)      DM (diabetes mellitus)      HTN (hypertension)      BPH (benign prostatic hyperplasia)      S/P cholecystectomy      History of appendectomy            REVIEW OF SYSTEMS: Offers no new complaints; overall better    Vital Signs Last 24 Hrs  T(C): 36.9 (08 Apr 2023 08:00), Max: 37.4 (07 Apr 2023 13:35)  T(F): 98.4 (08 Apr 2023 08:00), Max: 99.3 (07 Apr 2023 13:35)  HR: 99 (08 Apr 2023 12:00) (67 - 99)  BP: 126/87 (08 Apr 2023 12:00) (106/60 - 145/52)  BP(mean): 95 (08 Apr 2023 12:00) (73 - 95)  RR: 19 (08 Apr 2023 12:00) (15 - 22)  SpO2: 98% (08 Apr 2023 12:00) (93% - 98%)    Parameters below as of 08 Apr 2023 12:00  Patient On (Oxygen Delivery Method): nasal cannula  O2 Flow (L/min): 3      PHYSICAL EXAMINATION:    GENERAL: The patient is awake and alert in no apparent distress. Obese    HEENT: Head is normocephalic and atraumatic. Extraocular muscles are intact. Mucous membranes are moist.    NECK: Supple.    LUNGS: Bibasilar rales, no wheeze or rhonchi; respirations unlabored    HEART: Regular rate and rhythm without murmur.    ABDOMEN: Soft, nontender, and nondistended.      EXTREMITIES: Without any cyanosis, but b/l edema.    NEUROLOGIC: Grossly intact.    LABS:                        13.9   7.56  )-----------( 107      ( 08 Apr 2023 05:54 )             43.3     04-08    138  |  86<L>  |  38.5<H>  ----------------------------<  156<H>  3.3<L>   |  40.0<H>  |  1.25    Ca    8.8      08 Apr 2023 05:54      PTT - ( 08 Apr 2023 12:30 )  PTT:55.1 sec    ABG - ( 07 Apr 2023 11:24 )  pH, Arterial: 7.470 pH, Blood: x     /  pCO2: 69    /  pO2: 69    / HCO3: 50    / Base Excess: 26.5  /  SaO2: 96.2          MICROBIOLOGY:    COVID-19 PCR . (04.05.23 @ 12:38)    COVID-19 PCR: NotDetec: You can help in the fight against COVID-19. [facility] may contact  you to see if you are interested in voluntarily participating in one of  our clinical trials.  Testing is performed using polymerase chain reaction (PCR) or  transcription mediated amplification (TMA). This COVID-19 (SARS-CoV-2)  nucleic acid amplification test was validated by [facility] and is  in use under the FDA Emergency Use Authorization (EUA) for clinical labs  CLIA-certified to perform high complexity testing. Test results should be  correlated with clinical presentation, patient history, and epidemiology.        RADIOLOGY & ADDITIONAL STUDIES:      ACC: [id number] EXAM:  XR CHEST PORTABLE URGENT 1V   ORDERED BY: VILMA PRINCE     PROCEDURE DATE:  04/06/2023          INTERPRETATION:  TECHNIQUE: Single portable view of the chest.    COMPARISON:  4/5/2023    CLINICAL HISTORY: crackles on exam    FINDINGS:    Single frontal view of the chest demonstrates mild to moderate congestive   changes. Bibasilar atelectasis. The cardiomediastinal silhouette is   enlarged. No acute osseous abnormalities. Overlying EKG leads and wires   are noted    IMPRESSION: No interval change.        PEDRO ISABEL MD; Attending Radiologist  This document has been electronically signed. Apr 6 2023 11:24AM          ECHO:    Summary:   1. Technically difficult study.   2. Endocardial visualization was enhanced with intravenous echo contrast.   3. Normal global left ventricular systolic function.   4. Left ventricular ejection fraction, by visual estimation, is 55 to   60%.   5. The mitral in-flow pattern reveals no discernable A-wave, therefore   no comment on diastolic function can be made.   6. Normal left atrial size.   7. Severely enlarged right atrium.   8. Severely enlarged right ventricle.   9. Dilatation of the aortic root.  10. Sclerotic aortic valve with normal opening.  11. Mild thickening of the anterior and posterior mitral valve leaflets.  12. Trace mitral valve regurgitation.  13. Moderate tricuspid regurgitation.  14. Estimated pulmonary artery systolic pressure is 61.8 mmHg assuming a   right atrialpressure of 15 mmHg, which is consistent with severe   pulmonary hypertension.  15. There is no evidence of pericardial effusion.    Ewelina Sainz MD Electronically signed on 4/6/2023 at 8:04:19 AM

## 2023-04-09 LAB
ANION GAP SERPL CALC-SCNC: 8 MMOL/L — SIGNIFICANT CHANGE UP (ref 5–17)
APTT BLD: 81.4 SEC — HIGH (ref 27.5–35.5)
BASOPHILS # BLD AUTO: 0.01 K/UL — SIGNIFICANT CHANGE UP (ref 0–0.2)
BASOPHILS NFR BLD AUTO: 0.1 % — SIGNIFICANT CHANGE UP (ref 0–2)
BUN SERPL-MCNC: 43 MG/DL — HIGH (ref 8–20)
CALCIUM SERPL-MCNC: 9 MG/DL — SIGNIFICANT CHANGE UP (ref 8.4–10.5)
CHLORIDE SERPL-SCNC: 92 MMOL/L — LOW (ref 96–108)
CO2 SERPL-SCNC: 39 MMOL/L — HIGH (ref 22–29)
CREAT SERPL-MCNC: 1.05 MG/DL — SIGNIFICANT CHANGE UP (ref 0.5–1.3)
EGFR: 70 ML/MIN/1.73M2 — SIGNIFICANT CHANGE UP
EOSINOPHIL # BLD AUTO: 0.01 K/UL — SIGNIFICANT CHANGE UP (ref 0–0.5)
EOSINOPHIL NFR BLD AUTO: 0.1 % — SIGNIFICANT CHANGE UP (ref 0–6)
GLUCOSE BLDC GLUCOMTR-MCNC: 119 MG/DL — HIGH (ref 70–99)
GLUCOSE BLDC GLUCOMTR-MCNC: 130 MG/DL — HIGH (ref 70–99)
GLUCOSE BLDC GLUCOMTR-MCNC: 133 MG/DL — HIGH (ref 70–99)
GLUCOSE BLDC GLUCOMTR-MCNC: 139 MG/DL — HIGH (ref 70–99)
GLUCOSE SERPL-MCNC: 134 MG/DL — HIGH (ref 70–99)
HCT VFR BLD CALC: 47.5 % — SIGNIFICANT CHANGE UP (ref 39–50)
HGB BLD-MCNC: 15 G/DL — SIGNIFICANT CHANGE UP (ref 13–17)
IMM GRANULOCYTES NFR BLD AUTO: 0.5 % — SIGNIFICANT CHANGE UP (ref 0–0.9)
LYMPHOCYTES # BLD AUTO: 0.61 K/UL — LOW (ref 1–3.3)
LYMPHOCYTES # BLD AUTO: 7.3 % — LOW (ref 13–44)
MCHC RBC-ENTMCNC: 31.6 GM/DL — LOW (ref 32–36)
MCHC RBC-ENTMCNC: 33.2 PG — SIGNIFICANT CHANGE UP (ref 27–34)
MCV RBC AUTO: 105.1 FL — HIGH (ref 80–100)
MONOCYTES # BLD AUTO: 0.38 K/UL — SIGNIFICANT CHANGE UP (ref 0–0.9)
MONOCYTES NFR BLD AUTO: 4.5 % — SIGNIFICANT CHANGE UP (ref 2–14)
NEUTROPHILS # BLD AUTO: 7.34 K/UL — SIGNIFICANT CHANGE UP (ref 1.8–7.4)
NEUTROPHILS NFR BLD AUTO: 87.5 % — HIGH (ref 43–77)
PLATELET # BLD AUTO: 88 K/UL — LOW (ref 150–400)
POTASSIUM SERPL-MCNC: 4.4 MMOL/L — SIGNIFICANT CHANGE UP (ref 3.5–5.3)
POTASSIUM SERPL-SCNC: 4.4 MMOL/L — SIGNIFICANT CHANGE UP (ref 3.5–5.3)
RBC # BLD: 4.52 M/UL — SIGNIFICANT CHANGE UP (ref 4.2–5.8)
RBC # FLD: 13.7 % — SIGNIFICANT CHANGE UP (ref 10.3–14.5)
SODIUM SERPL-SCNC: 139 MMOL/L — SIGNIFICANT CHANGE UP (ref 135–145)
WBC # BLD: 8.39 K/UL — SIGNIFICANT CHANGE UP (ref 3.8–10.5)
WBC # FLD AUTO: 8.39 K/UL — SIGNIFICANT CHANGE UP (ref 3.8–10.5)

## 2023-04-09 PROCEDURE — 99232 SBSQ HOSP IP/OBS MODERATE 35: CPT

## 2023-04-09 PROCEDURE — 99233 SBSQ HOSP IP/OBS HIGH 50: CPT

## 2023-04-09 RX ORDER — FUROSEMIDE 40 MG
20 TABLET ORAL
Refills: 0 | Status: COMPLETED | OUTPATIENT
Start: 2023-04-09 | End: 2023-04-10

## 2023-04-09 RX ORDER — SENNA PLUS 8.6 MG/1
2 TABLET ORAL AT BEDTIME
Refills: 0 | Status: ACTIVE | OUTPATIENT
Start: 2023-04-09 | End: 2024-03-07

## 2023-04-09 RX ADMIN — SACUBITRIL AND VALSARTAN 1 TABLET(S): 24; 26 TABLET, FILM COATED ORAL at 17:54

## 2023-04-09 RX ADMIN — HEPARIN SODIUM 1100 UNIT(S)/HR: 5000 INJECTION INTRAVENOUS; SUBCUTANEOUS at 19:22

## 2023-04-09 RX ADMIN — Medication 1 SPRAY(S): at 21:54

## 2023-04-09 RX ADMIN — Medication 1 SPRAY(S): at 05:24

## 2023-04-09 RX ADMIN — Medication 3 MILLILITER(S): at 21:26

## 2023-04-09 RX ADMIN — Medication 3 MILLILITER(S): at 08:56

## 2023-04-09 RX ADMIN — Medication 3 MILLILITER(S): at 15:03

## 2023-04-09 RX ADMIN — Medication 40 MILLIGRAM(S): at 17:53

## 2023-04-09 RX ADMIN — Medication 40 MILLIGRAM(S): at 05:24

## 2023-04-09 RX ADMIN — Medication 1 SPRAY(S): at 14:47

## 2023-04-09 RX ADMIN — HEPARIN SODIUM 1100 UNIT(S)/HR: 5000 INJECTION INTRAVENOUS; SUBCUTANEOUS at 21:53

## 2023-04-09 RX ADMIN — Medication 80 MILLIGRAM(S): at 05:23

## 2023-04-09 RX ADMIN — HEPARIN SODIUM 1100 UNIT(S)/HR: 5000 INJECTION INTRAVENOUS; SUBCUTANEOUS at 06:15

## 2023-04-09 RX ADMIN — SENNA PLUS 2 TABLET(S): 8.6 TABLET ORAL at 21:53

## 2023-04-09 RX ADMIN — SACUBITRIL AND VALSARTAN 1 TABLET(S): 24; 26 TABLET, FILM COATED ORAL at 05:24

## 2023-04-09 RX ADMIN — Medication 20 MILLIGRAM(S): at 14:47

## 2023-04-09 RX ADMIN — Medication 3 MILLILITER(S): at 05:10

## 2023-04-09 NOTE — PROGRESS NOTE ADULT - SUBJECTIVE AND OBJECTIVE BOX
PULMONARY PROGRESS NOTE      LAINE GUZMAN  [id number]    Patient is a 83y old  Male who presents with a chief complaint of Ankle swelling and hard time breathing (09 Apr 2023 11:18)      INTERVAL HPI/OVERNIGHT EVENTS:    Pt is awake and alert  Sitting in a chair  Comfortable    MEDICATIONS  (STANDING):  albuterol/ipratropium for Nebulization 3 milliLiter(s) Nebulizer every 6 hours  dextrose 5%. 1000 milliLiter(s) (50 mL/Hr) IV Continuous <Continuous>  dextrose 5%. 1000 milliLiter(s) (100 mL/Hr) IV Continuous <Continuous>  dextrose 50% Injectable 25 Gram(s) IV Push once  dextrose 50% Injectable 12.5 Gram(s) IV Push once  dextrose 50% Injectable 25 Gram(s) IV Push once  furosemide   Injectable 20 milliGRAM(s) IV Push two times a day  glucagon  Injectable 1 milliGRAM(s) IntraMuscular once  heparin  Infusion. 2000 Unit(s)/Hr (20 mL/Hr) IV Continuous <Continuous>  insulin lispro (ADMELOG) corrective regimen sliding scale   SubCutaneous three times a day before meals  insulin lispro (ADMELOG) corrective regimen sliding scale   SubCutaneous at bedtime  methylPREDNISolone sodium succinate Injectable 40 milliGRAM(s) IV Push every 12 hours  sacubitril 24 mG/valsartan 26 mG 1 Tablet(s) Oral two times a day  sodium chloride 0.65% Nasal 1 Spray(s) Both Nostrils three times a day      MEDICATIONS  (PRN):  albuterol/ipratropium for Nebulization 3 milliLiter(s) Nebulizer every 3 hours PRN Shortness of Breath and/or Wheezing  dextrose Oral Gel 15 Gram(s) Oral once PRN Blood Glucose LESS THAN 70 milliGRAM(s)/deciliter  heparin   Injectable 48419 Unit(s) IV Push every 6 hours PRN For aPTT less than 40  heparin   Injectable 5000 Unit(s) IV Push every 6 hours PRN For aPTT between 40 - 57  melatonin 3 milliGRAM(s) Oral at bedtime PRN Insomnia  morphine  - Injectable 0.5 milliGRAM(s) IV Push every 6 hours PRN increased work of breathing      Allergies    No Known Allergies    Intolerances        PAST MEDICAL & SURGICAL HISTORY:  Congestive heart failure (CHF)      COPD (chronic obstructive pulmonary disease)      DM (diabetes mellitus)      HTN (hypertension)      BPH (benign prostatic hyperplasia)      S/P cholecystectomy      History of appendectomy            REVIEW OF SYSTEMS: No new complaints    Vital Signs Last 24 Hrs  T(C): 36.6 (09 Apr 2023 08:01), Max: 37.2 (08 Apr 2023 20:00)  T(F): 97.8 (09 Apr 2023 08:01), Max: 98.9 (08 Apr 2023 20:00)  HR: 104 (09 Apr 2023 10:00) (85 - 104)  BP: 97/44 (09 Apr 2023 10:00) (97/44 - 138/74)  BP(mean): 57 (09 Apr 2023 10:00) (57 - 104)  RR: 22 (09 Apr 2023 10:00) (12 - 22)  SpO2: 96% (09 Apr 2023 10:00) (92% - 99%)    Parameters below as of 09 Apr 2023 10:00  Patient On (Oxygen Delivery Method): nasal cannula  O2 Flow (L/min): 3      PHYSICAL EXAMINATION:    GENERAL: The patient is awake and alert in no apparent distress. Obese    HEENT: Head is normocephalic and atraumatic. Extraocular muscles are intact. Mucous membranes are moist.    NECK: Supple.    LUNGS: Clear to auscultation without wheezing, rales or rhonchi; respirations unlabored    HEART: Regular rate and rhythm without murmur.    ABDOMEN: Soft, nontender, and nondistended.      EXTREMITIES: Without any cyanosis, with edema.    NEUROLOGIC: Grossly intact.    LABS:                        15.0   8.39  )-----------( 88       ( 09 Apr 2023 05:10 )             47.5     04-09    139  |  92<L>  |  43.0<H>  ----------------------------<  134<H>  4.4   |  39.0<H>  |  1.05    Ca    9.0      09 Apr 2023 05:10  Phos  2.3     04-08  Mg     2.2     04-08      PTT - ( 09 Apr 2023 05:10 )  PTT:81.4 sec        MICROBIOLOGY:    COVID-19 PCR . (04.05.23 @ 12:38)    COVID-19 PCR: NotDetec: You can help in the fight against COVID-19. [facility] may contact  you to see if you are interested in voluntarily participating in one of  our clinical trials.  Testing is performed using polymerase chain reaction (PCR) or  transcription mediated amplification (TMA). This COVID-19 (SARS-CoV-2)  nucleic acid amplification test was validated by [facility] and is  in use under the FDA Emergency Use Authorization (EUA) for clinical labs  CLIA-certified to perform high complexity testing. Test results should be  correlated with clinical presentation, patient history, and epidemiology.        RADIOLOGY & ADDITIONAL STUDIES:    ACC: [id number] EXAM:  XR CHEST PORTABLE URGENT 1V   ORDERED BY: VILMA PRINCE     PROCEDURE DATE:  04/06/2023          INTERPRETATION:  TECHNIQUE: Single portable view of the chest.    COMPARISON:  4/5/2023    CLINICAL HISTORY: crackles on exam    FINDINGS:    Single frontal view of the chest demonstrates mild to moderate congestive   changes. Bibasilar atelectasis. The cardiomediastinal silhouette is   enlarged. No acute osseous abnormalities. Overlying EKG leads and wires   are noted    IMPRESSION: No interval change.        PEDRO ISABEL MD; Attending Radiologist  This document has been electronically signed. Apr 6 2023 11:24AM      ECHO:    Summary:   1. Technically difficult study.   2. Endocardial visualization was enhanced with intravenous echo contrast.   3. Normal global left ventricular systolic function.   4. Left ventricular ejection fraction, by visual estimation, is 55 to   60%.   5. The mitral in-flow pattern reveals no discernable A-wave, therefore   no comment on diastolic function can be made.   6. Normal left atrial size.   7. Severely enlarged right atrium.   8. Severely enlarged right ventricle.   9. Dilatation of the aortic root.  10. Sclerotic aortic valve with normal opening.  11. Mild thickening of the anterior and posterior mitral valve leaflets.  12. Trace mitral valve regurgitation.  13. Moderate tricuspid regurgitation.  14. Estimated pulmonary artery systolic pressure is 61.8 mmHg assuming a   right atrialpressure of 15 mmHg, which is consistent with severe   pulmonary hypertension.  15. There is no evidence of pericardial effusion.    Ewelina Sainz MD Electronically signed on 4/6/2023 at 8:04:19 AM

## 2023-04-09 NOTE — PROGRESS NOTE ADULT - SUBJECTIVE AND OBJECTIVE BOX
[facility]                                                               CARDIAC ELECTROPHYSIOLOGY                                                       [facility] [place]                                                      Office: [address] [address]                                                       Telephone: [phone]. Fax:[phone]    Subjective: Feels well, breathing improving.    TELE: AFL rate controlled, rare tachycardia.    MEDICATIONS  (STANDING):  albuterol/ipratropium for Nebulization 3 milliLiter(s) Nebulizer every 6 hours  dextrose 5%. 1000 milliLiter(s) (50 mL/Hr) IV Continuous <Continuous>  dextrose 5%. 1000 milliLiter(s) (100 mL/Hr) IV Continuous <Continuous>  dextrose 50% Injectable 25 Gram(s) IV Push once  dextrose 50% Injectable 12.5 Gram(s) IV Push once  dextrose 50% Injectable 25 Gram(s) IV Push once  furosemide   Injectable 20 milliGRAM(s) IV Push two times a day  glucagon  Injectable 1 milliGRAM(s) IntraMuscular once  heparin  Infusion. 2000 Unit(s)/Hr (20 mL/Hr) IV Continuous <Continuous>  insulin lispro (ADMELOG) corrective regimen sliding scale   SubCutaneous three times a day before meals  insulin lispro (ADMELOG) corrective regimen sliding scale   SubCutaneous at bedtime  methylPREDNISolone sodium succinate Injectable 40 milliGRAM(s) IV Push every 12 hours  sacubitril 24 mG/valsartan 26 mG 1 Tablet(s) Oral two times a day  sodium chloride 0.65% Nasal 1 Spray(s) Both Nostrils three times a day    MEDICATIONS  (PRN):  albuterol/ipratropium for Nebulization 3 milliLiter(s) Nebulizer every 3 hours PRN Shortness of Breath and/or Wheezing  dextrose Oral Gel 15 Gram(s) Oral once PRN Blood Glucose LESS THAN 70 milliGRAM(s)/deciliter  heparin   Injectable 91668 Unit(s) IV Push every 6 hours PRN For aPTT less than 40  heparin   Injectable 5000 Unit(s) IV Push every 6 hours PRN For aPTT between 40 - 57  melatonin 3 milliGRAM(s) Oral at bedtime PRN Insomnia  morphine  - Injectable 0.5 milliGRAM(s) IV Push every 6 hours PRN increased work of breathing    Allergies  No Known Allergies    Vital Signs Last 24 Hrs  T(C): 36.7 (09 Apr 2023 12:03), Max: 37.2 (08 Apr 2023 20:00)  T(F): 98.1 (09 Apr 2023 12:03), Max: 98.9 (08 Apr 2023 20:00)  HR: 104 (09 Apr 2023 10:00) (85 - 104)  BP: 97/44 (09 Apr 2023 10:00) (97/44 - 138/74)  BP(mean): 57 (09 Apr 2023 10:00) (57 - 104)  RR: 22 (09 Apr 2023 10:00) (12 - 22)  SpO2: 96% (09 Apr 2023 10:00) (92% - 99%)    Parameters below as of 09 Apr 2023 10:00  Patient On (Oxygen Delivery Method): nasal cannula  O2 Flow (L/min): 3    Physical Exam:  Constitutional: Well-developed, well nourished, in no acute distress, obese  Head: normocephalic atraumatic  Eyes:  extraocular movements intact, sclera anicteric  ENT: mucous membranes moist, pharynx no erythema or swelling  Chest wall: normal in appearance, nontender to palpation  Resp: effort normal, bibasilar rales, no rhonchi/wheezes  Cardiac: Heart regular rate and rhythm, no murmurs, rubs, or gallops. Trace bilateral edema.  Abdomen: soft, nondistended, bowel sounds active, nontender to palpation in all four quadrants    Musculoskeletal: full range of motion all extremities with no pain, tenderness, swelling, or erythema    Neuro: Alert and oriented x 3, motor & sensation grossly in tact  Skin: color normal, no rashes or injury  Psych: mood calm    LABS:                        15.0   8.39  )-----------( 88       ( 09 Apr 2023 05:10 )             47.5     04-09    139  |  92<L>  |  43.0<H>  ----------------------------<  134<H>  4.4   |  39.0<H>  |  1.05    Ca    9.0      09 Apr 2023 05:10  Phos  2.3     04-08  Mg     2.2     04-08    PTT - ( 09 Apr 2023 05:10 )  PTT:81.4 sec

## 2023-04-09 NOTE — PROGRESS NOTE ADULT - ASSESSMENT
83 year old male with PMH HTN, T2DM, HFpEF, COPD on 2LNC, DVT on Apixaban presented with worsening dyspnea and pedal edema.  EKG with Atrial flutter with slow VR and Chest X-Ray with congestion. BNP 1652    Acute on Chronic respiratory failure with hypoxia and hypercapnia   Multifactorial: Acute on chronic CHF and COPD exacerbation, CHALO/OHS, Severe PH with Cor pulmonale   ECHO 4/5/23: LVEF 55-60%, severe RA and RV enlargement   - c/w telemetry   - I/O, Daily weight, fluid and salt restriction   - c/w Furosemide 80 mg IV q12   - completed Metolazone x 3 doses   - Per Cardio, Losartan switched to Entresto    - Consider SLGT2i   - Appreciate Cardio and Heart Failure Consult   - c/w IV Solu-medrol, taper dose 4/10   - c/w DuoNebs   - c/w BiPAP (pt did not tolerate AVAPS)   - Appreciate Pulm consult   - Pall care consult appreciated     Atrial Flutter with slow ventricular response   - DC all AVN blockers   - Holding Apixaban, switched to Heparin gtt   - GRZEGORZ + Atrial Flutter ablation - tuesday     HTN   - Entresto   - Monitor BP     T2DM   - BGM with ISS   - A1c 5.9     Traumatic ulcers in oral cavity   - Pt reports BiPAP was placed on him while he had his dentures in which likely caused the ulcers   - bleeding has stopped currently, will monitor   - oral care     VTE prophylaxis - Heparin gtt     Incentive Spirometry, OOB     Guarded Prognosis   Discussed with daughter at bedside

## 2023-04-09 NOTE — PROGRESS NOTE ADULT - ASSESSMENT
83 year old male former smoker (stopped 7/2022) with HTN, DM 2, HFpEF (LVEF: 55-60%), mod-severe pulmonary hypertension, BPH, COPD (on 4LNC since 7/2022), and DVT (on Apixaban) who presents with recurrent acute on chronic HFpEF and AFL with slow ventricular response for which EP is consulted.    #Typical AFL:  - GRZEGORZ + ablation, possibly Tuesday (today has volume overload, needs additional diuresis)  - Continue heparin gtt    #Bradycardia:  - Now resolved, no indication for pacemaker  - Avoid AVN blockers    #CHF:  - Needs additional diuresis    #COPD:  - Appreciate pulmonary consultation  - Continue nebulizers    Shane Woodward MD  Clinical Cardiac Electrophysiology

## 2023-04-09 NOTE — PROGRESS NOTE ADULT - ASSESSMENT
Acute on chronic hypoxemic, hypercapnic resp failure; over-compensated with alkalemia  COPD, OHS /CHALO /HFpEF   Severe PH with Cor pulmonale  Better on Bipap, but remains sig hypercapnic, changed to AVAPs - repeat ABG  Eventual wean to nocturnal only- will need to verify from home care company type of machine he has- family aware  On NCO2 daytime  OOB to chair, eating lunch  Feels better    Rec:    Continue nebs  On nocturnal Bipap/AVAPS  NCO2 daytime and titrate as needed  Brief IV steroids  Gentle diuresis as tolerate; ? Diamox if HCO3 and pH remain elevated  Optimize cardiac status  On full AC  Follow CXR for improvement; repeat in AM  F/u ABG off NIV in AM  Follow echo for pulm HTN  OOB/ambulate as able  Prognosis guarded  Pulm f/u with Dr. Martinez as outpt    d/w family at bedside

## 2023-04-09 NOTE — PROGRESS NOTE ADULT - SUBJECTIVE AND OBJECTIVE BOX
S: Patient denies chest pain or dyspnea.     TELEMETRY: afib/flutter    MEDICATIONS  (STANDING):  albuterol/ipratropium for Nebulization 3 milliLiter(s) Nebulizer every 6 hours  dextrose 5%. 1000 milliLiter(s) (50 mL/Hr) IV Continuous <Continuous>  dextrose 5%. 1000 milliLiter(s) (100 mL/Hr) IV Continuous <Continuous>  dextrose 50% Injectable 25 Gram(s) IV Push once  dextrose 50% Injectable 12.5 Gram(s) IV Push once  dextrose 50% Injectable 25 Gram(s) IV Push once  furosemide   Injectable 80 milliGRAM(s) IV Push every 12 hours  glucagon  Injectable 1 milliGRAM(s) IntraMuscular once  heparin  Infusion. 2000 Unit(s)/Hr (20 mL/Hr) IV Continuous <Continuous>  insulin lispro (ADMELOG) corrective regimen sliding scale   SubCutaneous three times a day before meals  insulin lispro (ADMELOG) corrective regimen sliding scale   SubCutaneous at bedtime  methylPREDNISolone sodium succinate Injectable 40 milliGRAM(s) IV Push every 12 hours  sacubitril 24 mG/valsartan 26 mG 1 Tablet(s) Oral two times a day  sodium chloride 0.65% Nasal 1 Spray(s) Both Nostrils three times a day    MEDICATIONS  (PRN):  albuterol/ipratropium for Nebulization 3 milliLiter(s) Nebulizer every 3 hours PRN Shortness of Breath and/or Wheezing  dextrose Oral Gel 15 Gram(s) Oral once PRN Blood Glucose LESS THAN 70 milliGRAM(s)/deciliter  heparin   Injectable 57197 Unit(s) IV Push every 6 hours PRN For aPTT less than 40  heparin   Injectable 5000 Unit(s) IV Push every 6 hours PRN For aPTT between 40 - 57  melatonin 3 milliGRAM(s) Oral at bedtime PRN Insomnia  morphine  - Injectable 0.5 milliGRAM(s) IV Push every 6 hours PRN increased work of breathing        Vital Signs Last 24 Hrs  T(C): 36.6 (09 Apr 2023 08:01), Max: 37.2 (08 Apr 2023 20:00)  T(F): 97.8 (09 Apr 2023 08:01), Max: 98.9 (08 Apr 2023 20:00)  HR: 102 (09 Apr 2023 08:56) (85 - 104)  BP: 124/94 (09 Apr 2023 08:01) (109/58 - 138/74)  BP(mean): 104 (09 Apr 2023 08:01) (75 - 104)  RR: 22 (09 Apr 2023 08:01) (12 - 22)  SpO2: 99% (09 Apr 2023 08:56) (92% - 99%)    Parameters below as of 09 Apr 2023 08:56  Patient On (Oxygen Delivery Method): nasal cannula,3L        Daily     Daily     I&O's Detail    08 Apr 2023 07:01  -  09 Apr 2023 07:00  --------------------------------------------------------  IN:    Heparin Infusion: 224 mL    Oral Fluid: 1150 mL  Total IN: 1374 mL    OUT:    Voided (mL): 3150 mL  Total OUT: 3150 mL    Total NET: -1776 mL      09 Apr 2023 07:01  -  09 Apr 2023 11:18  --------------------------------------------------------  IN:    Heparin Infusion: 11 mL  Total IN: 11 mL    OUT:  Total OUT: 0 mL    Total NET: 11 mL          PHYSICAL EXAM:  Appearance: In NAD  Neck: No JVD,   Cardiovascular: Normal S1 S2  Respiratory: Lungs clear to auscultation	  Gastrointestinal:  Soft, Non-tender, + BS, no bruits	  Neurologic: Grossly non-focal.  Extremities: No edema    LABS:                        15.0   8.39  )-----------( 88       ( 09 Apr 2023 05:10 )             47.5     04-09    139  |  92<L>  |  43.0<H>  ----------------------------<  134<H>  4.4   |  39.0<H>  |  1.05    Ca    9.0      09 Apr 2023 05:10  Phos  2.3     04-08  Mg     2.2     04-08          PTT - ( 09 Apr 2023 05:10 )  PTT:81.4 sec    I&O's Summary    08 Apr 2023 07:01  -  09 Apr 2023 07:00  --------------------------------------------------------  IN: 1374 mL / OUT: 3150 mL / NET: -1776 mL    09 Apr 2023 07:01  -  09 Apr 2023 11:18  --------------------------------------------------------  IN: 11 mL / OUT: 0 mL / NET: 11 mL

## 2023-04-09 NOTE — PROGRESS NOTE ADULT - SUBJECTIVE AND OBJECTIVE BOX
[facility] Division of Hospital Medicine    Chief Complaint:  resp failure     SUBJECTIVE / OVERNIGHT EVENTS:  feels better, swelling improving     Patient denies chest pain, abd pain, N/V, fever, chills, dysuria or any other complaints. All remainder ROS negative.            PHYSICAL EXAM:  Vital Signs Last 24 Hrs  T(C): 36.7 (09 Apr 2023 12:03), Max: 37.2 (08 Apr 2023 20:00)  T(F): 98.1 (09 Apr 2023 12:03), Max: 98.9 (08 Apr 2023 20:00)  HR: 103 (09 Apr 2023 12:00) (85 - 104)  BP: 105/58 (09 Apr 2023 12:00) (97/44 - 138/74)  BP(mean): 71 (09 Apr 2023 12:00) (57 - 104)  RR: 22 (09 Apr 2023 12:00) (12 - 22)  SpO2: 97% (09 Apr 2023 12:00) (92% - 99%)    Parameters below as of 09 Apr 2023 12:00  Patient On (Oxygen Delivery Method): nasal cannula  O2 Flow (L/min): 3        CONSTITUTIONAL: NAD, sitting up in bed  ENMT: Moist oral mucosa, blood noticed on the upper palate with traumatic ulcers    RESPIRATORY: Fair respiratory effort; lungs with coarse BS, reduced at bases bilaterally  CARDIOVASCULAR: Regular rate and rhythm, normal S1 and S2, ++ lower extremity edema  ABDOMEN: Obese, Nontender to palpation, normoactive bowel sounds  MUSCULOSKELETAL:  No clubbing or cyanosis of digits   PSYCH: A+O to person, place not time; affect appropriate  NEUROLOGY: No gross sensory or motor deficits         LABS:                          15.0   8.39  )-----------( 88       ( 09 Apr 2023 05:10 )             47.5   04-09    139  |  92<L>  |  43.0<H>  ----------------------------<  134<H>  4.4   |  39.0<H>  |  1.05    Ca    9.0      09 Apr 2023 05:10  Phos  2.3     04-08  Mg     2.2     04-08        MEDICATIONS  (STANDING):  albuterol/ipratropium for Nebulization 3 milliLiter(s) Nebulizer every 6 hours  dextrose 5%. 1000 milliLiter(s) (50 mL/Hr) IV Continuous <Continuous>  dextrose 5%. 1000 milliLiter(s) (100 mL/Hr) IV Continuous <Continuous>  dextrose 50% Injectable 25 Gram(s) IV Push once  dextrose 50% Injectable 12.5 Gram(s) IV Push once  dextrose 50% Injectable 25 Gram(s) IV Push once  furosemide   Injectable 20 milliGRAM(s) IV Push two times a day  glucagon  Injectable 1 milliGRAM(s) IntraMuscular once  heparin  Infusion. 2000 Unit(s)/Hr (20 mL/Hr) IV Continuous <Continuous>  insulin lispro (ADMELOG) corrective regimen sliding scale   SubCutaneous three times a day before meals  insulin lispro (ADMELOG) corrective regimen sliding scale   SubCutaneous at bedtime  methylPREDNISolone sodium succinate Injectable 40 milliGRAM(s) IV Push every 12 hours  sacubitril 24 mG/valsartan 26 mG 1 Tablet(s) Oral two times a day  sodium chloride 0.65% Nasal 1 Spray(s) Both Nostrils three times a day    MEDICATIONS  (PRN):  albuterol/ipratropium for Nebulization 3 milliLiter(s) Nebulizer every 3 hours PRN Shortness of Breath and/or Wheezing  dextrose Oral Gel 15 Gram(s) Oral once PRN Blood Glucose LESS THAN 70 milliGRAM(s)/deciliter  heparin   Injectable 41264 Unit(s) IV Push every 6 hours PRN For aPTT less than 40  heparin   Injectable 5000 Unit(s) IV Push every 6 hours PRN For aPTT between 40 - 57  melatonin 3 milliGRAM(s) Oral at bedtime PRN Insomnia  morphine  - Injectable 0.5 milliGRAM(s) IV Push every 6 hours PRN increased work of breathing

## 2023-04-09 NOTE — PROGRESS NOTE ADULT - ASSESSMENT
84 y/o male with chronic HFpEF ACC/AHA stage C, recurrent CHF hospitalizations, COPD on home O2 (4 LPM via NC) since July 2022, former smoker (stopped 7/2022), DM2, DVT on Eliquis admitted with dyspnea, edema, weight gain and new atrial flutter.   1. Acute on chronic HFpEF, elevated BNP, trop neg x2, no evidence of ACS,   2. newly diagnosed Aflutter with variable block (VR 30-40s) s/p EP evaluation - Dr. Woodward  - no indication for ppm at this time. bradycardia was likely due to a combination of hypoxia and stacking of AVN blockers in setting of AK, We will plan for GRZEGORZ + flutter ablation early next week provided he is closer to euvolemic. Previous EKG from 2017 @ [facility] revealed atrial flutter  3. COPD exacerbation, hypercapnea, (04/06/2023), pH 7.62 HCO3 52.4 Reported, today BMP HCO2 - 39  4. s/p TTE 4/5/23: LVEF 55-60%, LVIDd 5.47cm, severe RA and RV enlargement, trace MR, moderate TR, PASP 61.8 mmHg (RA 15 mmHg)      Plan:  Heart Failure consult appreciated.   Continue to avoid AV node blocking agents.   EP consultation appreciated.  GRZEGORZ + flutter ablation early next week provided he is closer to euvolemic.  Changed to lower dose of lasix 40 IV q 12 hours for two more doses, than change to po lasix.   Continue  entresto BID, hold for SBP <110    Eliquis held, patient is on bridge IV heparin.

## 2023-04-10 LAB
ANION GAP SERPL CALC-SCNC: 9 MMOL/L — SIGNIFICANT CHANGE UP (ref 5–17)
APTT BLD: 86.9 SEC — HIGH (ref 27.5–35.5)
BASE EXCESS BLDA CALC-SCNC: 22.9 MMOL/L — HIGH (ref -2–3)
BLOOD GAS COMMENTS ARTERIAL: SIGNIFICANT CHANGE UP
BUN SERPL-MCNC: 39.6 MG/DL — HIGH (ref 8–20)
CALCIUM SERPL-MCNC: 8.8 MG/DL — SIGNIFICANT CHANGE UP (ref 8.4–10.5)
CHLORIDE SERPL-SCNC: 90 MMOL/L — LOW (ref 96–108)
CO2 SERPL-SCNC: 37 MMOL/L — HIGH (ref 22–29)
CREAT SERPL-MCNC: 0.91 MG/DL — SIGNIFICANT CHANGE UP (ref 0.5–1.3)
EGFR: 84 ML/MIN/1.73M2 — SIGNIFICANT CHANGE UP
GAS PNL BLDA: SIGNIFICANT CHANGE UP
GLUCOSE BLDC GLUCOMTR-MCNC: 118 MG/DL — HIGH (ref 70–99)
GLUCOSE BLDC GLUCOMTR-MCNC: 127 MG/DL — HIGH (ref 70–99)
GLUCOSE BLDC GLUCOMTR-MCNC: 135 MG/DL — HIGH (ref 70–99)
GLUCOSE BLDC GLUCOMTR-MCNC: 170 MG/DL — HIGH (ref 70–99)
GLUCOSE SERPL-MCNC: 129 MG/DL — HIGH (ref 70–99)
HCO3 BLDA-SCNC: 47 MMOL/L — CRITICAL HIGH (ref 21–28)
HCT VFR BLD CALC: 46.5 % — SIGNIFICANT CHANGE UP (ref 39–50)
HGB BLD-MCNC: 14.9 G/DL — SIGNIFICANT CHANGE UP (ref 13–17)
HOROWITZ INDEX BLDA+IHG-RTO: 3 — SIGNIFICANT CHANGE UP
MAGNESIUM SERPL-MCNC: 2.4 MG/DL — SIGNIFICANT CHANGE UP (ref 1.6–2.6)
MCHC RBC-ENTMCNC: 32 GM/DL — SIGNIFICANT CHANGE UP (ref 32–36)
MCHC RBC-ENTMCNC: 33.8 PG — SIGNIFICANT CHANGE UP (ref 27–34)
MCV RBC AUTO: 105.4 FL — HIGH (ref 80–100)
PCO2 BLDA: 64 MMHG — HIGH (ref 35–48)
PH BLDA: 7.47 — HIGH (ref 7.35–7.45)
PLATELET # BLD AUTO: 72 K/UL — LOW (ref 150–400)
PO2 BLDA: 84 MMHG — SIGNIFICANT CHANGE UP (ref 83–108)
POTASSIUM SERPL-MCNC: 3.9 MMOL/L — SIGNIFICANT CHANGE UP (ref 3.5–5.3)
POTASSIUM SERPL-SCNC: 3.9 MMOL/L — SIGNIFICANT CHANGE UP (ref 3.5–5.3)
RBC # BLD: 4.41 M/UL — SIGNIFICANT CHANGE UP (ref 4.2–5.8)
RBC # FLD: 13.5 % — SIGNIFICANT CHANGE UP (ref 10.3–14.5)
SAO2 % BLDA: 98 % — SIGNIFICANT CHANGE UP (ref 94–98)
SODIUM SERPL-SCNC: 136 MMOL/L — SIGNIFICANT CHANGE UP (ref 135–145)
WBC # BLD: 6.12 K/UL — SIGNIFICANT CHANGE UP (ref 3.8–10.5)
WBC # FLD AUTO: 6.12 K/UL — SIGNIFICANT CHANGE UP (ref 3.8–10.5)

## 2023-04-10 PROCEDURE — 99233 SBSQ HOSP IP/OBS HIGH 50: CPT

## 2023-04-10 PROCEDURE — 71045 X-RAY EXAM CHEST 1 VIEW: CPT | Mod: 26

## 2023-04-10 PROCEDURE — 99232 SBSQ HOSP IP/OBS MODERATE 35: CPT

## 2023-04-10 RX ORDER — POLYETHYLENE GLYCOL 3350 17 G/17G
17 POWDER, FOR SOLUTION ORAL DAILY
Refills: 0 | Status: ACTIVE | OUTPATIENT
Start: 2023-04-10 | End: 2024-03-08

## 2023-04-10 RX ORDER — SPIRONOLACTONE 25 MG/1
25 TABLET, FILM COATED ORAL DAILY
Refills: 0 | Status: ACTIVE | OUTPATIENT
Start: 2023-04-10 | End: 2024-03-08

## 2023-04-10 RX ADMIN — Medication 1: at 12:38

## 2023-04-10 RX ADMIN — SENNA PLUS 2 TABLET(S): 8.6 TABLET ORAL at 21:28

## 2023-04-10 RX ADMIN — Medication 40 MILLIGRAM(S): at 17:42

## 2023-04-10 RX ADMIN — HEPARIN SODIUM 1100 UNIT(S)/HR: 5000 INJECTION INTRAVENOUS; SUBCUTANEOUS at 17:45

## 2023-04-10 RX ADMIN — SACUBITRIL AND VALSARTAN 1 TABLET(S): 24; 26 TABLET, FILM COATED ORAL at 17:41

## 2023-04-10 RX ADMIN — HEPARIN SODIUM 1100 UNIT(S)/HR: 5000 INJECTION INTRAVENOUS; SUBCUTANEOUS at 19:30

## 2023-04-10 RX ADMIN — Medication 3 MILLILITER(S): at 09:01

## 2023-04-10 RX ADMIN — SACUBITRIL AND VALSARTAN 1 TABLET(S): 24; 26 TABLET, FILM COATED ORAL at 06:15

## 2023-04-10 RX ADMIN — POLYETHYLENE GLYCOL 3350 17 GRAM(S): 17 POWDER, FOR SOLUTION ORAL at 11:41

## 2023-04-10 RX ADMIN — Medication 3 MILLILITER(S): at 04:22

## 2023-04-10 RX ADMIN — Medication 1 SPRAY(S): at 21:28

## 2023-04-10 RX ADMIN — Medication 40 MILLIGRAM(S): at 06:14

## 2023-04-10 RX ADMIN — HEPARIN SODIUM 1100 UNIT(S)/HR: 5000 INJECTION INTRAVENOUS; SUBCUTANEOUS at 07:26

## 2023-04-10 RX ADMIN — Medication 10 MILLIGRAM(S): at 11:41

## 2023-04-10 RX ADMIN — Medication 3 MILLILITER(S): at 21:06

## 2023-04-10 RX ADMIN — Medication 1 SPRAY(S): at 14:52

## 2023-04-10 RX ADMIN — Medication 20 MILLIGRAM(S): at 06:14

## 2023-04-10 RX ADMIN — Medication 1 SPRAY(S): at 06:14

## 2023-04-10 RX ADMIN — Medication 3 MILLILITER(S): at 15:25

## 2023-04-10 NOTE — PROGRESS NOTE ADULT - ASSESSMENT
Primary Cardiology: [facility]  HF Cardiologist: Dr. Carreon    84 y/o male, former smoker (quit 7/2022), with h/o chronic HFpEF ACC/AHA stage C, COPD on home O2 (4 LPM via NC) since July 2022, DM2, DVT on Eliquis who was referred from Dr. Booker's office due to ADHF.   Upon arrival to the ED he was found to have newly diagnosed Aflutter with variable block (VR 30-40s). His BP was stable and he denied symptoms associated to bradycardia. His labs were remarkable for elevated pBNP, normal trop, thrombocytopenia and elevated CO2. His CxR showed pulmonary congestion. He was given IV lasix, glucagon and calcium. His AV node blocker agents were held.   This is his 3rd or 4th hospitalization in the past 12 months. He has been admitted multiple times to [facility].     Now off continuous BiPAP and appears close to euvolemic on exam. His renal function has improved and hes normotensive.    Cardiac Studies:  TTE 4/5/23: LVEF 55-60%, LVIDd 5.47cm, severe RA and RV enlargement, trace MR, moderate TR, PASP 61.8 mmHg (RA 15 mmHg).

## 2023-04-10 NOTE — PROGRESS NOTE ADULT - PROBLEM SELECTOR PLAN 2
Persistent w/ variable block which has improved off AVN blocking agent  AC: Heparin gtt  Appreciate EP recs - plan for possible GRZEGORZ/ablation tomorrow Persistent w/ variable block which has improved off AVN blocking agent  AC: Heparin gtt  Appreciate EP recs - plan for possible GRZEGORZ/ablation sometime this week, optimized from HF standpoint

## 2023-04-10 NOTE — PROGRESS NOTE ADULT - SUBJECTIVE AND OBJECTIVE BOX
EP follow up atrial flutter     No overnight events.   Pulmonary, General Cardiology & Heart Failure team follow ups appreciated.    TELE: atrial flutter 90-100bpm, up to 150bpm with coughing episodes    MEDICATIONS  (STANDING):  albuterol/ipratropium for Nebulization 3 milliLiter(s) Nebulizer every 6 hours  dextrose 5%. 1000 milliLiter(s) (50 mL/Hr) IV Continuous <Continuous>  dextrose 5%. 1000 milliLiter(s) (100 mL/Hr) IV Continuous <Continuous>  dextrose 50% Injectable 25 Gram(s) IV Push once  dextrose 50% Injectable 12.5 Gram(s) IV Push once  dextrose 50% Injectable 25 Gram(s) IV Push once  glucagon  Injectable 1 milliGRAM(s) IntraMuscular once  heparin  Infusion. 2000 Unit(s)/Hr (20 mL/Hr) IV Continuous <Continuous>  insulin lispro (ADMELOG) corrective regimen sliding scale   SubCutaneous three times a day before meals  insulin lispro (ADMELOG) corrective regimen sliding scale   SubCutaneous at bedtime  methylPREDNISolone sodium succinate Injectable 40 milliGRAM(s) IV Push every 12 hours  sacubitril 24 mG/valsartan 26 mG 1 Tablet(s) Oral two times a day  senna 2 Tablet(s) Oral at bedtime  sodium chloride 0.65% Nasal 1 Spray(s) Both Nostrils three times a day    MEDICATIONS  (PRN):  albuterol/ipratropium for Nebulization 3 milliLiter(s) Nebulizer every 3 hours PRN Shortness of Breath and/or Wheezing  dextrose Oral Gel 15 Gram(s) Oral once PRN Blood Glucose LESS THAN 70 milliGRAM(s)/deciliter  heparin   Injectable 40766 Unit(s) IV Push every 6 hours PRN For aPTT less than 40  heparin   Injectable 5000 Unit(s) IV Push every 6 hours PRN For aPTT between 40 - 57  melatonin 3 milliGRAM(s) Oral at bedtime PRN Insomnia  morphine  - Injectable 0.5 milliGRAM(s) IV Push every 6 hours PRN increased work of breathing    Vital Signs Last 24 Hrs  T(C): 36.4 (10 Apr 2023 08:00), Max: 36.9 (09 Apr 2023 15:50)  T(F): 97.6 (10 Apr 2023 08:00), Max: 98.4 (09 Apr 2023 15:50)  HR: 99 (10 Apr 2023 10:00) (74 - 103)  BP: 113/50 (10 Apr 2023 10:00) (98/66 - 123/57)  BP(mean): 68 (10 Apr 2023 10:00) (67 - 83)  RR: 22 (10 Apr 2023 10:00) (16 - 26)  SpO2: 99% (10 Apr 2023 10:00) (96% - 99%)    Parameters below as of 10 Apr 2023 10:00  Patient On (Oxygen Delivery Method): nasal cannula  O2 Flow (L/min): 3      Physical Exam:  Constitutional: NAD, AAOx3, obese,  Cardiovascular: +S1S2 irregular rate and rhythm   Pulmonary: CTA b/l unlabored  GI: soft NTND +BS  Extremities: +1 pitting edema b/l, +distal pulses b/l, +b/l venous stasis   Neuro: non focal, GALVAN x4    LABS:                        14.9   6.12  )-----------( 72       ( 10 Apr 2023 05:02 )             46.5     04-10    136  |  90<L>  |  39.6<H>  ----------------------------<  129<H>  3.9   |  37.0<H>  |  0.91    Ca    8.8      10 Apr 2023 05:02  Phos  2.3     04-08  Mg     2.4     04-10      PTT - ( 10 Apr 2023 05:02 )  PTT:86.9 sec    Thyroid Stimulating Hormone, Serum in AM (04.07.23 @ 04:37)    Thyroid Stimulating Hormone, Serum: 0.62 uIU/mL      RADIOLOGY & ADDITIONAL TESTS:  04/06/23 CXR: Moderate congestive changes. Bibasilar atelectasis.  04/05/23 TTE: LVEF: 55-60%. Normal LA (LAd 4.6 cm, ARACELI 18.2 mL/m2). RV severely enlarged. Severely enlarged RA. Moderate TR (RVSP: 62 mmHg; severe pHTN). Aortic root dilation.      A/P: 83 year old male former smoker (stopped 7/2022) with HTN, DM 2, HFpEF (LVEF: 55-60%), mod-severe pulmonary hypertension, BPH, COPD (on 4LNC since 7/2022), and DVT (on Apixaban) who presents with recurrent acute on chronic HFpEF and AFL with slow ventricular response for which EP was consulted. Presenting slow VR improved after AV theresa blocking agents held. Heart rates are now  and up to 150 with coughing or ambulation.    His presenting bradycardia was likely due to a combination of hypoxia and stacking of AVN blockers in setting of BRANDEN. Now that AVN blockers have been held and his hypoxia has resolved, his rates are significantly improved. He even accelerates into the 140s now when ambulating. Given improvement in rates, I do not think there is any indication for pacemaker at this time. Review of ECGs from Dr. Booker's office reveal only atrial flutter. I think he would benefit with restoration of normal rhythm with catheter ablation of atrial flutter since he has had paroxysmal atrial flutter in the past and this is likely contributing to his CHF exacerbations. It could also obviate the need for AVN blockers which can worsen his bradycardia.    We will plan for GRZEGORZ & atrial flutter ablation vs DCCV this week, pending his pulmonary and heart failure status      Recommendations:  - GRZEGORZ + Atrial Flutter ablation vs DCCV this week, pending his pulmonary and heart failure status    - continue heparin gtt, plan to transition back to [facility]   - No indication for pacemaker  - Continue with CHF treatment  - Consider Toprol 25mg daily for moderate ventricular rates if ok with pulm. Pt was on Toprol 100 on admission (discontinued for slow VR)    will d/w Dr Mendoza, full recommendations to follow

## 2023-04-10 NOTE — PROGRESS NOTE ADULT - SUBJECTIVE AND OBJECTIVE BOX
[facility] PALLIATIVE MEDICINE     CC: FOLLOW UP VISIT + GOC    INTERVAL HPI/OVERNIGHT EVENTS:  Source if other than patient:  []Family   [x]Team     Seen and examined earlier today. OOB to chair.   Pt reports having oral ulcers over the weekend that has made it difficult to eat at times. He also stated wearing bipap overnight for ~3 hours.     PRESENT SYMPTOMS:     Dyspnea: No  Nausea/Vomiting:  No  Anxiety:   No  Depression:  No  Fatigue:  No  Loss of appetite:  No  Constipation:  Yes by patient, none documented BM in EMR    Pain: No            Character-            Duration-            Effect-            Factors-            Frequency-            Location-            Severity-    Pain AD Score:  [email or web address] (press ctrl + left click to view)    Review of Systems: Reviewed                     Negative: no chest pain or dyspnea at rest                     Positive: see above  All others negative    MEDICATIONS  (STANDING):  albuterol/ipratropium for Nebulization 3 milliLiter(s) Nebulizer every 6 hours  dextrose 5%. 1000 milliLiter(s) (50 mL/Hr) IV Continuous <Continuous>  dextrose 5%. 1000 milliLiter(s) (100 mL/Hr) IV Continuous <Continuous>  dextrose 50% Injectable 25 Gram(s) IV Push once  dextrose 50% Injectable 12.5 Gram(s) IV Push once  dextrose 50% Injectable 25 Gram(s) IV Push once  glucagon  Injectable 1 milliGRAM(s) IntraMuscular once  heparin  Infusion. 2000 Unit(s)/Hr (20 mL/Hr) IV Continuous <Continuous>  insulin lispro (ADMELOG) corrective regimen sliding scale   SubCutaneous three times a day before meals  insulin lispro (ADMELOG) corrective regimen sliding scale   SubCutaneous at bedtime  methylPREDNISolone sodium succinate Injectable 40 milliGRAM(s) IV Push every 12 hours  polyethylene glycol 3350 17 Gram(s) Oral daily  sacubitril 24 mG/valsartan 26 mG 1 Tablet(s) Oral two times a day  senna 2 Tablet(s) Oral at bedtime  sodium chloride 0.65% Nasal 1 Spray(s) Both Nostrils three times a day    MEDICATIONS  (PRN):  albuterol/ipratropium for Nebulization 3 milliLiter(s) Nebulizer every 3 hours PRN Shortness of Breath and/or Wheezing  bisacodyl Suppository 10 milliGRAM(s) Rectal daily PRN Constipation  dextrose Oral Gel 15 Gram(s) Oral once PRN Blood Glucose LESS THAN 70 milliGRAM(s)/deciliter  heparin   Injectable 97197 Unit(s) IV Push every 6 hours PRN For aPTT less than 40  heparin   Injectable 5000 Unit(s) IV Push every 6 hours PRN For aPTT between 40 - 57  melatonin 3 milliGRAM(s) Oral at bedtime PRN Insomnia  morphine  - Injectable 0.5 milliGRAM(s) IV Push every 6 hours PRN increased work of breathing    PHYSICAL EXAM:    Vital Signs Last 24 Hrs  T(C): 36.4 (10 Apr 2023 08:00), Max: 36.9 (09 Apr 2023 15:50)  T(F): 97.6 (10 Apr 2023 08:00), Max: 98.4 (09 Apr 2023 15:50)  HR: 99 (10 Apr 2023 10:00) (74 - 103)  BP: 113/50 (10 Apr 2023 10:00) (98/66 - 123/57)  BP(mean): 68 (10 Apr 2023 10:00) (67 - 83)  RR: 22 (10 Apr 2023 10:00) (16 - 26)  SpO2: 99% (10 Apr 2023 10:00) (96% - 99%)    Parameters below as of 10 Apr 2023 10:00  Patient On (Oxygen Delivery Method): nasal cannula  O2 Flow (L/min): 3    Karnofsky:  40%    GEN: chronically ill. resting comfortably and no acute distress    HEENT: mmm    Lungs: comfortable, nonlabored     CV: +s1/s2 RRR    GI: +BS, abdomen obese, nontender, nondistended      MSK: moves all 4 extremities, no cyanosis. + edema  +weakness    NEURO: nonfocal. awake and alert, oriented x 4, interactive    Skin: warm and dry.   +chronic venous stasis changes on BLE    LABS:                        14.9   6.12  )-----------( 72       ( 10 Apr 2023 05:02 )             46.5       04-10    136  |  90<L>  |  39.6<H>  ----------------------------<  129<H>  3.9   |  37.0<H>  |  0.91    Ca    8.8      10 Apr 2023 05:02  Phos  2.3     04-08  Mg     2.4     04-10       RADIOLOGY & ADDITIONAL STUDIES: Reviewed     ADVANCE DIRECTIVES/TREATMENT PREFERENCES: FULL CODE     NEUROLOGICAL MEDICATIONS/OPIOIDS/BENZODIAZEPINE IN PAST 24 HOURS: none

## 2023-04-10 NOTE — PROGRESS NOTE ADULT - PROBLEM SELECTOR PLAN 1
LVEF 55-60%, severe biatrial enlargement, moderate TR   Multiple recent HF related hospitalizations. Could be exacerbated by new onset aflutter/bradycardia.   Diuretics: Will consider starting maintenance PO diuretics today  GDMT: Continue Entresto 24-26mg BID and consider starting MRA. Will eventually plan to add SGLT2i. Avoid BB for now.   Low Na+, 1.5L FR diet advised.  Please document strict I&O and daily standing weights.  Would likely benefit from CardioMEMs device to avoid recurrent hospitalizations if patient can prove compliance. LVEF 55-60%, severe biatrial enlargement, moderate TR   Multiple recent HF related hospitalizations. Could be exacerbated by new onset aflutter/bradycardia.   Diuretics: Will start torsemide 20 mg PO BID for maintenance diuretic  GDMT: Continue Entresto 24-26mg BID and start spironolactone 25 mg daily. Will eventually plan to add SGLT2i.   Low Na+, 1.5L FR diet advised.  Please document strict I&O and daily standing weights.  Would likely benefit from CardioMEMs device to avoid recurrent hospitalizations if patient can prove compliance.

## 2023-04-10 NOTE — SWALLOW BEDSIDE ASSESSMENT ADULT - SLP PERTINENT HISTORY OF CURRENT PROBLEM
As per MD note: "83 year old male with PMH HTN, T2DM, CHFpEF, COPD on 2LNC, DVT on Apixaban presented with worsening dyspnea and pedal edema. Also with low heart rate recently. He states he's been worse for past couple days although he's been sick for a couple of months and was also hospitalized in [facility] for 1 week.Denies any dizziness or chest pain but has cough productive of white phlegm. Denies any dietary indiscretion or medications non-compliance."

## 2023-04-10 NOTE — PROGRESS NOTE ADULT - SUBJECTIVE AND OBJECTIVE BOX
patient reports improved breathing    MEDICATIONS  (STANDING):  albuterol/ipratropium for Nebulization 3 milliLiter(s) Nebulizer every 6 hours  dextrose 5%. 1000 milliLiter(s) (50 mL/Hr) IV Continuous <Continuous>  dextrose 5%. 1000 milliLiter(s) (100 mL/Hr) IV Continuous <Continuous>  dextrose 50% Injectable 25 Gram(s) IV Push once  dextrose 50% Injectable 12.5 Gram(s) IV Push once  dextrose 50% Injectable 25 Gram(s) IV Push once  glucagon  Injectable 1 milliGRAM(s) IntraMuscular once  heparin  Infusion. 2000 Unit(s)/Hr (20 mL/Hr) IV Continuous <Continuous>  insulin lispro (ADMELOG) corrective regimen sliding scale   SubCutaneous three times a day before meals  insulin lispro (ADMELOG) corrective regimen sliding scale   SubCutaneous at bedtime  methylPREDNISolone sodium succinate Injectable 40 milliGRAM(s) IV Push every 12 hours  polyethylene glycol 3350 17 Gram(s) Oral daily  sacubitril 24 mG/valsartan 26 mG 1 Tablet(s) Oral two times a day  senna 2 Tablet(s) Oral at bedtime  sodium chloride 0.65% Nasal 1 Spray(s) Both Nostrils three times a day    Vital Signs Last 24 Hrs  T(C): 36.4 (10 Apr 2023 08:00), Max: 36.9 (09 Apr 2023 15:50)  T(F): 97.6 (10 Apr 2023 08:00), Max: 98.4 (09 Apr 2023 15:50)  HR: 99 (10 Apr 2023 10:00) (74 - 103)  BP: 113/50 (10 Apr 2023 10:00) (98/66 - 123/57)  BP(mean): 68 (10 Apr 2023 10:00) (67 - 83)  RR: 22 (10 Apr 2023 10:00) (16 - 26)  SpO2: 99% (10 Apr 2023 10:00) (96% - 99%)    Parameters below as of 10 Apr 2023 10:00  Patient On (Oxygen Delivery Method): nasal cannula  O2 Flow (L/min): 3    04-10    136  |  90<L>  |  39.6<H>  ----------------------------<  129<H>  3.9   |  37.0<H>  |  0.91    Ca    8.8      10 Apr 2023 05:02  Phos  2.3     04-08  Mg     2.4     04-10                          14.9   6.12  )-----------( 72       ( 10 Apr 2023 05:02 )             46.5

## 2023-04-10 NOTE — PROGRESS NOTE ADULT - ASSESSMENT
Acute on chronic hypoxemic, hypercapnic resp failure; over-compensated with alkalemia  COPD, OHS /CHALO /HFpEF   Severe PH with Cor pulmonale  Improved, on 3 L in chair  Needs nocturnal NIV, has ? BIPAP at home need to verify, currently compliant with NIV    Rec:    Continue nebs  On nocturnal Bipap/AVAPS  NCO2 daytime and titrate as needed  wean medrol to prednisone  Optimize cardiac status, now on Entresto  On full AC  Follow CXR for improvement  May not be able to lie flat for EPS procedure and will need NIV  OOB/ambulate as able  Prognosis guarded  Pulm f/u with Dr. Martinez as outpt   Acute on chronic hypoxemic, hypercapnic resp failure; over-compensated with alkalemia  COPD, OHS /CHALO /HFpEF   Severe PH with Cor pulmonale  Improved, on 3 L in chair  Needs nocturnal NIV, has ? BIPAP at home need to verify, currently compliant with NIV    Rec:    Continue nebs  On nocturnal Bipap/AVAPS  NCO2 daytime and titrate as needed  wean medrol to prednisone  Optimize cardiac status, now on Entresto  On full AC  Follow CXR for improvement  May not be able to lie flat for EPS procedure and will need NIV  OOB/ambulate as able  Prognosis guarded, favor BANDAR ? compliance at home  Pulm f/u with Dr. Martinez as outpt   Acute on chronic hypoxemic, hypercapnic resp failure; over-compensated with alkalemia  COPD, OHS /CHALO /HFpEF   Severe PH with Cor pulmonale  Improved, on 3 L in chair  Needs nocturnal NIV, has ? BIPAP at home need to verify, currently compliant with NIV    Rec:    Continue nebs  On nocturnal Bipap/AVAPS  NCO2 daytime and titrate as needed  wean medrol to prednisone  Optimize cardiac status, now on Entresto  Mild metabolic alkalosis, follow lytes, prn Diamox  On full AC  Follow CXR for improvement  May not be able to lie flat for EPS procedure and will need NIV  OOB/ambulate as able  Prognosis guarded, favor BANDAR ? compliance at home  Pulm f/u with Dr. Martinez as outpt

## 2023-04-10 NOTE — PROGRESS NOTE ADULT - SUBJECTIVE AND OBJECTIVE BOX
PULMONARY PROGRESS NOTE      LAINE GUZMANJANIS-[id number]    Patient is a 83y old  Male who presents with a chief complaint of Ankle swelling and hard time breathing (10 Apr 2023 11:34)      INTERVAL HPI/OVERNIGHT EVENTS:  alert, NAD  sitting up in chair  sp02 99% 3 L  no CP or SOB  tolerating NIV nocturnal  MEDICATIONS  (STANDING):  albuterol/ipratropium for Nebulization 3 milliLiter(s) Nebulizer every 6 hours  dextrose 5%. 1000 milliLiter(s) (50 mL/Hr) IV Continuous <Continuous>  dextrose 5%. 1000 milliLiter(s) (100 mL/Hr) IV Continuous <Continuous>  dextrose 50% Injectable 25 Gram(s) IV Push once  dextrose 50% Injectable 12.5 Gram(s) IV Push once  dextrose 50% Injectable 25 Gram(s) IV Push once  glucagon  Injectable 1 milliGRAM(s) IntraMuscular once  heparin  Infusion. 2000 Unit(s)/Hr (20 mL/Hr) IV Continuous <Continuous>  insulin lispro (ADMELOG) corrective regimen sliding scale   SubCutaneous three times a day before meals  insulin lispro (ADMELOG) corrective regimen sliding scale   SubCutaneous at bedtime  methylPREDNISolone sodium succinate Injectable 40 milliGRAM(s) IV Push every 12 hours  polyethylene glycol 3350 17 Gram(s) Oral daily  sacubitril 24 mG/valsartan 26 mG 1 Tablet(s) Oral two times a day  senna 2 Tablet(s) Oral at bedtime  sodium chloride 0.65% Nasal 1 Spray(s) Both Nostrils three times a day      MEDICATIONS  (PRN):  albuterol/ipratropium for Nebulization 3 milliLiter(s) Nebulizer every 3 hours PRN Shortness of Breath and/or Wheezing  bisacodyl Suppository 10 milliGRAM(s) Rectal daily PRN Constipation  dextrose Oral Gel 15 Gram(s) Oral once PRN Blood Glucose LESS THAN 70 milliGRAM(s)/deciliter  heparin   Injectable 15465 Unit(s) IV Push every 6 hours PRN For aPTT less than 40  heparin   Injectable 5000 Unit(s) IV Push every 6 hours PRN For aPTT between 40 - 57  melatonin 3 milliGRAM(s) Oral at bedtime PRN Insomnia  morphine  - Injectable 0.5 milliGRAM(s) IV Push every 6 hours PRN increased work of breathing      Allergies    No Known Allergies    Intolerances        PAST MEDICAL & SURGICAL HISTORY:  Congestive heart failure (CHF)      COPD (chronic obstructive pulmonary disease)      DM (diabetes mellitus)      HTN (hypertension)      BPH (benign prostatic hyperplasia)      S/P cholecystectomy      History of appendectomy          SOCIAL HISTORY  Smoking History:       REVIEW OF SYSTEMS:    CONSTITUTIONAL:  No distress    HEENT:  Eyes:  No diplopia or blurred vision. ENT:  No earache, sore throat or runny nose.    CARDIOVASCULAR:  No pressure, squeezing, tightness, heaviness or aching about the chest; no palpitations.    RESPIRATORY: see HPI    GASTROINTESTINAL:  No nausea, vomiting or diarrhea.    GENITOURINARY:  No dysuria, frequency or urgency.    NEUROLOGIC:  No paresthesias, fasciculations, seizures or weakness.    PSYCHIATRIC:  No disorder of thought or mood.    Vital Signs Last 24 Hrs  T(C): 36.4 (10 Apr 2023 08:00), Max: 36.9 (09 Apr 2023 15:50)  T(F): 97.6 (10 Apr 2023 08:00), Max: 98.4 (09 Apr 2023 15:50)  HR: 99 (10 Apr 2023 10:00) (74 - 103)  BP: 113/50 (10 Apr 2023 10:00) (98/66 - 123/57)  BP(mean): 68 (10 Apr 2023 10:00) (67 - 83)  RR: 22 (10 Apr 2023 10:00) (16 - 26)  SpO2: 99% (10 Apr 2023 10:00) (96% - 99%)    Parameters below as of 10 Apr 2023 10:00  Patient On (Oxygen Delivery Method): nasal cannula  O2 Flow (L/min): 3      PHYSICAL EXAMINATION:    GENERAL: The patient is awake and alert in no apparent distress.     HEENT: Head is normocephalic and atraumatic. Extraocular muscles are intact. Mucous membranes are moist.    NECK: Supple.    LUNGS: decreased BS bases without wheezing, rales or rhonchi; respirations unlabored    HEART: Regular rate and rhythm without murmur.    ABDOMEN: Soft, nontender, and nondistended.      EXTREMITIES: With chronic 1 plus edema.    NEUROLOGIC: Grossly intact.    LABS:                        14.9   6.12  )-----------( 72       ( 10 Apr 2023 05:02 )             46.5     04-10    136  |  90<L>  |  39.6<H>  ----------------------------<  129<H>  3.9   |  37.0<H>  |  0.91    Ca    8.8      10 Apr 2023 05:02  Phos  2.3     04-08  Mg     2.4     04-10      PTT - ( 10 Apr 2023 05:02 )  PTT:86.9 sec    ABG - ( 10 Apr 2023 02:57 )  pH, Arterial: 7.470 pH, Blood: x     /  pCO2: 64    /  pO2: 84    / HCO3: 47    / Base Excess: 22.9  /  SaO2: 98.0                            MICROBIOLOGY:    RADIOLOGY & ADDITIONAL STUDIES:  CXrs reviewed

## 2023-04-10 NOTE — PROGRESS NOTE ADULT - SUBJECTIVE AND OBJECTIVE BOX
LAINE   ----------------------------------------  The patient was seen at bedside. Patient with heart failure and atrial flutter. Reported some episodes of coughing but denied chest pain or palpitations. Noted dyspnea with exertion.    Vital Signs Last 24 Hrs  T(C): 36.4 (10 Apr 2023 08:00), Max: 36.9 (09 Apr 2023 15:50)  T(F): 97.6 (10 Apr 2023 08:00), Max: 98.4 (09 Apr 2023 15:50)  HR: 99 (10 Apr 2023 10:00) (74 - 102)  BP: 113/50 (10 Apr 2023 10:00) (98/66 - 123/57)  BP(mean): 68 (10 Apr 2023 10:00) (67 - 83)  RR: 22 (10 Apr 2023 10:00) (16 - 26)  SpO2: 99% (10 Apr 2023 10:00) (96% - 99%)    Parameters below as of 10 Apr 2023 10:00  Patient On (Oxygen Delivery Method): nasal cannula  O2 Flow (L/min): 3    CAPILLARY BLOOD GLUCOSE  POCT Blood Glucose.: 127 mg/dL (10 Apr 2023 08:53)  POCT Blood Glucose.: 133 mg/dL (09 Apr 2023 21:52)  POCT Blood Glucose.: 119 mg/dL (09 Apr 2023 17:52)  POCT Blood Glucose.: 139 mg/dL (09 Apr 2023 12:20)    PHYSICAL EXAMINATION:  ----------------------------------------  General appearance: No acute distress, Awake, Alert  HEENT: Normocephalic, Atraumatic, Conjunctiva clear, EOMI  Neck: Supple, No JVD, No tenderness  Lungs: Breath sound equal bilaterally, No wheezes, No rales  Cardiovascular: S1S2, Regular rhythm  Abdomen: Soft, Nontender, Nondistended, No guarding/rebound, Positive bowel sounds  Extremities: No clubbing, No cyanosis, No calf tenderness, Lower extremity edema  Neuro: Strength equal bilaterally, No tremors  Psychiatric: Appropriate mood, Normal affect    LABORATORY STUDIES:  ----------------------------------------             14.9   6.12  )-----------( 72       ( 10 Apr 2023 05:02 )             46.5     04-10    136  |  90<L>  |  39.6<H>  ----------------------------<  129<H>  3.9   |  37.0<H>  |  0.91    Ca    8.8      10 Apr 2023 05:02  Phos  2.3     04-08  Mg     2.4     04-10    PTT - ( 10 Apr 2023 05:02 )  PTT:86.9 sec    MEDICATIONS  (STANDING):  albuterol/ipratropium for Nebulization 3 milliLiter(s) Nebulizer every 6 hours  dextrose 5%. 1000 milliLiter(s) (50 mL/Hr) IV Continuous <Continuous>  dextrose 5%. 1000 milliLiter(s) (100 mL/Hr) IV Continuous <Continuous>  dextrose 50% Injectable 25 Gram(s) IV Push once  dextrose 50% Injectable 12.5 Gram(s) IV Push once  dextrose 50% Injectable 25 Gram(s) IV Push once  glucagon  Injectable 1 milliGRAM(s) IntraMuscular once  heparin  Infusion. 2000 Unit(s)/Hr (20 mL/Hr) IV Continuous <Continuous>  insulin lispro (ADMELOG) corrective regimen sliding scale   SubCutaneous three times a day before meals  insulin lispro (ADMELOG) corrective regimen sliding scale   SubCutaneous at bedtime  methylPREDNISolone sodium succinate Injectable 40 milliGRAM(s) IV Push every 12 hours  polyethylene glycol 3350 17 Gram(s) Oral daily  sacubitril 24 mG/valsartan 26 mG 1 Tablet(s) Oral two times a day  senna 2 Tablet(s) Oral at bedtime  sodium chloride 0.65% Nasal 1 Spray(s) Both Nostrils three times a day    MEDICATIONS  (PRN):  albuterol/ipratropium for Nebulization 3 milliLiter(s) Nebulizer every 3 hours PRN Shortness of Breath and/or Wheezing  bisacodyl Suppository 10 milliGRAM(s) Rectal daily PRN Constipation  dextrose Oral Gel 15 Gram(s) Oral once PRN Blood Glucose LESS THAN 70 milliGRAM(s)/deciliter  heparin   Injectable 01143 Unit(s) IV Push every 6 hours PRN For aPTT less than 40  heparin   Injectable 5000 Unit(s) IV Push every 6 hours PRN For aPTT between 40 - 57  melatonin 3 milliGRAM(s) Oral at bedtime PRN Insomnia  morphine  - Injectable 0.5 milliGRAM(s) IV Push every 6 hours PRN increased work of breathing      ASSESSMENT / PLAN:  ----------------------------------------  83 year old male with PMH HTN, T2DM, HFpEF, COPD on 2LNC, DVT on Apixaban presented with worsening dyspnea and pedal edema.  EKG with Atrial flutter with slow VR and Chest X-Ray with congestion. BNP 1652    Acute on Chronic respiratory failure with hypoxia and hypercapnia   Multifactorial: Acute on chronic CHF and COPD exacerbation, CHALO/OHS, Severe PH with Cor pulmonale   ECHO 4/5/23: LVEF 55-60%, severe RA and RV enlargement   - c/w telemetry   - I/O, Daily weight, fluid and salt restriction   - c/w Furosemide 80 mg IV q12   - completed Metolazone x 3 doses   - Per Cardio, Losartan switched to Entresto    - Consider SLGT2i   - Appreciate Cardio and Heart Failure Consult   - c/w IV Solu-medrol, taper dose 4/10   - c/w DuoNebs   - c/w BiPAP (pt did not tolerate AVAPS)   - Appreciate Pulm consult   - Pall care consult appreciated     Atrial Flutter with slow ventricular response   - DC all AVN blockers   - Holding Apixaban, switched to Heparin gtt   - GRZEGORZ + Atrial Flutter ablation - tuesday     HTN   - Entresto   - Monitor BP     T2DM   - BGM with ISS   - A1c 5.9     Traumatic ulcers in oral cavity   - Pt reports BiPAP was placed on him while he had his dentures in which likely caused the ulcers   - bleeding has stopped currently, will monitor   - oral care     VTE prophylaxis - Heparin gtt     Incentive Spirometry, OOB     Guarded Prognosis   Discussed with daughter at bedside  LAINE   ----------------------------------------  The patient was seen at bedside. Patient with heart failure and atrial flutter. Reported some episodes of coughing but denied chest pain or palpitations. Noted dyspnea with exertion.    Vital Signs Last 24 Hrs  T(C): 36.4 (10 Apr 2023 08:00), Max: 36.9 (09 Apr 2023 15:50)  T(F): 97.6 (10 Apr 2023 08:00), Max: 98.4 (09 Apr 2023 15:50)  HR: 99 (10 Apr 2023 10:00) (74 - 102)  BP: 113/50 (10 Apr 2023 10:00) (98/66 - 123/57)  BP(mean): 68 (10 Apr 2023 10:00) (67 - 83)  RR: 22 (10 Apr 2023 10:00) (16 - 26)  SpO2: 99% (10 Apr 2023 10:00) (96% - 99%)    Parameters below as of 10 Apr 2023 10:00  Patient On (Oxygen Delivery Method): nasal cannula  O2 Flow (L/min): 3    CAPILLARY BLOOD GLUCOSE  POCT Blood Glucose.: 127 mg/dL (10 Apr 2023 08:53)  POCT Blood Glucose.: 133 mg/dL (09 Apr 2023 21:52)  POCT Blood Glucose.: 119 mg/dL (09 Apr 2023 17:52)  POCT Blood Glucose.: 139 mg/dL (09 Apr 2023 12:20)    PHYSICAL EXAMINATION:  ----------------------------------------  General appearance: No acute distress, Awake, Alert  HEENT: Normocephalic, Atraumatic, Conjunctiva clear, EOMI, Superficial ulcer to the upper palate  Neck: Supple, No JVD, No tenderness  Lungs: Breath sound equal bilaterally, No wheezes, No rales  Cardiovascular: S1S2, Regular rhythm  Abdomen: Soft, Nontender, Nondistended, No guarding/rebound, Positive bowel sounds  Extremities: No clubbing, No cyanosis, No calf tenderness, Lower extremity edema  Neuro: Strength equal bilaterally, No tremors  Psychiatric: Appropriate mood, Normal affect    LABORATORY STUDIES:  ----------------------------------------             14.9   6.12  )-----------( 72       ( 10 Apr 2023 05:02 )             46.5     04-10    136  |  90<L>  |  39.6<H>  ----------------------------<  129<H>  3.9   |  37.0<H>  |  0.91    Ca    8.8      10 Apr 2023 05:02  Phos  2.3     04-08  Mg     2.4     04-10    PTT - ( 10 Apr 2023 05:02 )  PTT:86.9 sec    MEDICATIONS  (STANDING):  albuterol/ipratropium for Nebulization 3 milliLiter(s) Nebulizer every 6 hours  dextrose 5%. 1000 milliLiter(s) (50 mL/Hr) IV Continuous <Continuous>  dextrose 5%. 1000 milliLiter(s) (100 mL/Hr) IV Continuous <Continuous>  dextrose 50% Injectable 25 Gram(s) IV Push once  dextrose 50% Injectable 12.5 Gram(s) IV Push once  dextrose 50% Injectable 25 Gram(s) IV Push once  glucagon  Injectable 1 milliGRAM(s) IntraMuscular once  heparin  Infusion. 2000 Unit(s)/Hr (20 mL/Hr) IV Continuous <Continuous>  insulin lispro (ADMELOG) corrective regimen sliding scale   SubCutaneous three times a day before meals  insulin lispro (ADMELOG) corrective regimen sliding scale   SubCutaneous at bedtime  methylPREDNISolone sodium succinate Injectable 40 milliGRAM(s) IV Push every 12 hours  polyethylene glycol 3350 17 Gram(s) Oral daily  sacubitril 24 mG/valsartan 26 mG 1 Tablet(s) Oral two times a day  senna 2 Tablet(s) Oral at bedtime  sodium chloride 0.65% Nasal 1 Spray(s) Both Nostrils three times a day    MEDICATIONS  (PRN):  albuterol/ipratropium for Nebulization 3 milliLiter(s) Nebulizer every 3 hours PRN Shortness of Breath and/or Wheezing  bisacodyl Suppository 10 milliGRAM(s) Rectal daily PRN Constipation  dextrose Oral Gel 15 Gram(s) Oral once PRN Blood Glucose LESS THAN 70 milliGRAM(s)/deciliter  heparin   Injectable 45967 Unit(s) IV Push every 6 hours PRN For aPTT less than 40  heparin   Injectable 5000 Unit(s) IV Push every 6 hours PRN For aPTT between 40 - 57  melatonin 3 milliGRAM(s) Oral at bedtime PRN Insomnia  morphine  - Injectable 0.5 milliGRAM(s) IV Push every 6 hours PRN increased work of breathing      ASSESSMENT / PLAN:  ----------------------------------------  83M with a history of hypertension, diabetes, COPD on supplemental oxygen, heart failure, and deep venous thrombosis who presented with increased dyspnea and edema found to have atrial flutter and heart failure for which intravenous furosemide was initiated. Metoprolol was held due to bradycardia.    Acute on chronic hypoxic respiratory failure - Multifactorial with heart failure and COPD. On Bipap. Continue on supplemental oxygen as need with titration as tolerated.    COPD exacerbation - On intravenous methylprednisolone and inhaled bronchodilator therapy.    Atrial flutter - Electrophysiology consultation noted. Planned for ablation. Metoprolol previously held due to bradycardia.    Acute on chronic diastolic heart failure - On sacubitril/valsartan. Previously treated with metolazone and intravenous furosemide. Monitoring urine output.    Diabetes - Insulin coverage, close monitoring of blood glucose levels.    Hypertension - Close blood pressure monitoring.    Oral ulcer - Continue to monitor. No bleeding noted. Continue with oral care. LAINE   ----------------------------------------  The patient was seen at bedside. Patient with heart failure and atrial flutter. Reported some episodes of coughing but denied chest pain or palpitations. Noted dyspnea with exertion.    Vital Signs Last 24 Hrs  T(C): 36.4 (10 Apr 2023 08:00), Max: 36.9 (09 Apr 2023 15:50)  T(F): 97.6 (10 Apr 2023 08:00), Max: 98.4 (09 Apr 2023 15:50)  HR: 99 (10 Apr 2023 10:00) (74 - 102)  BP: 113/50 (10 Apr 2023 10:00) (98/66 - 123/57)  BP(mean): 68 (10 Apr 2023 10:00) (67 - 83)  RR: 22 (10 Apr 2023 10:00) (16 - 26)  SpO2: 99% (10 Apr 2023 10:00) (96% - 99%)    Parameters below as of 10 Apr 2023 10:00  Patient On (Oxygen Delivery Method): nasal cannula  O2 Flow (L/min): 3    CAPILLARY BLOOD GLUCOSE  POCT Blood Glucose.: 127 mg/dL (10 Apr 2023 08:53)  POCT Blood Glucose.: 133 mg/dL (09 Apr 2023 21:52)  POCT Blood Glucose.: 119 mg/dL (09 Apr 2023 17:52)  POCT Blood Glucose.: 139 mg/dL (09 Apr 2023 12:20)    PHYSICAL EXAMINATION:  ----------------------------------------  General appearance: No acute distress, Awake, Alert  HEENT: Normocephalic, Atraumatic, Conjunctiva clear, EOMI, Superficial ulcer to the upper palate  Neck: Supple, No JVD, No tenderness  Lungs: Breath sound equal bilaterally, No wheezes, No rales  Cardiovascular: S1S2, Regular rhythm  Abdomen: Soft, Nontender, Nondistended, No guarding/rebound, Positive bowel sounds  Extremities: No clubbing, No cyanosis, No calf tenderness, Lower extremity edema  Neuro: Strength equal bilaterally, No tremors  Psychiatric: Appropriate mood, Normal affect    LABORATORY STUDIES:  ----------------------------------------             14.9   6.12  )-----------( 72       ( 10 Apr 2023 05:02 )             46.5     04-10    136  |  90<L>  |  39.6<H>  ----------------------------<  129<H>  3.9   |  37.0<H>  |  0.91    Ca    8.8      10 Apr 2023 05:02  Phos  2.3     04-08  Mg     2.4     04-10    PTT - ( 10 Apr 2023 05:02 )  PTT:86.9 sec    MEDICATIONS  (STANDING):  albuterol/ipratropium for Nebulization 3 milliLiter(s) Nebulizer every 6 hours  dextrose 5%. 1000 milliLiter(s) (50 mL/Hr) IV Continuous <Continuous>  dextrose 5%. 1000 milliLiter(s) (100 mL/Hr) IV Continuous <Continuous>  dextrose 50% Injectable 25 Gram(s) IV Push once  dextrose 50% Injectable 12.5 Gram(s) IV Push once  dextrose 50% Injectable 25 Gram(s) IV Push once  glucagon  Injectable 1 milliGRAM(s) IntraMuscular once  heparin  Infusion. 2000 Unit(s)/Hr (20 mL/Hr) IV Continuous <Continuous>  insulin lispro (ADMELOG) corrective regimen sliding scale   SubCutaneous three times a day before meals  insulin lispro (ADMELOG) corrective regimen sliding scale   SubCutaneous at bedtime  methylPREDNISolone sodium succinate Injectable 40 milliGRAM(s) IV Push every 12 hours  polyethylene glycol 3350 17 Gram(s) Oral daily  sacubitril 24 mG/valsartan 26 mG 1 Tablet(s) Oral two times a day  senna 2 Tablet(s) Oral at bedtime  sodium chloride 0.65% Nasal 1 Spray(s) Both Nostrils three times a day    MEDICATIONS  (PRN):  albuterol/ipratropium for Nebulization 3 milliLiter(s) Nebulizer every 3 hours PRN Shortness of Breath and/or Wheezing  bisacodyl Suppository 10 milliGRAM(s) Rectal daily PRN Constipation  dextrose Oral Gel 15 Gram(s) Oral once PRN Blood Glucose LESS THAN 70 milliGRAM(s)/deciliter  heparin   Injectable 25258 Unit(s) IV Push every 6 hours PRN For aPTT less than 40  heparin   Injectable 5000 Unit(s) IV Push every 6 hours PRN For aPTT between 40 - 57  melatonin 3 milliGRAM(s) Oral at bedtime PRN Insomnia  morphine  - Injectable 0.5 milliGRAM(s) IV Push every 6 hours PRN increased work of breathing      ASSESSMENT / PLAN:  ----------------------------------------  83M with a history of hypertension, diabetes, COPD on supplemental oxygen, heart failure, and deep venous thrombosis who presented with increased dyspnea and edema found to have atrial flutter and heart failure for which intravenous furosemide was initiated. Metoprolol was held due to bradycardia.    Acute on chronic hypoxic respiratory failure - Multifactorial with heart failure and COPD. On Bipap. Continue on supplemental oxygen as need with titration as tolerated.    COPD exacerbation - On intravenous methylprednisolone and inhaled bronchodilator therapy.    Atrial flutter - Electrophysiology consultation noted. Planned for ablation. Metoprolol previously held due to bradycardia.    Acute on chronic diastolic heart failure - On sacubitril/valsartan. Previously treated with metolazone and intravenous furosemide. Monitoring urine output.    Diabetes - Insulin coverage, close monitoring of blood glucose levels.    Hypertension - Close blood pressure monitoring.    Thrombocytopenia - To continue monitoring platelet counts.    Oral ulcer - Continue to monitor. No bleeding noted. Continue with oral care.

## 2023-04-10 NOTE — PROGRESS NOTE ADULT - ASSESSMENT
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   Reasonable to try GRZEGORZ followed by DCCV vs ablation for typical flutter

## 2023-04-10 NOTE — PROGRESS NOTE ADULT - ASSESSMENT
83M with hx of HTN, DM2, HFpEF, COPD on home O2 (2L), chronic respiratory failure on nocturnal cpap (noncompliant with use), DVT on apixaban, multiple hospitalizations (also at [facility]) admitted for acute on chronic respiratory failure with hypoxia and hypercarbia secondary to acute decompensated heart failure and new onset atrial flutter. Palliative was consulted for support and to assist with goals of care.     Acute on Chronic Respiratory Failure with Hypoxia + Hypercarbia  - on home O2 NC 2L and nocturnal Cpap (noncompliant)  - etiology likely multifactorial: CHF, COPD, CHALO/OHS  - O2 supplement and nocturnal Bipap  - nebs ATC, O2 supplement    - Pulm following, will need eventual outpatient follow up     Acute Decompensated HFpEF  - TTE with EF 55-60%, severe pulm HTN  - s/p IV lasix  - CHF team following, input appreciated, possible benefit of [facility]io[facility]  - cardio consult noted, input appreciated     Aflutter  - planned for GRZEGORZ +ablation vs DCCV this week pending pulm/CHF status per EP     Dysphagia  - pending SLP eval as pt reporting difficulty eating ?secondary to oral ulcers from dentures  - aspiration precaution     Constipation  - on senna, miralax added by primary team today  - can consider dulcolax PRN if still without BM    Palliative Care Encounter  - HCP/surrogate: daughter Alma    - FULL CODE    - GOC: pt wishes to continue pursuit of ongoing medical interventions with eventual return back home. Daughter will continue to discuss advance directives with father when discharged as she hopes father will participate in making his own decisions to guide family down the road.   - Pt continues to be medically optimized at this time.   - Palliative team will continue to support and follow clinical progress peripheral

## 2023-04-10 NOTE — SWALLOW BEDSIDE ASSESSMENT ADULT - SWALLOW EVAL: DIAGNOSIS
Oral phase of swallow unremarkable. suspect pharyngeal dysphagia w/ thin liquids marked by wet vocal quality and wet cough post intake

## 2023-04-10 NOTE — SWALLOW BEDSIDE ASSESSMENT ADULT - SLP GENERAL OBSERVATIONS
Pt recd a&ox3, cooperative, O2 via NC in place, SpO2 @ 98%, NAD< 0/10 pain scale pre/post, daughter @ the bedside

## 2023-04-10 NOTE — PROGRESS NOTE ADULT - SUBJECTIVE AND OBJECTIVE BOX
ADVANCED HEART FAILURE & TRANSPLANT  - PROGRESS NOTE  _______________________________________________________________________________________________________    Subjective:  - patient reports difficulty swallowing and coughing after eating/drinking  - also reports constipation  - denies any SOB, lightheadedness, or dizziness    Medications:  albuterol/ipratropium for Nebulization 3 milliLiter(s) Nebulizer every 6 hours  albuterol/ipratropium for Nebulization 3 milliLiter(s) Nebulizer every 3 hours PRN  bisacodyl Suppository 10 milliGRAM(s) Rectal daily PRN  dextrose 5%. 1000 milliLiter(s) IV Continuous <Continuous>  dextrose 5%. 1000 milliLiter(s) IV Continuous <Continuous>  dextrose 50% Injectable 25 Gram(s) IV Push once  dextrose 50% Injectable 12.5 Gram(s) IV Push once  dextrose 50% Injectable 25 Gram(s) IV Push once  dextrose Oral Gel 15 Gram(s) Oral once PRN  glucagon  Injectable 1 milliGRAM(s) IntraMuscular once  heparin   Injectable 93621 Unit(s) IV Push every 6 hours PRN  heparin   Injectable 5000 Unit(s) IV Push every 6 hours PRN  heparin  Infusion. 2000 Unit(s)/Hr IV Continuous <Continuous>  insulin lispro (ADMELOG) corrective regimen sliding scale   SubCutaneous three times a day before meals  insulin lispro (ADMELOG) corrective regimen sliding scale   SubCutaneous at bedtime  melatonin 3 milliGRAM(s) Oral at bedtime PRN  methylPREDNISolone sodium succinate Injectable 40 milliGRAM(s) IV Push every 12 hours  morphine  - Injectable 0.5 milliGRAM(s) IV Push every 6 hours PRN  polyethylene glycol 3350 17 Gram(s) Oral daily  sacubitril 24 mG/valsartan 26 mG 1 Tablet(s) Oral two times a day  senna 2 Tablet(s) Oral at bedtime  sodium chloride 0.65% Nasal 1 Spray(s) Both Nostrils three times a day      ICU Vital Signs Last 24 Hrs  T(C): 36.4, Max: 36.9 (04-09 @ 15:50)  HR: 99 (74 - 103)  BP: 113/50 (98/66 - 123/57)  BP(mean): 68 (67 - 83)  ABP: --  ABP(mean): --  RR: 22 (16 - 26)  SpO2: 99% (96% - 99%)        I&O's Summary Last 24 Hrs    IN: 253 mL / OUT: 0 mL / NET: 253 mL      Tele: Aflutter 100s, up to 130-150s    Physical Exam:    General: No distress. Comfortable.  HEENT: EOM intact.  Neck: JVP not elevated.  Respiratory: Bibasilar crackles  CV: Normal S1 and S2. No murmurs, rub, or gallops. Radial pulses normal.  Abdomen: Soft, non-distended, non-tender  Skin: No skin lesions  Extremities: Warm, 1+ BLE edema  Neurology: Non-focal, alert and oriented times three.   Psych: Affect normal    Labs:    ( 04-10-23 @ 05:02 )               14.9   6.12  )--------( 72                   46.5     ( 04-10-23 @ 05:02 )     136  |  90  |  39.6  ---------------------<  129  3.9  |  37.0  |  0.91    Ca 8.8  Phos x   Mg 2.4      PTT/PT/INR - ( 04-10-23 @ 05:02 )  PTT: 86.9 sec / PT: x     / INR: x        ABG - ( 04-10-23 @ 02:57 )  pH: 7.470 / pCO2: 64    / pO2: 84    / HCO3: 47    / Base Excess: 22.9  / SaO2: 98.0    ADVANCED HEART FAILURE & TRANSPLANT  - PROGRESS NOTE  _______________________________________________________________________________________________________    Subjective:  - patient reports difficulty swallowing and coughing after eating/drinking  - also reports constipation  - denies any SOB, lightheadedness, or dizziness    Medications:  albuterol/ipratropium for Nebulization 3 milliLiter(s) Nebulizer every 6 hours  albuterol/ipratropium for Nebulization 3 milliLiter(s) Nebulizer every 3 hours PRN  bisacodyl Suppository 10 milliGRAM(s) Rectal daily PRN  dextrose 5%. 1000 milliLiter(s) IV Continuous <Continuous>  dextrose 5%. 1000 milliLiter(s) IV Continuous <Continuous>  dextrose 50% Injectable 25 Gram(s) IV Push once  dextrose 50% Injectable 12.5 Gram(s) IV Push once  dextrose 50% Injectable 25 Gram(s) IV Push once  dextrose Oral Gel 15 Gram(s) Oral once PRN  glucagon  Injectable 1 milliGRAM(s) IntraMuscular once  heparin   Injectable 58795 Unit(s) IV Push every 6 hours PRN  heparin   Injectable 5000 Unit(s) IV Push every 6 hours PRN  heparin  Infusion. 2000 Unit(s)/Hr IV Continuous <Continuous>  insulin lispro (ADMELOG) corrective regimen sliding scale   SubCutaneous three times a day before meals  insulin lispro (ADMELOG) corrective regimen sliding scale   SubCutaneous at bedtime  melatonin 3 milliGRAM(s) Oral at bedtime PRN  methylPREDNISolone sodium succinate Injectable 40 milliGRAM(s) IV Push every 12 hours  morphine  - Injectable 0.5 milliGRAM(s) IV Push every 6 hours PRN  polyethylene glycol 3350 17 Gram(s) Oral daily  sacubitril 24 mG/valsartan 26 mG 1 Tablet(s) Oral two times a day  senna 2 Tablet(s) Oral at bedtime  sodium chloride 0.65% Nasal 1 Spray(s) Both Nostrils three times a day      ICU Vital Signs Last 24 Hrs  T(C): 36.4, Max: 36.9 (04-09 @ 15:50)  HR: 99 (74 - 103)  BP: 113/50 (98/66 - 123/57)  BP(mean): 68 (67 - 83)  ABP: --  ABP(mean): --  RR: 22 (16 - 26)  SpO2: 99% (96% - 99%)        I&O's Summary Last 24 Hrs    IN: 253 mL / OUT: 0 mL / NET: 253 mL      Tele: Aflutter 100s, up to 130-150s    Physical Exam:    General: No distress. Comfortable.  HEENT: EOM intact.  Neck: JVP not elevated.  Respiratory: Bibasilar crackles  CV: Normal S1 and S2. No murmurs, rub, or gallops. Radial pulses normal.  Abdomen: Soft, non-distended, non-tender  Skin: No skin lesions  Extremities: cool B/L upper and lower extremities, 1+ BLE edema  Neurology: Non-focal, alert.  Psych: Affect normal    Labs:    ( 04-10-23 @ 05:02 )               14.9   6.12  )--------( 72                   46.5     ( 04-10-23 @ 05:02 )     136  |  90  |  39.6  ---------------------<  129  3.9  |  37.0  |  0.91    Ca 8.8  Phos x   Mg 2.4      PTT/PT/INR - ( 04-10-23 @ 05:02 )  PTT: 86.9 sec / PT: x     / INR: x        ABG - ( 04-10-23 @ 02:57 )  pH: 7.470 / pCO2: 64    / pO2: 84    / HCO3: 47    / Base Excess: 22.9  / SaO2: 98.0

## 2023-04-10 NOTE — SWALLOW BEDSIDE ASSESSMENT ADULT - SWALLOW EVAL: PATIENT/FAMILY GOALS STATEMENT
Pt reports he coughs at home w/ eating and drinking which is when he usually drinks a pepsi which "makes the cough worse but helps get the phlegm up". Pt's daughter @ the bedside in agreement w/ pt's statement.

## 2023-04-11 LAB
ANION GAP SERPL CALC-SCNC: 10 MMOL/L — SIGNIFICANT CHANGE UP (ref 5–17)
APTT BLD: 162.6 SEC — CRITICAL HIGH (ref 27.5–35.5)
APTT BLD: 54.8 SEC — HIGH (ref 27.5–35.5)
APTT BLD: 99.7 SEC — HIGH (ref 27.5–35.5)
BUN SERPL-MCNC: 41.8 MG/DL — HIGH (ref 8–20)
CALCIUM SERPL-MCNC: 9.2 MG/DL — SIGNIFICANT CHANGE UP (ref 8.4–10.5)
CHLORIDE SERPL-SCNC: 96 MMOL/L — SIGNIFICANT CHANGE UP (ref 96–108)
CO2 SERPL-SCNC: 30 MMOL/L — HIGH (ref 22–29)
CREAT SERPL-MCNC: 0.96 MG/DL — SIGNIFICANT CHANGE UP (ref 0.5–1.3)
EGFR: 78 ML/MIN/1.73M2 — SIGNIFICANT CHANGE UP
GLUCOSE BLDC GLUCOMTR-MCNC: 103 MG/DL — HIGH (ref 70–99)
GLUCOSE BLDC GLUCOMTR-MCNC: 119 MG/DL — HIGH (ref 70–99)
GLUCOSE BLDC GLUCOMTR-MCNC: 140 MG/DL — HIGH (ref 70–99)
GLUCOSE BLDC GLUCOMTR-MCNC: 159 MG/DL — HIGH (ref 70–99)
GLUCOSE SERPL-MCNC: 126 MG/DL — HIGH (ref 70–99)
HCT VFR BLD CALC: 48.4 % — SIGNIFICANT CHANGE UP (ref 39–50)
HGB BLD-MCNC: 15.3 G/DL — SIGNIFICANT CHANGE UP (ref 13–17)
MAGNESIUM SERPL-MCNC: 2.5 MG/DL — SIGNIFICANT CHANGE UP (ref 1.6–2.6)
MCHC RBC-ENTMCNC: 31.6 GM/DL — LOW (ref 32–36)
MCHC RBC-ENTMCNC: 33.8 PG — SIGNIFICANT CHANGE UP (ref 27–34)
MCV RBC AUTO: 106.8 FL — HIGH (ref 80–100)
PLATELET # BLD AUTO: 71 K/UL — LOW (ref 150–400)
POTASSIUM SERPL-MCNC: 5 MMOL/L — SIGNIFICANT CHANGE UP (ref 3.5–5.3)
POTASSIUM SERPL-SCNC: 5 MMOL/L — SIGNIFICANT CHANGE UP (ref 3.5–5.3)
RBC # BLD: 4.53 M/UL — SIGNIFICANT CHANGE UP (ref 4.2–5.8)
RBC # FLD: 13.6 % — SIGNIFICANT CHANGE UP (ref 10.3–14.5)
SODIUM SERPL-SCNC: 136 MMOL/L — SIGNIFICANT CHANGE UP (ref 135–145)
WBC # BLD: 6.54 K/UL — SIGNIFICANT CHANGE UP (ref 3.8–10.5)
WBC # FLD AUTO: 6.54 K/UL — SIGNIFICANT CHANGE UP (ref 3.8–10.5)

## 2023-04-11 PROCEDURE — 99233 SBSQ HOSP IP/OBS HIGH 50: CPT

## 2023-04-11 PROCEDURE — 99231 SBSQ HOSP IP/OBS SF/LOW 25: CPT

## 2023-04-11 RX ADMIN — HEPARIN SODIUM 700 UNIT(S)/HR: 5000 INJECTION INTRAVENOUS; SUBCUTANEOUS at 22:41

## 2023-04-11 RX ADMIN — Medication 3 MILLILITER(S): at 08:50

## 2023-04-11 RX ADMIN — Medication 20 MILLIGRAM(S): at 13:29

## 2023-04-11 RX ADMIN — Medication 40 MILLIGRAM(S): at 17:34

## 2023-04-11 RX ADMIN — HEPARIN SODIUM 1100 UNIT(S)/HR: 5000 INJECTION INTRAVENOUS; SUBCUTANEOUS at 19:22

## 2023-04-11 RX ADMIN — SPIRONOLACTONE 25 MILLIGRAM(S): 25 TABLET, FILM COATED ORAL at 06:11

## 2023-04-11 RX ADMIN — Medication 1 SPRAY(S): at 13:29

## 2023-04-11 RX ADMIN — Medication 3 MILLILITER(S): at 21:21

## 2023-04-11 RX ADMIN — SACUBITRIL AND VALSARTAN 1 TABLET(S): 24; 26 TABLET, FILM COATED ORAL at 17:34

## 2023-04-11 RX ADMIN — SENNA PLUS 2 TABLET(S): 8.6 TABLET ORAL at 22:42

## 2023-04-11 RX ADMIN — HEPARIN SODIUM 800 UNIT(S)/HR: 5000 INJECTION INTRAVENOUS; SUBCUTANEOUS at 07:48

## 2023-04-11 RX ADMIN — POLYETHYLENE GLYCOL 3350 17 GRAM(S): 17 POWDER, FOR SOLUTION ORAL at 13:29

## 2023-04-11 RX ADMIN — Medication 40 MILLIGRAM(S): at 06:11

## 2023-04-11 RX ADMIN — Medication 1 SPRAY(S): at 22:42

## 2023-04-11 RX ADMIN — HEPARIN SODIUM 5000 UNIT(S): 5000 INJECTION INTRAVENOUS; SUBCUTANEOUS at 14:23

## 2023-04-11 RX ADMIN — HEPARIN SODIUM 1100 UNIT(S)/HR: 5000 INJECTION INTRAVENOUS; SUBCUTANEOUS at 14:20

## 2023-04-11 RX ADMIN — SACUBITRIL AND VALSARTAN 1 TABLET(S): 24; 26 TABLET, FILM COATED ORAL at 06:12

## 2023-04-11 RX ADMIN — Medication 20 MILLIGRAM(S): at 06:12

## 2023-04-11 RX ADMIN — HEPARIN SODIUM 1100 UNIT(S)/HR: 5000 INJECTION INTRAVENOUS; SUBCUTANEOUS at 19:28

## 2023-04-11 RX ADMIN — Medication 3 MILLILITER(S): at 15:50

## 2023-04-11 RX ADMIN — HEPARIN SODIUM 0 UNIT(S)/HR: 5000 INJECTION INTRAVENOUS; SUBCUTANEOUS at 21:37

## 2023-04-11 RX ADMIN — HEPARIN SODIUM 1100 UNIT(S)/HR: 5000 INJECTION INTRAVENOUS; SUBCUTANEOUS at 07:17

## 2023-04-11 RX ADMIN — Medication 10 MILLIGRAM(S): at 09:17

## 2023-04-11 NOTE — PROGRESS NOTE ADULT - PROBLEM SELECTOR PLAN 1
LVEF 55-60%, severe biatrial enlargement, moderate TR   Multiple recent HF related hospitalizations. Could be exacerbated by new onset aflutter/bradycardia.   Diuretics: continue on torsemide 20 mg PO BID for maintenance diuretic  GDMT: Continue Entresto 24-26mg BID and spironolactone 25 mg daily. Will eventually plan to add SGLT2i.   Low Na+, 1.5L FR diet advised.  Please document strict I&O and daily standing weights.  Would likely benefit from CardioMEMs device to avoid recurrent hospitalizations if patient can prove compliance.    Now on home dose of NC.  His volume status is much improved since admission.   Pulm following for additional optimization.

## 2023-04-11 NOTE — PROGRESS NOTE ADULT - SUBJECTIVE AND OBJECTIVE BOX
Subjective: Reports improvement in breathing.     TELE: atrial flutter with occasional RVR    MEDICATIONS  (STANDING):  albuterol/ipratropium for Nebulization 3 milliLiter(s) Nebulizer every 6 hours  dextrose 5%. 1000 milliLiter(s) (100 mL/Hr) IV Continuous <Continuous>  dextrose 5%. 1000 milliLiter(s) (50 mL/Hr) IV Continuous <Continuous>  dextrose 50% Injectable 25 Gram(s) IV Push once  dextrose 50% Injectable 12.5 Gram(s) IV Push once  dextrose 50% Injectable 25 Gram(s) IV Push once  glucagon  Injectable 1 milliGRAM(s) IntraMuscular once  heparin  Infusion. 2000 Unit(s)/Hr (20 mL/Hr) IV Continuous <Continuous>  insulin lispro (ADMELOG) corrective regimen sliding scale   SubCutaneous three times a day before meals  insulin lispro (ADMELOG) corrective regimen sliding scale   SubCutaneous at bedtime  methylPREDNISolone sodium succinate Injectable 40 milliGRAM(s) IV Push every 12 hours  polyethylene glycol 3350 17 Gram(s) Oral daily  sacubitril 24 mG/valsartan 26 mG 1 Tablet(s) Oral two times a day  senna 2 Tablet(s) Oral at bedtime  sodium chloride 0.65% Nasal 1 Spray(s) Both Nostrils three times a day  spironolactone 25 milliGRAM(s) Oral daily  torsemide 20 milliGRAM(s) Oral two times a day    MEDICATIONS  (PRN):  albuterol/ipratropium for Nebulization 3 milliLiter(s) Nebulizer every 3 hours PRN Shortness of Breath and/or Wheezing  bisacodyl Suppository 10 milliGRAM(s) Rectal daily PRN Constipation  dextrose Oral Gel 15 Gram(s) Oral once PRN Blood Glucose LESS THAN 70 milliGRAM(s)/deciliter  heparin   Injectable 93354 Unit(s) IV Push every 6 hours PRN For aPTT less than 40  heparin   Injectable 5000 Unit(s) IV Push every 6 hours PRN For aPTT between 40 - 57  melatonin 3 milliGRAM(s) Oral at bedtime PRN Insomnia  morphine  - Injectable 0.5 milliGRAM(s) IV Push every 6 hours PRN increased work of breathing      Allergies    No Known Allergies    Intolerances        Vital Signs Last 24 Hrs  T(C): 36.6 (11 Apr 2023 07:49), Max: 36.8 (10 Apr 2023 16:00)  T(F): 97.9 (11 Apr 2023 07:49), Max: 98.3 (10 Apr 2023 16:00)  HR: 102 (11 Apr 2023 12:00) (71 - 109)  BP: 101/59 (11 Apr 2023 12:00) (101/59 - 135/82)  BP(mean): 72 (11 Apr 2023 12:00) (69 - 92)  RR: 17 (11 Apr 2023 12:00) (16 - 20)  SpO2: 100% (11 Apr 2023 12:00) (91% - 100%)    Parameters below as of 11 Apr 2023 12:00  Patient On (Oxygen Delivery Method): nasal cannula  O2 Flow (L/min): 3      Physical Exam:  Constitutional: NAD, AAOx3  Cardiovascular: +S1S2 RRR  Pulmonary: CTA b/l, unlabored  GI: soft NTND +BS  Extremities: no pedal edema, +distal pulses b/l  Neuro: non focal, GALVAN x4    LABS:                        15.3   6.54  )-----------( 71       ( 11 Apr 2023 04:40 )             48.4     04-11    136  |  96  |  41.8<H>  ----------------------------<  126<H>  5.0   |  30.0<H>  |  0.96    Ca    9.2      11 Apr 2023 04:40  Mg     2.5     04-11      PTT - ( 11 Apr 2023 06:25 )  PTT:99.7 sec      RADIOLOGY & ADDITIONAL TESTS:

## 2023-04-11 NOTE — PROGRESS NOTE ADULT - SUBJECTIVE AND OBJECTIVE BOX
Interval Hx: Tachycardic this AM while sitting up    Heart failure    Handoff    MEWS Score    Congestive heart failure (CHF)    COPD (chronic obstructive pulmonary disease)    DM (diabetes mellitus)    HTN (hypertension)    BPH (benign prostatic hyperplasia)    CHF exacerbation    Acute on chronic diastolic congestive heart failure    Bradycardia    Atrial flutter    Acute on chronic respiratory failure with hypoxia    COPD without exacerbation    HTN (hypertension)    T2DM (type 2 diabetes mellitus)    Acute on chronic respiratory failure with hypoxia and hypercapnia    Obesity hypoventilation syndrome    CHALO (obstructive sleep apnea)    S/P cholecystectomy    History of appendectomy    LEG SWELLING    New onset atrial flutter    Bradycardia    [email or web address]        albuterol/ipratropium for Nebulization 3 milliLiter(s) Nebulizer every 6 hours  albuterol/ipratropium for Nebulization 3 milliLiter(s) Nebulizer every 3 hours PRN  bisacodyl Suppository 10 milliGRAM(s) Rectal daily PRN  dextrose 5%. 1000 milliLiter(s) IV Continuous <Continuous>  dextrose 5%. 1000 milliLiter(s) IV Continuous <Continuous>  dextrose 50% Injectable 25 Gram(s) IV Push once  dextrose 50% Injectable 12.5 Gram(s) IV Push once  dextrose 50% Injectable 25 Gram(s) IV Push once  dextrose Oral Gel 15 Gram(s) Oral once PRN  glucagon  Injectable 1 milliGRAM(s) IntraMuscular once  heparin   Injectable 05363 Unit(s) IV Push every 6 hours PRN  heparin   Injectable 5000 Unit(s) IV Push every 6 hours PRN  heparin  Infusion. 2000 Unit(s)/Hr IV Continuous <Continuous>  insulin lispro (ADMELOG) corrective regimen sliding scale   SubCutaneous three times a day before meals  insulin lispro (ADMELOG) corrective regimen sliding scale   SubCutaneous at bedtime  melatonin 3 milliGRAM(s) Oral at bedtime PRN  methylPREDNISolone sodium succinate Injectable 40 milliGRAM(s) IV Push every 12 hours  morphine  - Injectable 0.5 milliGRAM(s) IV Push every 6 hours PRN  polyethylene glycol 3350 17 Gram(s) Oral daily  sacubitril 24 mG/valsartan 26 mG 1 Tablet(s) Oral two times a day  senna 2 Tablet(s) Oral at bedtime  sodium chloride 0.65% Nasal 1 Spray(s) Both Nostrils three times a day  spironolactone 25 milliGRAM(s) Oral daily  torsemide 20 milliGRAM(s) Oral two times a day      T(C): 36.6 (04-11-23 @ 07:49), Max: 37.3 (04-10-23 @ 12:00)  HR: 87 (04-11-23 @ 08:00) (71 - 101)  BP: 103/55 (04-11-23 @ 08:00) (103/54 - 135/82)  RR: 18 (04-11-23 @ 08:00) (16 - 25)  SpO2: 100% (04-11-23 @ 08:00) (94% - 100%)    04-10-23 @ 07:01  -  04-11-23 @ 07:00  --------------------------------------------------------  IN: 1203 mL / OUT: 0 mL / NET: 1203 mL    04-11-23 @ 07:01  -  04-11-23 @ 10:01  --------------------------------------------------------  IN: 240 mL / OUT: 0 mL / NET: 240 mL        Gen: no distress  CV: tachy, irregular, normal S1 and S2  Resp: Lungs CTA      04-11    136  |  96  |  41.8<H>  ----------------------------<  126<H>  5.0   |  30.0<H>  |  0.96    Ca    9.2      11 Apr 2023 04:40  Mg     2.5     04-11                          15.3   6.54  )-----------( 71       ( 11 Apr 2023 04:40 )             48.4

## 2023-04-11 NOTE — PROGRESS NOTE ADULT - SUBJECTIVE AND OBJECTIVE BOX
LAINE   ----------------------------------------  The patient was seen at bedside. Patient with heart failure and atrial flutter. Offered no complaints.    Vital Signs Last 24 Hrs  T(C): 36.6 (11 Apr 2023 07:49), Max: 37.3 (10 Apr 2023 12:00)  T(F): 97.9 (11 Apr 2023 07:49), Max: 99.2 (10 Apr 2023 12:00)  HR: 87 (11 Apr 2023 08:00) (71 - 101)  BP: 103/55 (11 Apr 2023 08:00) (103/54 - 135/82)  BP(mean): 70 (11 Apr 2023 08:00) (69 - 92)  RR: 18 (11 Apr 2023 08:00) (16 - 25)  SpO2: 100% (11 Apr 2023 08:00) (94% - 100%)    Parameters below as of 11 Apr 2023 08:00  Patient On (Oxygen Delivery Method): nasal cannula  O2 Flow (L/min): 3    CAPILLARY BLOOD GLUCOSE  POCT Blood Glucose.: 119 mg/dL (11 Apr 2023 09:08)  POCT Blood Glucose.: 135 mg/dL (10 Apr 2023 21:26)  POCT Blood Glucose.: 118 mg/dL (10 Apr 2023 17:16)  POCT Blood Glucose.: 170 mg/dL (10 Apr 2023 12:04)    PHYSICAL EXAMINATION:  ----------------------------------------  General appearance: No acute distress, Awake, Alert  HEENT: Normocephalic, Atraumatic, Conjunctiva clear, EOMI  Neck: Supple, No JVD, No tenderness  Lungs: Breath sound equal bilaterally, No wheezes, No rales  Cardiovascular: S1S2, Regular rhythm  Abdomen: Soft, Nontender, Nondistended, No guarding/rebound, Positive bowel sounds  Extremities: No clubbing, No cyanosis, No calf tenderness, Lower extremity edema  Neuro: Strength equal bilaterally, No tremors  Psychiatric: Appropriate mood, Normal affect    LABORATORY STUDIES:  ----------------------------------------             15.3   6.54  )-----------( 71       ( 11 Apr 2023 04:40 )             48.4     04-11    136  |  96  |  41.8<H>  ----------------------------<  126<H>  5.0   |  30.0<H>  |  0.96    Ca    9.2      11 Apr 2023 04:40  Mg     2.5     04-11    PTT - ( 11 Apr 2023 06:25 )  PTT:99.7 sec    MEDICATIONS  (STANDING):  albuterol/ipratropium for Nebulization 3 milliLiter(s) Nebulizer every 6 hours  dextrose 5%. 1000 milliLiter(s) (50 mL/Hr) IV Continuous <Continuous>  dextrose 5%. 1000 milliLiter(s) (100 mL/Hr) IV Continuous <Continuous>  dextrose 50% Injectable 25 Gram(s) IV Push once  dextrose 50% Injectable 12.5 Gram(s) IV Push once  dextrose 50% Injectable 25 Gram(s) IV Push once  glucagon  Injectable 1 milliGRAM(s) IntraMuscular once  heparin  Infusion. 2000 Unit(s)/Hr (20 mL/Hr) IV Continuous <Continuous>  insulin lispro (ADMELOG) corrective regimen sliding scale   SubCutaneous three times a day before meals  insulin lispro (ADMELOG) corrective regimen sliding scale   SubCutaneous at bedtime  methylPREDNISolone sodium succinate Injectable 40 milliGRAM(s) IV Push every 12 hours  polyethylene glycol 3350 17 Gram(s) Oral daily  sacubitril 24 mG/valsartan 26 mG 1 Tablet(s) Oral two times a day  senna 2 Tablet(s) Oral at bedtime  sodium chloride 0.65% Nasal 1 Spray(s) Both Nostrils three times a day  spironolactone 25 milliGRAM(s) Oral daily  torsemide 20 milliGRAM(s) Oral two times a day    MEDICATIONS  (PRN):  albuterol/ipratropium for Nebulization 3 milliLiter(s) Nebulizer every 3 hours PRN Shortness of Breath and/or Wheezing  bisacodyl Suppository 10 milliGRAM(s) Rectal daily PRN Constipation  dextrose Oral Gel 15 Gram(s) Oral once PRN Blood Glucose LESS THAN 70 milliGRAM(s)/deciliter  heparin   Injectable 90314 Unit(s) IV Push every 6 hours PRN For aPTT less than 40  heparin   Injectable 5000 Unit(s) IV Push every 6 hours PRN For aPTT between 40 - 57  melatonin 3 milliGRAM(s) Oral at bedtime PRN Insomnia  morphine  - Injectable 0.5 milliGRAM(s) IV Push every 6 hours PRN increased work of breathing      ASSESSMENT / PLAN:  ----------------------------------------  83M with a history of hypertension, diabetes, COPD on supplemental oxygen, heart failure, and deep venous thrombosis who presented with increased dyspnea and edema found to have atrial flutter and heart failure for which intravenous furosemide was initiated. Metoprolol was held due to bradycardia with improvement in the heart rate.    Acute on chronic hypoxic respiratory failure - Multifactorial with heart failure and COPD. Continue on supplemental oxygen as need with titration as tolerated. Continue with nocturnal Bipap.    COPD exacerbation - On intravenous methylprednisolone and inhaled bronchodilator therapy. Pulmonary consultation noted.    Atrial flutter - Electrophysiology consultation noted. Planned for ablation pending improvement in orthopnea. Metoprolol previously held due to bradycardia.    Acute on chronic diastolic heart failure - On sacubitril/valsartan. Previously treated with metolazone and intravenous furosemide. Monitoring urine output. Heart failure follow up noted, on torsemide and spironolactone.    Diabetes - Insulin coverage, close monitoring of blood glucose levels.    Hypertension - Close blood pressure monitoring.    Thrombocytopenia - To continue monitoring platelet counts.    Oral ulcer - Continue to monitor. No bleeding noted. Continue with oral care.

## 2023-04-11 NOTE — PROGRESS NOTE ADULT - ASSESSMENT
Primary Cardiology: [facility]  HF Cardiologist: Dr. Carreon    82 y/o male, former smoker (quit 7/2022), with h/o chronic HFpEF ACC/AHA stage C, COPD on home O2 (4 LPM via NC) since July 2022, DM2, DVT on Eliquis who was referred from Dr. Booker's office due to ADHF.   Upon arrival to the ED he was found to have newly diagnosed Aflutter with variable block (VR 30-40s). His BP was stable and he denied symptoms associated to bradycardia. His labs were remarkable for elevated pBNP, normal trop, thrombocytopenia and elevated CO2. His CxR showed pulmonary congestion. He was given IV lasix, glucagon and calcium. His AV node blocker agents were held.   This is his 3rd or 4th hospitalization in the past 12 months. He has been admitted multiple times to [facility].     Now off continuous BiPAP and appears close to euvolemic on exam. His renal function has improved and hes normotensive.    Cardiac Studies:  TTE 4/5/23: LVEF 55-60%, LVIDd 5.47cm, severe RA and RV enlargement, trace MR, moderate TR, PASP 61.8 mmHg (RA 15 mmHg).

## 2023-04-11 NOTE — PROGRESS NOTE ADULT - SUBJECTIVE AND OBJECTIVE BOX
PULMONARY PROGRESS NOTE      LAINE GUZMANJANIS-[id number]    Patient is a 83y old  Male who presents with a chief complaint of Ankle swelling and hard time breathing (11 Apr 2023 13:23)      INTERVAL HPI/OVERNIGHT EVENTS:  alert, sitting up in chair  no cp or SOB  3 L 96%  MEDICATIONS  (STANDING):  albuterol/ipratropium for Nebulization 3 milliLiter(s) Nebulizer every 6 hours  dextrose 5%. 1000 milliLiter(s) (50 mL/Hr) IV Continuous <Continuous>  dextrose 5%. 1000 milliLiter(s) (100 mL/Hr) IV Continuous <Continuous>  dextrose 50% Injectable 25 Gram(s) IV Push once  dextrose 50% Injectable 12.5 Gram(s) IV Push once  dextrose 50% Injectable 25 Gram(s) IV Push once  glucagon  Injectable 1 milliGRAM(s) IntraMuscular once  heparin  Infusion. 2000 Unit(s)/Hr (20 mL/Hr) IV Continuous <Continuous>  insulin lispro (ADMELOG) corrective regimen sliding scale   SubCutaneous three times a day before meals  insulin lispro (ADMELOG) corrective regimen sliding scale   SubCutaneous at bedtime  methylPREDNISolone sodium succinate Injectable 40 milliGRAM(s) IV Push every 12 hours  polyethylene glycol 3350 17 Gram(s) Oral daily  sacubitril 24 mG/valsartan 26 mG 1 Tablet(s) Oral two times a day  senna 2 Tablet(s) Oral at bedtime  sodium chloride 0.65% Nasal 1 Spray(s) Both Nostrils three times a day  spironolactone 25 milliGRAM(s) Oral daily  torsemide 20 milliGRAM(s) Oral two times a day      MEDICATIONS  (PRN):  albuterol/ipratropium for Nebulization 3 milliLiter(s) Nebulizer every 3 hours PRN Shortness of Breath and/or Wheezing  bisacodyl Suppository 10 milliGRAM(s) Rectal daily PRN Constipation  dextrose Oral Gel 15 Gram(s) Oral once PRN Blood Glucose LESS THAN 70 milliGRAM(s)/deciliter  heparin   Injectable 34184 Unit(s) IV Push every 6 hours PRN For aPTT less than 40  heparin   Injectable 5000 Unit(s) IV Push every 6 hours PRN For aPTT between 40 - 57  melatonin 3 milliGRAM(s) Oral at bedtime PRN Insomnia  morphine  - Injectable 0.5 milliGRAM(s) IV Push every 6 hours PRN increased work of breathing      Allergies    No Known Allergies    Intolerances        PAST MEDICAL & SURGICAL HISTORY:  Congestive heart failure (CHF)      COPD (chronic obstructive pulmonary disease)      DM (diabetes mellitus)      HTN (hypertension)      BPH (benign prostatic hyperplasia)      S/P cholecystectomy      History of appendectomy          SOCIAL HISTORY  Smoking History:       REVIEW OF SYSTEMS:    CONSTITUTIONAL:  No distress    HEENT:  Eyes:  No diplopia or blurred vision. ENT:  No earache, sore throat or runny nose.    CARDIOVASCULAR:  No pressure, squeezing, tightness, heaviness or aching about the chest; no palpitations.    RESPIRATORY:  see HPI    GASTROINTESTINAL:  No nausea, vomiting or diarrhea.    GENITOURINARY:  No dysuria, frequency or urgency.    NEUROLOGIC:  No paresthesias, fasciculations, seizures or weakness.    PSYCHIATRIC:  No disorder of thought or mood.    Vital Signs Last 24 Hrs  T(C): 36.6 (11 Apr 2023 07:49), Max: 36.8 (10 Apr 2023 16:00)  T(F): 97.9 (11 Apr 2023 07:49), Max: 98.3 (10 Apr 2023 16:00)  HR: 102 (11 Apr 2023 12:00) (71 - 109)  BP: 101/59 (11 Apr 2023 12:00) (101/59 - 135/82)  BP(mean): 72 (11 Apr 2023 12:00) (69 - 92)  RR: 17 (11 Apr 2023 12:00) (16 - 20)  SpO2: 100% (11 Apr 2023 12:00) (91% - 100%)    Parameters below as of 11 Apr 2023 12:00  Patient On (Oxygen Delivery Method): nasal cannula  O2 Flow (L/min): 3      PHYSICAL EXAMINATION:    GENERAL: The patient is awake and alert in no apparent distress.     HEENT: Head is normocephalic and atraumatic. Extraocular muscles are intact. Mucous membranes are moist.    NECK: Supple.    LUNGS: moderate air entry, rare exp  rhonchi; respirations unlabored    HEART: Regular rate and rhythm without murmur.    ABDOMEN: Soft, nontender, and nondistended.      EXTREMITIES: With edema.    NEUROLOGIC: Grossly intact.    LABS:                        15.3   6.54  )-----------( 71       ( 11 Apr 2023 04:40 )             48.4     04-11    136  |  96  |  41.8<H>  ----------------------------<  126<H>  5.0   |  30.0<H>  |  0.96    Ca    9.2      11 Apr 2023 04:40  Mg     2.5     04-11      PTT - ( 11 Apr 2023 13:45 )  PTT:54.8 sec    ABG - ( 10 Apr 2023 02:57 )  pH, Arterial: 7.470 pH, Blood: x     /  pCO2: 64    /  pO2: 84    / HCO3: 47    / Base Excess: 22.9  /  SaO2: 98.0                            MICROBIOLOGY:    RADIOLOGY & ADDITIONAL STUDIES:

## 2023-04-11 NOTE — PROGRESS NOTE ADULT - ASSESSMENT
83 year old male with HTN, DM 2, HFpEF (LVEF: 55-60%), mod-severe pulmonary hypertension, BPH, COPD (on 4LNC since 7/2022), and DVT (on Apixaban) who presented with acute hypercapnic respiratory failure  and atrial flutter with slow ventricular rates. Presenting slow VR improved after AV theresa blocking agents held. Heart rates are now  and up to 150 for brief periods on rare occasions.     Remains in atrial flutter. Respiratory status continues to improve.     Recommendations:  - No lab availability for ablation this week  - Plan for GRZEGORZ cardioversion prior to discharge once respiratory status has stabilized  - Resume eliquis and stop heparin gtt    Please call with any questions.

## 2023-04-11 NOTE — PROGRESS NOTE ADULT - SUBJECTIVE AND OBJECTIVE BOX
ADVANCED HEART FAILURE & TRANSPLANT  - PROGRESS NOTE  _______________________________________________________________________________________________________    Subjective:  - no plan ablation this week.  No acute overnight events.      Medications:  albuterol/ipratropium for Nebulization 3 milliLiter(s) Nebulizer every 6 hours  albuterol/ipratropium for Nebulization 3 milliLiter(s) Nebulizer every 3 hours PRN  bisacodyl Suppository 10 milliGRAM(s) Rectal daily PRN  dextrose 5%. 1000 milliLiter(s) IV Continuous <Continuous>  dextrose 5%. 1000 milliLiter(s) IV Continuous <Continuous>  dextrose 50% Injectable 25 Gram(s) IV Push once  dextrose 50% Injectable 12.5 Gram(s) IV Push once  dextrose 50% Injectable 25 Gram(s) IV Push once  dextrose Oral Gel 15 Gram(s) Oral once PRN  glucagon  Injectable 1 milliGRAM(s) IntraMuscular once  heparin   Injectable 81566 Unit(s) IV Push every 6 hours PRN  heparin   Injectable 5000 Unit(s) IV Push every 6 hours PRN  heparin  Infusion. 2000 Unit(s)/Hr IV Continuous <Continuous>  insulin lispro (ADMELOG) corrective regimen sliding scale   SubCutaneous three times a day before meals  insulin lispro (ADMELOG) corrective regimen sliding scale   SubCutaneous at bedtime  melatonin 3 milliGRAM(s) Oral at bedtime PRN  methylPREDNISolone sodium succinate Injectable 40 milliGRAM(s) IV Push every 12 hours  morphine  - Injectable 0.5 milliGRAM(s) IV Push every 6 hours PRN  polyethylene glycol 3350 17 Gram(s) Oral daily  sacubitril 24 mG/valsartan 26 mG 1 Tablet(s) Oral two times a day  senna 2 Tablet(s) Oral at bedtime  sodium chloride 0.65% Nasal 1 Spray(s) Both Nostrils three times a day      ICU Vital Signs Last 24 Hrs  T(C): 36.4, Max: 36.9 (04-09 @ 15:50)  HR: 99 (74 - 103)  BP: 113/50 (98/66 - 123/57)  BP(mean): 68 (67 - 83)  ABP: --  ABP(mean): --  RR: 22 (16 - 26)  SpO2: 99% (96% - 99%)        I&O's Summary Last 24 Hrs    IN: 253 mL / OUT: 0 mL / NET: 253 mL      Tele: Aflutter 100s, up to 130-150s    Physical Exam:    General: No distress. Comfortable.  Neck: JVP around 8cm sitting upright.  Respiratory: Bibasilar crackles  CV: Normal S1 and S2. No murmurs, rub, or gallops. Radial pulses normal.  Abdomen: Soft, non-distended, non-tender  Skin: No skin lesions  Extremities: cool B/L upper and lower extremities, 1+ BLE edema  Neurology: Non-focal, alert.    Labs:    ( 04-10-23 @ 05:02 )               14.9   6.12  )--------( 72                   46.5     ( 04-10-23 @ 05:02 )     136  |  90  |  39.6  ---------------------<  129  3.9  |  37.0  |  0.91    Ca 8.8  Phos x   Mg 2.4      PTT/PT/INR - ( 04-10-23 @ 05:02 )  PTT: 86.9 sec / PT: x     / INR: x        ABG - ( 04-10-23 @ 02:57 )  pH: 7.470 / pCO2: 64    / pO2: 84    / HCO3: 47    / Base Excess: 22.9  / SaO2: 98.0     Telemetry atrial flutter HR 90s.

## 2023-04-11 NOTE — PROGRESS NOTE ADULT - SUBJECTIVE AND OBJECTIVE BOX
[facility] PALLIATIVE MEDICINE     CC: FOLLOW UP VISIT + GOC    INTERVAL HPI/OVERNIGHT EVENTS:  Source if other than patient:  []Family   [x]Team     No acute events overnight.   Pt reports difficulty with keeping bipap on last night, had it on for ~30 mins.   Seen by SLP yesterday, diet on regular with mildly thick liquids    PRESENT SYMPTOMS:     Dyspnea: No  Nausea/Vomiting:  No  Anxiety:   No  Depression:  No  Fatigue:  No  Loss of appetite:  No  Constipation:  Yes --> received suppository this morning and pt feels he may need to go soon.    Pain: No            Character-            Duration-            Effect-            Factors-            Frequency-            Location-            Severity-    Pain AD Score:  [email or web address] (press ctrl + left click to view)    Review of Systems: Reviewed                     Negative: no chest pain or dyspnea at rest                     Positive: see above  All others negative    MEDICATIONS  (STANDING):  albuterol/ipratropium for Nebulization 3 milliLiter(s) Nebulizer every 6 hours  dextrose 5%. 1000 milliLiter(s) (50 mL/Hr) IV Continuous <Continuous>  dextrose 5%. 1000 milliLiter(s) (100 mL/Hr) IV Continuous <Continuous>  dextrose 50% Injectable 25 Gram(s) IV Push once  dextrose 50% Injectable 12.5 Gram(s) IV Push once  dextrose 50% Injectable 25 Gram(s) IV Push once  glucagon  Injectable 1 milliGRAM(s) IntraMuscular once  heparin  Infusion. 2000 Unit(s)/Hr (20 mL/Hr) IV Continuous <Continuous>  insulin lispro (ADMELOG) corrective regimen sliding scale   SubCutaneous three times a day before meals  insulin lispro (ADMELOG) corrective regimen sliding scale   SubCutaneous at bedtime  methylPREDNISolone sodium succinate Injectable 40 milliGRAM(s) IV Push every 12 hours  polyethylene glycol 3350 17 Gram(s) Oral daily  sacubitril 24 mG/valsartan 26 mG 1 Tablet(s) Oral two times a day  senna 2 Tablet(s) Oral at bedtime  sodium chloride 0.65% Nasal 1 Spray(s) Both Nostrils three times a day  spironolactone 25 milliGRAM(s) Oral daily  torsemide 20 milliGRAM(s) Oral two times a day    MEDICATIONS  (PRN):  albuterol/ipratropium for Nebulization 3 milliLiter(s) Nebulizer every 3 hours PRN Shortness of Breath and/or Wheezing  bisacodyl Suppository 10 milliGRAM(s) Rectal daily PRN Constipation  dextrose Oral Gel 15 Gram(s) Oral once PRN Blood Glucose LESS THAN 70 milliGRAM(s)/deciliter  heparin   Injectable 53973 Unit(s) IV Push every 6 hours PRN For aPTT less than 40  heparin   Injectable 5000 Unit(s) IV Push every 6 hours PRN For aPTT between 40 - 57  melatonin 3 milliGRAM(s) Oral at bedtime PRN Insomnia  morphine  - Injectable 0.5 milliGRAM(s) IV Push every 6 hours PRN increased work of breathing    PHYSICAL EXAM:    Vital Signs Last 24 Hrs  T(C): 36.6 (11 Apr 2023 07:49), Max: 37.3 (10 Apr 2023 12:00)  T(F): 97.9 (11 Apr 2023 07:49), Max: 99.2 (10 Apr 2023 12:00)  HR: 87 (11 Apr 2023 08:00) (71 - 101)  BP: 103/55 (11 Apr 2023 08:00) (103/54 - 135/82)  BP(mean): 70 (11 Apr 2023 08:00) (69 - 92)  RR: 18 (11 Apr 2023 08:00) (16 - 25)  SpO2: 100% (11 Apr 2023 08:00) (94% - 100%)    Parameters below as of 11 Apr 2023 08:00  Patient On (Oxygen Delivery Method): nasal cannula  O2 Flow (L/min): 3    Karnofsky:  40%    GEN: chronically ill. resting comfortably and no acute distress    HEENT: mmm    Lungs: comfortable, nonlabored     CV: +s1/s2 RRR    GI: +BS, abdomen obese, NTND    MSK: moves all 4 extremities, no cyanosis. + edema  +weakness    NEURO: nonfocal. awake and alert, oriented x 4, interactive    Skin: warm and dry.   +chronic BLE venous stasis changes      LABS:                          15.3   6.54  )-----------( 71       ( 11 Apr 2023 04:40 )             48.4     04-11    136  |  96  |  41.8<H>  ----------------------------<  126<H>  5.0   |  30.0<H>  |  0.96    Ca    9.2      11 Apr 2023 04:40  Mg     2.5     04-11       RADIOLOGY & ADDITIONAL STUDIES: Reviewed     ADVANCE DIRECTIVES/TREATMENT PREFERENCES: FULL CODE     NEUROLOGICAL MEDICATIONS/OPIOIDS/BENZODIAZEPINE IN PAST 24 HOURS: none

## 2023-04-11 NOTE — PROGRESS NOTE ADULT - ASSESSMENT
Acute on chronic hypoxemic, hypercapnic resp failure; over-compensated with alkalemia  COPD, OHS /CHALO /HFpEF   Severe PH with Cor pulmonale  Improved, on 3 L in chair  Needs nocturnal NIV, has ? BIPAP at home need to verify, currently compliant with NIV    Rec:    Continue nebs  On nocturnal Avaps, will adjust TV, currently not tolerating  wean medrol to prednisone  Optimize cardiac status, now on Entresto  Mild metabolic alkalosis, follow lytes, prn Diamox  On full AC  Ablation planned, will need NIV for procedure, resp status improving  OOB/ambulate as able  Prognosis guarded, favor BANDAR ? compliance  at home  Pulm f/u with Dr. Martinez as outpt   Acute on chronic hypoxemic, hypercapnic resp failure; over-compensated with alkalemia  COPD, OHS /CHALO /HFpEF   Severe PH with Cor pulmonale  Improved, on 3 L in chair  Needs nocturnal NIV, has ? BIPAP at home need to verify, currently compliant with NIV    Rec:    Continue nebs  On nocturnal Avaps, will adjust TV, currently not tolerating  wean medrol to prednisone  Optimize cardiac status, now on Entresto  Mild metabolic alkalosis, follow lytes, prn Diamox  On full AC, Thrombocytopenia appears chronic, ? ITP- mgmt per medical team, no overt bleeding  Ablation planned, will need NIV for procedure and ability to lie flat, resp status is  improving,   OOB/ambulate as able  Prognosis guarded, favor BANDAR ? compliance  at home  Pulm f/u with Dr. Martinez as outpt   Acute on chronic hypoxemic, hypercapnic resp failure; over-compensated with alkalemia  COPD, OHS /CHALO /HFpEF   Severe PH with Cor pulmonale  Improved, on 3 L in chair  Needs nocturnal NIV, has ? BIPAP at home need to verify, currently compliant with NIV    Rec:    Continue nebs  On nocturnal Avaps, will adjust TV, currently not tolerating  wean medrol to prednisone  Optimize cardiac status, now on Entresto, diuretics  Mild metabolic alkalosis, follow lytes, prn Diamox  On full AC, Thrombocytopenia appears chronic, ? ITP- mgmt per medical team, no overt bleeding  Ablation planned, will need NIV for procedure and ability to lie flat, resp status is  improving,   OOB/ambulate as able  repeat CXR  Prognosis guarded, favor BANDAR ? compliance  at home  Pulm f/u with Dr. Martinez as outpt   Acute on chronic hypoxemic, hypercapnic resp failure; over-compensated with alkalemia  COPD, OHS /CHALO /HFpEF   Severe PH with Cor pulmonale  Improved, on 3 L in chair  Needs nocturnal NIV, has ? BIPAP at home need to verify, currently compliant with NIV    Rec:    Continue nebs  On nocturnal Avaps, will adjust TV, currently not tolerating  wean medrol to prednisone  Optimize cardiac status, now on Entresto, diuretics  Mild metabolic alkalosis, follow lytes, prn Diamox  On full AC, Thrombocytopenia appears chronic, ? ITP- mgmt per medical team, no overt bleeding  Ablation planned, will need NIV for procedure and ability to lie flat, resp status is  improving,   OOB/ambulate as able  repeat CXR limited, but improvement noted  Prognosis guarded, favor BANDAR ? compliance  at home  Pulm f/u with Dr. Martinez as outpt   Acute on chronic hypoxemic, hypercapnic resp failure; over-compensated with alkalemia  COPD, OHS /CAHLO /HFpEF   Severe PH with Cor pulmonale  Improved, on 3 L in chair  Needs nocturnal NIV, has ? BIPAP at home need to verify, currently compliant with NIV    Rec:    Continue nebs  On nocturnal BIPAP 12/6 off AVAPS, compliance stressed  wean medrol to prednisone  Optimize cardiac status, now on Entresto, diuretics  Mild metabolic alkalosis, follow lytes, prn Diamox  On full AC, Thrombocytopenia appears chronic, ? ITP- mgmt per medical team, no overt bleeding  Ablation planned, will need NIV for procedure and ability to lie flat, resp status is  improving,   OOB/ambulate as able  repeat CXR limited, but improvement noted  Prognosis guarded, favor BANDAR ? compliance  at home  Pulm f/u with Dr. Martinez as outpt

## 2023-04-11 NOTE — PROGRESS NOTE ADULT - PROBLEM SELECTOR PLAN 2
Persistent w/ variable block which has improved off AVN blocking agent  AC: Heparin gtt  Appreciate EP recs - plan for possible GRZEGORZ/DCCV this week.  No availability for ablation this week.

## 2023-04-11 NOTE — PROGRESS NOTE ADULT - ASSESSMENT
83M with hx of HTN, DM2, HFpEF, COPD on home O2 (2L), chronic respiratory failure on nocturnal cpap (noncompliant with use), DVT on apixaban, multiple hospitalizations (also at [facility]) admitted for acute on chronic respiratory failure with hypoxia and hypercarbia secondary to acute decompensated heart failure and new onset atrial flutter. Palliative was consulted for support and to assist with goals of care.     Acute on Chronic Respiratory Failure with Hypoxia + Hypercarbia  - on home O2 NC 2L and nocturnal Cpap (noncompliant)  - etiology likely multifactorial: CHF, COPD, CHALO/OHS  - O2 supplement and nocturnal Bipap  - nebs ATC, O2 supplement    - will need close follow up with pulm on discharge and home NIV    Acute Decompensated HFpEF  - TTE with EF 55-60%, severe pulm HTN  - s/p IV lasix  - CHF team following, input appreciated, possible benefit of CardioMems  - cardio consult noted, input appreciated     Aflutter  - planned for GRZEGORZ +ablation vs DCCV this week pending pulm/CHF status per EP     Dysphagia  - seen by SLP yesterday, diet modified to regular mildly thick liquid  - aspiration precaution     Constipation  - still without BM, given suppository this morning and pt feels he may have BM soon  - on senna, miralax      Palliative Care Encounter  - HCP/surrogate: daughter Alma    - FULL CODE; pt/family will continue to have ongoing discussions on discharge   - GOC: continue pursuit of all active medical interventions.   - Overall guarded prognosis with high risk of rehospitalization given complex comorbidities and history of noncompliance with home CPAP with multiple other admissions for acute/chronic respiratory failure. Significant education provided to pt on the importance of medical compliance and close follow up with his outpatient physicians.   - Recommend advance illness program referral for additional supportive services on discharge.     No further recommendations from a palliative standpoint. Will sign off. Please reconsult PRN  Dispo planning per CCC. Ongoing medical mgnt per primary team.

## 2023-04-11 NOTE — PROGRESS NOTE ADULT - ASSESSMENT
- New onset atrial flutter with tachy/renetta syndrome   - Respiratory failure with CO2- improved but still with dyspnea while laying flat   - HFpEF -  much improved fluid status    Plan  Conservative approach with GRZEGORZ/cardioversion seems most appropriate at this point in his care.

## 2023-04-11 NOTE — CHART NOTE - NSCHARTNOTEFT_GEN_A_CORE
Palliative care social work note.    SW met with patient earlier today who actively engaged with SW in life reminisce. Patient understanding that his choices through life have led him to health issues at present. Patient receptive to support from palliative care team and is hopeful to go home. Patient reports no symptom issues.

## 2023-04-12 LAB
ANION GAP SERPL CALC-SCNC: 11 MMOL/L — SIGNIFICANT CHANGE UP (ref 5–17)
APTT BLD: 45.7 SEC — HIGH (ref 27.5–35.5)
BUN SERPL-MCNC: 48.8 MG/DL — HIGH (ref 8–20)
CALCIUM SERPL-MCNC: 9.4 MG/DL — SIGNIFICANT CHANGE UP (ref 8.4–10.5)
CHLORIDE SERPL-SCNC: 90 MMOL/L — LOW (ref 96–108)
CO2 SERPL-SCNC: 36 MMOL/L — HIGH (ref 22–29)
CREAT SERPL-MCNC: 1.04 MG/DL — SIGNIFICANT CHANGE UP (ref 0.5–1.3)
EGFR: 71 ML/MIN/1.73M2 — SIGNIFICANT CHANGE UP
GLUCOSE BLDC GLUCOMTR-MCNC: 109 MG/DL — HIGH (ref 70–99)
GLUCOSE BLDC GLUCOMTR-MCNC: 138 MG/DL — HIGH (ref 70–99)
GLUCOSE BLDC GLUCOMTR-MCNC: 160 MG/DL — HIGH (ref 70–99)
GLUCOSE BLDC GLUCOMTR-MCNC: 88 MG/DL — SIGNIFICANT CHANGE UP (ref 70–99)
GLUCOSE BLDC GLUCOMTR-MCNC: 92 MG/DL — SIGNIFICANT CHANGE UP (ref 70–99)
GLUCOSE SERPL-MCNC: 129 MG/DL — HIGH (ref 70–99)
HCT VFR BLD CALC: 48.2 % — SIGNIFICANT CHANGE UP (ref 39–50)
HGB BLD-MCNC: 15.2 G/DL — SIGNIFICANT CHANGE UP (ref 13–17)
MAGNESIUM SERPL-MCNC: 2.5 MG/DL — SIGNIFICANT CHANGE UP (ref 1.6–2.6)
MCHC RBC-ENTMCNC: 31.5 GM/DL — LOW (ref 32–36)
MCHC RBC-ENTMCNC: 33.9 PG — SIGNIFICANT CHANGE UP (ref 27–34)
MCV RBC AUTO: 107.3 FL — HIGH (ref 80–100)
PLATELET # BLD AUTO: 59 K/UL — LOW (ref 150–400)
POTASSIUM SERPL-MCNC: 5.1 MMOL/L — SIGNIFICANT CHANGE UP (ref 3.5–5.3)
POTASSIUM SERPL-SCNC: 5.1 MMOL/L — SIGNIFICANT CHANGE UP (ref 3.5–5.3)
RBC # BLD: 4.49 M/UL — SIGNIFICANT CHANGE UP (ref 4.2–5.8)
RBC # FLD: 13.9 % — SIGNIFICANT CHANGE UP (ref 10.3–14.5)
SARS-COV-2 RNA SPEC QL NAA+PROBE: SIGNIFICANT CHANGE UP
SODIUM SERPL-SCNC: 137 MMOL/L — SIGNIFICANT CHANGE UP (ref 135–145)
WBC # BLD: 7.39 K/UL — SIGNIFICANT CHANGE UP (ref 3.8–10.5)
WBC # FLD AUTO: 7.39 K/UL — SIGNIFICANT CHANGE UP (ref 3.8–10.5)

## 2023-04-12 PROCEDURE — 99233 SBSQ HOSP IP/OBS HIGH 50: CPT

## 2023-04-12 RX ORDER — APIXABAN 2.5 MG/1
5 TABLET, FILM COATED ORAL EVERY 12 HOURS
Refills: 0 | Status: ACTIVE | OUTPATIENT
Start: 2023-04-12 | End: 2024-03-10

## 2023-04-12 RX ADMIN — Medication 40 MILLIGRAM(S): at 18:29

## 2023-04-12 RX ADMIN — Medication 3 MILLILITER(S): at 21:31

## 2023-04-12 RX ADMIN — SPIRONOLACTONE 25 MILLIGRAM(S): 25 TABLET, FILM COATED ORAL at 05:17

## 2023-04-12 RX ADMIN — HEPARIN SODIUM 1000 UNIT(S)/HR: 5000 INJECTION INTRAVENOUS; SUBCUTANEOUS at 07:29

## 2023-04-12 RX ADMIN — Medication 1 SPRAY(S): at 13:21

## 2023-04-12 RX ADMIN — SACUBITRIL AND VALSARTAN 1 TABLET(S): 24; 26 TABLET, FILM COATED ORAL at 05:17

## 2023-04-12 RX ADMIN — Medication 1 SPRAY(S): at 21:12

## 2023-04-12 RX ADMIN — APIXABAN 5 MILLIGRAM(S): 2.5 TABLET, FILM COATED ORAL at 18:30

## 2023-04-12 RX ADMIN — Medication 20 MILLIGRAM(S): at 13:20

## 2023-04-12 RX ADMIN — Medication 3 MILLILITER(S): at 15:47

## 2023-04-12 RX ADMIN — Medication 3 MILLILITER(S): at 08:51

## 2023-04-12 RX ADMIN — HEPARIN SODIUM 1000 UNIT(S)/HR: 5000 INJECTION INTRAVENOUS; SUBCUTANEOUS at 07:26

## 2023-04-12 RX ADMIN — HEPARIN SODIUM 5000 UNIT(S): 5000 INJECTION INTRAVENOUS; SUBCUTANEOUS at 07:28

## 2023-04-12 RX ADMIN — POLYETHYLENE GLYCOL 3350 17 GRAM(S): 17 POWDER, FOR SOLUTION ORAL at 13:20

## 2023-04-12 RX ADMIN — Medication 1 SPRAY(S): at 05:18

## 2023-04-12 RX ADMIN — Medication 3 MILLILITER(S): at 04:04

## 2023-04-12 RX ADMIN — Medication 40 MILLIGRAM(S): at 05:17

## 2023-04-12 RX ADMIN — Medication 20 MILLIGRAM(S): at 05:18

## 2023-04-12 NOTE — DIETITIAN INITIAL EVALUATION ADULT - NS FNS DIET ORDER
Diet, DASH/TLC:   Sodium & Cholesterol Restricted  Consistent Carbohydrate {Evening Snack} (CSTCHOSN)  1500mL Fluid Restriction ([id number])  Mildly Thick Liquids (MILDTHICKLIQS) (04-10-23 @ 14:40)

## 2023-04-12 NOTE — DIETITIAN INITIAL EVALUATION ADULT - ORAL INTAKE PTA/DIET HISTORY
Pt states that his appetite and PO intake has been good PTA and since admission. UBW noted at ~300# about 6 mo ago; intentionally lost weight 2/2 cutting out sugar. Lives at home with daughter; daughter will be doing grocery shopping and preparing meals for pt. Per pt interview, pt "never adds salt to food", though eats a lot of canned and processed foods. Educated pt verbally on Low Sodium Diet. Pt of good understanding and appreciative of education. RD to remain available.

## 2023-04-12 NOTE — PROGRESS NOTE ADULT - SUBJECTIVE AND OBJECTIVE BOX
LAINE   ----------------------------------------  The patient was seen at bedside. Patient with atrial flutter and heart failure. Reported mild dyspnea. Denied chest pain.    Vital Signs Last 24 Hrs  T(C): 36.4 (12 Apr 2023 07:48), Max: 36.7 (11 Apr 2023 20:00)  T(F): 97.5 (12 Apr 2023 07:48), Max: 98.1 (11 Apr 2023 20:00)  HR: 90 (12 Apr 2023 08:57) (63 - 102)  BP: 113/61 (12 Apr 2023 08:00) (101/59 - 113/61)  BP(mean): 77 (12 Apr 2023 08:00) (64 - 79)  RR: 18 (12 Apr 2023 08:00) (15 - 20)  SpO2: 97% (12 Apr 2023 08:57) (93% - 100%)    Parameters below as of 12 Apr 2023 08:57  Patient On (Oxygen Delivery Method): nasal cannula,3L    CAPILLARY BLOOD GLUCOSE  POCT Blood Glucose.: 92 mg/dL (12 Apr 2023 08:38)  POCT Blood Glucose.: 159 mg/dL (11 Apr 2023 22:34)  POCT Blood Glucose.: 103 mg/dL (11 Apr 2023 17:32)  POCT Blood Glucose.: 140 mg/dL (11 Apr 2023 11:59)    PHYSICAL EXAMINATION:  ----------------------------------------  General appearance: No acute distress, Awake, Alert  HEENT: Normocephalic, Atraumatic, Conjunctiva clear, EOMI  Neck: Supple, No JVD, No tenderness  Lungs: Breath sound equal bilaterally, No wheezes, No rales  Cardiovascular: S1S2, Regular rhythm  Abdomen: Soft, Nontender, Nondistended, No guarding/rebound, Positive bowel sounds  Extremities: No clubbing, No cyanosis, No calf tenderness, Lower extremity edema  Neuro: Strength equal bilaterally, No tremors  Psychiatric: Appropriate mood, Normal affect    LABORATORY STUDIES:  ----------------------------------------             15.2   7.39  )-----------( 59       ( 12 Apr 2023 06:24 )             48.2     04-12    137  |  90<L>  |  48.8<H>  ----------------------------<  129<H>  5.1   |  36.0<H>  |  1.04    Ca    9.4      12 Apr 2023 06:24  Mg     2.5     04-12    PTT - ( 12 Apr 2023 06:24 )  PTT:45.7 sec    MEDICATIONS  (STANDING):  albuterol/ipratropium for Nebulization 3 milliLiter(s) Nebulizer every 6 hours  apixaban 5 milliGRAM(s) Oral every 12 hours  dextrose 5%. 1000 milliLiter(s) (100 mL/Hr) IV Continuous <Continuous>  dextrose 5%. 1000 milliLiter(s) (50 mL/Hr) IV Continuous <Continuous>  dextrose 50% Injectable 25 Gram(s) IV Push once  dextrose 50% Injectable 12.5 Gram(s) IV Push once  dextrose 50% Injectable 25 Gram(s) IV Push once  glucagon  Injectable 1 milliGRAM(s) IntraMuscular once  insulin lispro (ADMELOG) corrective regimen sliding scale   SubCutaneous three times a day before meals  insulin lispro (ADMELOG) corrective regimen sliding scale   SubCutaneous at bedtime  methylPREDNISolone sodium succinate Injectable 40 milliGRAM(s) IV Push every 12 hours  polyethylene glycol 3350 17 Gram(s) Oral daily  sacubitril 24 mG/valsartan 26 mG 1 Tablet(s) Oral two times a day  senna 2 Tablet(s) Oral at bedtime  sodium chloride 0.65% Nasal 1 Spray(s) Both Nostrils three times a day  spironolactone 25 milliGRAM(s) Oral daily  torsemide 20 milliGRAM(s) Oral two times a day    MEDICATIONS  (PRN):  albuterol/ipratropium for Nebulization 3 milliLiter(s) Nebulizer every 3 hours PRN Shortness of Breath and/or Wheezing  bisacodyl Suppository 10 milliGRAM(s) Rectal daily PRN Constipation  dextrose Oral Gel 15 Gram(s) Oral once PRN Blood Glucose LESS THAN 70 milliGRAM(s)/deciliter  melatonin 3 milliGRAM(s) Oral at bedtime PRN Insomnia  morphine  - Injectable 0.5 milliGRAM(s) IV Push every 6 hours PRN increased work of breathing      ASSESSMENT / PLAN:  ----------------------------------------  83M with a history of hypertension, diabetes, COPD on supplemental oxygen, heart failure, and deep venous thrombosis who presented with increased dyspnea and edema found to have atrial flutter and heart failure for which intravenous furosemide was initiated. Metoprolol was held due to bradycardia with improvement in the heart rate.    Acute on chronic hypoxic respiratory failure - Multifactorial with heart failure and COPD. Echocardiogram was also notable for severe pulmonary hypertension. Continue on supplemental oxygen as need with titration as tolerated. Continue with nocturnal Bipap.    COPD exacerbation - To transition from intravenous methylprednisolone to prednisone. On inhaled bronchodilator therapy.    Atrial flutter - Cardiology follow up noted. Pending cardioversion. Metoprolol previously held due to bradycardia.    Acute on chronic diastolic heart failure - Heart failure follow up noted, on torsemide and spironolactone. On sacubitril/valsartan. Previously treated with metolazone and intravenous furosemide. Monitoring urine output.     Diabetes - Insulin coverage, close monitoring of blood glucose levels.    Hypertension - Close blood pressure monitoring.    Thrombocytopenia - To continue monitoring platelet counts. Prior records from [facility] also noted the presence of thrombocytopenia.    Oral ulcer - Continue to monitor. No bleeding noted. Continue with oral care.

## 2023-04-12 NOTE — PROGRESS NOTE ADULT - PROBLEM SELECTOR PLAN 1
LVEF 55-60%, severe biatrial enlargement, moderate TR   Multiple recent HF related hospitalizations. Could be exacerbated by new onset aflutter/bradycardia.   Diuretics: continue torsemide to 20mg BID.  Once folye out will reduce to daily dose and start on Farixga 10mg daily.  GDMT: Continue Entresto 24-26mg BID and spironolactone 25 mg daily.   K elevated to 5.1 today.  Will need to monitor trend, if continues to increase will need to decrease spirolactone to 12.5mg daily.  Low Na+, 1.5L FR diet advised.  Please document strict I&O and daily standing weights.  Would likely benefit from CardioMEMs device to avoid recurrent hospitalizations if patient can prove compliance.    Now on home dose of NC.  His volume status is much improved since admission.   Pulm following for additional optimization.

## 2023-04-12 NOTE — PROGRESS NOTE ADULT - ASSESSMENT
Primary Cardiology: [facility]  HF Cardiologist: Dr. Carreon    82 y/o male, former smoker (quit 7/2022), with h/o chronic HFpEF ACC/AHA stage C, COPD on home O2 (4 LPM via NC) since July 2022, DM2, DVT on Eliquis who was referred from Dr. Booker's office due to ADHF.   Upon arrival to the ED he was found to have newly diagnosed Aflutter with variable block (VR 30-40s). His BP was stable and he denied symptoms associated to bradycardia. His labs were remarkable for elevated pBNP, normal trop, thrombocytopenia and elevated CO2. His CxR showed pulmonary congestion. He was given IV lasix, glucagon and calcium. His AV node blocker agents were held.   This is his 3rd or 4th hospitalization in the past 12 months. He has been admitted multiple times to [facility].     Now off continuous BiPAP and appears close to euvolemic on exam. His renal function has improved and hes normotensive.    Cardiac Studies:  TTE 4/5/23: LVEF 55-60%, LVIDd 5.47cm, severe RA and RV enlargement, trace MR, moderate TR, PASP 61.8 mmHg (RA 15 mmHg).

## 2023-04-12 NOTE — DIETITIAN INITIAL EVALUATION ADULT - PERTINENT LABORATORY DATA
04-12    137  |  90<L>  |  48.8<H>  ----------------------------<  129<H>  5.1   |  36.0<H>  |  1.04    Ca    9.4      12 Apr 2023 06:24  Mg     2.5     04-12    POCT Blood Glucose.: 138 mg/dL (04-12-23 @ 13:18)  A1C with Estimated Average Glucose Result: 5.9 % (04-06-23 @ 04:36)

## 2023-04-12 NOTE — DIETITIAN INITIAL EVALUATION ADULT - NSICDXPASTMEDICALHX_GEN_ALL_CORE_FT
PAST MEDICAL HISTORY:  BPH (benign prostatic hyperplasia)     Congestive heart failure (CHF)     COPD (chronic obstructive pulmonary disease)     DM (diabetes mellitus)     HTN (hypertension)

## 2023-04-12 NOTE — DIETITIAN INITIAL EVALUATION ADULT - ADD RECOMMEND
1) Rx MVI  2) Encourage PO and HBV protein intake  3) Monitor nutrition related labs  4) Monitor hydration status

## 2023-04-12 NOTE — PROGRESS NOTE ADULT - ASSESSMENT
84 y/o male with chronic HFpEF ACC/AHA stage C, recurrent CHF hospitalizations, COPD on home O2 (4 LPM via NC) since July 2022, former smoker (stopped 7/2022), DM2, DVT on Eliquis admitted with dyspnea, edema, weight gain and new atrial flutter.   1. Acute on chronic HFpEF, elevated BNP, trop neg x2, no evidence of ACS,   2. 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 COPD exacerbation, hypercapnea, (04/06/2023), pH 7.62 HCO3 52.4 Reported, today BMP HCO2 - 39  4. s/p TTE 4/5/23: LVEF 55-60%, LVIDd 5.47cm, severe RA and RV enlargement, trace MR, moderate TR, PASP 61.8 mmHg (RA 15 mmHg)      Plan:  Heart Failure consult appreciated.   Continue to avoid AV node blocking agents.   EP consultation appreciated  GRZEGORZ with afib cardioversion by [facility] EP  Continue torsemide.   Continue  entresto BID, hold for SBP <110   Eliquis held, patient is on bridge IV heparin.     Remains in atrial flutter. Respiratory status continues to improve.     Recommendations:  - No lab availability for ablation this week  - Plan for GRZEGORZ cardioversion prior to discharge once respiratory status has stabilized  - Resume eliquis and stop heparin gtt    Please call with any questions.

## 2023-04-12 NOTE — PROGRESS NOTE ADULT - ASSESSMENT
83 year old male former smoker (stopped 7/2022) with HTN, DM 2, HFpEF (LVEF: 55-60%), mod-severe pulmonary hypertension, BPH, COPD (on 4LNC since 7/2022), and DVT (on Apixaban) who presents with recurrent acute on chronic HFpEF and AFL with slow ventricular response for which EP is consulted.    #Typical AFL:  - GRZEGORZ/CV tomorrow  - Continue Apixaban 5m BID    #Bradycardia:  - Now resolved, no indication for pacemaker  - Avoid AVN blockers    #CHF:  - Appears close to euvolemia    #COPD:  - Appreciate pulmonary consultation  - Continue nebulizers    Shane Woodward MD  Clinical Cardiac Electrophysiology

## 2023-04-12 NOTE — PROGRESS NOTE ADULT - SUBJECTIVE AND OBJECTIVE BOX
S: Patient denies chest pain. Dyspnea improved    TELEMETRY: afl    MEDICATIONS  (STANDING):  albuterol/ipratropium for Nebulization 3 milliLiter(s) Nebulizer every 6 hours  dextrose 5%. 1000 milliLiter(s) (50 mL/Hr) IV Continuous <Continuous>  dextrose 5%. 1000 milliLiter(s) (100 mL/Hr) IV Continuous <Continuous>  dextrose 50% Injectable 25 Gram(s) IV Push once  dextrose 50% Injectable 12.5 Gram(s) IV Push once  dextrose 50% Injectable 25 Gram(s) IV Push once  glucagon  Injectable 1 milliGRAM(s) IntraMuscular once  heparin  Infusion. 2000 Unit(s)/Hr (20 mL/Hr) IV Continuous <Continuous>  insulin lispro (ADMELOG) corrective regimen sliding scale   SubCutaneous three times a day before meals  insulin lispro (ADMELOG) corrective regimen sliding scale   SubCutaneous at bedtime  methylPREDNISolone sodium succinate Injectable 40 milliGRAM(s) IV Push every 12 hours  polyethylene glycol 3350 17 Gram(s) Oral daily  sacubitril 24 mG/valsartan 26 mG 1 Tablet(s) Oral two times a day  senna 2 Tablet(s) Oral at bedtime  sodium chloride 0.65% Nasal 1 Spray(s) Both Nostrils three times a day  spironolactone 25 milliGRAM(s) Oral daily  torsemide 20 milliGRAM(s) Oral two times a day    MEDICATIONS  (PRN):  albuterol/ipratropium for Nebulization 3 milliLiter(s) Nebulizer every 3 hours PRN Shortness of Breath and/or Wheezing  bisacodyl Suppository 10 milliGRAM(s) Rectal daily PRN Constipation  dextrose Oral Gel 15 Gram(s) Oral once PRN Blood Glucose LESS THAN 70 milliGRAM(s)/deciliter  heparin   Injectable 73057 Unit(s) IV Push every 6 hours PRN For aPTT less than 40  heparin   Injectable 5000 Unit(s) IV Push every 6 hours PRN For aPTT between 40 - 57  melatonin 3 milliGRAM(s) Oral at bedtime PRN Insomnia  morphine  - Injectable 0.5 milliGRAM(s) IV Push every 6 hours PRN increased work of breathing        Vital Signs Last 24 Hrs  T(C): 36.6 (12 Apr 2023 04:00), Max: 36.7 (11 Apr 2023 20:00)  T(F): 97.9 (12 Apr 2023 04:00), Max: 98.1 (11 Apr 2023 20:00)  HR: 69 (12 Apr 2023 08:04) (63 - 109)  BP: 113/61 (12 Apr 2023 08:00) (101/59 - 113/61)  BP(mean): 77 (12 Apr 2023 08:00) (64 - 79)  RR: 18 (12 Apr 2023 08:00) (15 - 20)  SpO2: 100% (12 Apr 2023 08:00) (91% - 100%)    Parameters below as of 12 Apr 2023 08:00  Patient On (Oxygen Delivery Method): nasal cannula  O2 Flow (L/min): 3      Daily     Daily     I&O's Detail    11 Apr 2023 07:01  -  12 Apr 2023 07:00  --------------------------------------------------------  IN:    Heparin Infusion: 199 mL    Oral Fluid: 920 mL  Total IN: 1119 mL    OUT:    Voided (mL): 750 mL  Total OUT: 750 mL    Total NET: 369 mL      12 Apr 2023 07:01  -  12 Apr 2023 08:26  --------------------------------------------------------  IN:    Heparin Infusion: 20 mL  Total IN: 20 mL    OUT:  Total OUT: 0 mL    Total NET: 20 mL          PHYSICAL EXAM:  Appearance: In NAD  Neck: Obese  Cardiovascular: Normal S1 S2, irregular  Respiratory: Lungs clear to auscultation	  Gastrointestinal:  Soft, Non-tender, + BS, no bruits	  Neurologic: Grossly non-focal.  Extremities: + edema    LABS:                        15.2   7.39  )-----------( 59       ( 12 Apr 2023 06:24 )             48.2     04-12    137  |  90<L>  |  48.8<H>  ----------------------------<  129<H>  5.1   |  36.0<H>  |  1.04    Ca    9.4      12 Apr 2023 06:24  Mg     2.5     04-12          PTT - ( 12 Apr 2023 06:24 )  PTT:45.7 sec    I&O's Summary    11 Apr 2023 07:01  -  12 Apr 2023 07:00  --------------------------------------------------------  IN: 1119 mL / OUT: 750 mL / NET: 369 mL    12 Apr 2023 07:01  -  12 Apr 2023 08:27  --------------------------------------------------------  IN: 20 mL / OUT: 0 mL / NET: 20 mL      BNP

## 2023-04-12 NOTE — PROGRESS NOTE ADULT - SUBJECTIVE AND OBJECTIVE BOX
ADVANCED HEART FAILURE & TRANSPLANT  - PROGRESS NOTE  _______________________________________________________________________________________________________    Subjective:  - No acute overnight events.      Medications:  albuterol/ipratropium for Nebulization 3 milliLiter(s) Nebulizer every 6 hours  albuterol/ipratropium for Nebulization 3 milliLiter(s) Nebulizer every 3 hours PRN  bisacodyl Suppository 10 milliGRAM(s) Rectal daily PRN  dextrose 5%. 1000 milliLiter(s) IV Continuous <Continuous>  dextrose 5%. 1000 milliLiter(s) IV Continuous <Continuous>  dextrose 50% Injectable 25 Gram(s) IV Push once  dextrose 50% Injectable 12.5 Gram(s) IV Push once  dextrose 50% Injectable 25 Gram(s) IV Push once  dextrose Oral Gel 15 Gram(s) Oral once PRN  glucagon  Injectable 1 milliGRAM(s) IntraMuscular once  heparin   Injectable 18479 Unit(s) IV Push every 6 hours PRN  heparin   Injectable 5000 Unit(s) IV Push every 6 hours PRN  heparin  Infusion. 2000 Unit(s)/Hr IV Continuous <Continuous>  insulin lispro (ADMELOG) corrective regimen sliding scale   SubCutaneous three times a day before meals  insulin lispro (ADMELOG) corrective regimen sliding scale   SubCutaneous at bedtime  melatonin 3 milliGRAM(s) Oral at bedtime PRN  methylPREDNISolone sodium succinate Injectable 40 milliGRAM(s) IV Push every 12 hours  morphine  - Injectable 0.5 milliGRAM(s) IV Push every 6 hours PRN  polyethylene glycol 3350 17 Gram(s) Oral daily  sacubitril 24 mG/valsartan 26 mG 1 Tablet(s) Oral two times a day  senna 2 Tablet(s) Oral at bedtime  sodium chloride 0.65% Nasal 1 Spray(s) Both Nostrils three times a day      ICU Vital Signs Last 24 Hrs  T(C): 36.4, Max: 36.9 (04-09 @ 15:50)  HR: 99 (74 - 103)  BP: 113/50 (98/66 - 123/57)  BP(mean): 68 (67 - 83)  ABP: --  ABP(mean): --  RR: 22 (16 - 26)  SpO2: 99% (96% - 99%)        I&O's Summary Last 24 Hrs    IN: 253 mL / OUT: 0 mL / NET: 253 mL      Tele: Aflutter 100s, up to 130-150s    Physical Exam:    General: No distress. Comfortable.  Neck: JVP around 8cm sitting upright.  Respiratory: Bibasilar crackles  CV: Normal S1 and S2. No murmurs, rub, or gallops. Radial pulses normal.  Abdomen: Soft, non-distended, non-tender  Skin: No skin lesions  Extremities: cool B/L upper and lower extremities, 1+ BLE edema  Neurology: Non-focal, alert.    Labs:    ( 04-10-23 @ 05:02 )               14.9   6.12  )--------( 72                   46.5     ( 04-10-23 @ 05:02 )     136  |  90  |  39.6  ---------------------<  129  3.9  |  37.0  |  0.91    Ca 8.8  Phos x   Mg 2.4      PTT/PT/INR - ( 04-10-23 @ 05:02 )  PTT: 86.9 sec / PT: x     / INR: x        ABG - ( 04-10-23 @ 02:57 )  pH: 7.470 / pCO2: 64    / pO2: 84    / HCO3: 47    / Base Excess: 22.9  / SaO2: 98.0     Telemetry: atrial flutter HR 90s with HR up to 150s with movement.

## 2023-04-12 NOTE — DIETITIAN INITIAL EVALUATION ADULT - PERTINENT MEDS FT
MEDICATIONS  (STANDING):  albuterol/ipratropium for Nebulization 3 milliLiter(s) Nebulizer every 6 hours  apixaban 5 milliGRAM(s) Oral every 12 hours  dextrose 5%. 1000 milliLiter(s) (100 mL/Hr) IV Continuous <Continuous>  dextrose 5%. 1000 milliLiter(s) (50 mL/Hr) IV Continuous <Continuous>  dextrose 50% Injectable 25 Gram(s) IV Push once  dextrose 50% Injectable 12.5 Gram(s) IV Push once  dextrose 50% Injectable 25 Gram(s) IV Push once  glucagon  Injectable 1 milliGRAM(s) IntraMuscular once  insulin lispro (ADMELOG) corrective regimen sliding scale   SubCutaneous three times a day before meals  insulin lispro (ADMELOG) corrective regimen sliding scale   SubCutaneous at bedtime  polyethylene glycol 3350 17 Gram(s) Oral daily  predniSONE   Tablet 40 milliGRAM(s) Oral daily  sacubitril 24 mG/valsartan 26 mG 1 Tablet(s) Oral two times a day  senna 2 Tablet(s) Oral at bedtime  sodium chloride 0.65% Nasal 1 Spray(s) Both Nostrils three times a day  spironolactone 25 milliGRAM(s) Oral daily  torsemide 20 milliGRAM(s) Oral two times a day    MEDICATIONS  (PRN):  albuterol/ipratropium for Nebulization 3 milliLiter(s) Nebulizer every 3 hours PRN Shortness of Breath and/or Wheezing  bisacodyl Suppository 10 milliGRAM(s) Rectal daily PRN Constipation  dextrose Oral Gel 15 Gram(s) Oral once PRN Blood Glucose LESS THAN 70 milliGRAM(s)/deciliter  melatonin 3 milliGRAM(s) Oral at bedtime PRN Insomnia  morphine  - Injectable 0.5 milliGRAM(s) IV Push every 6 hours PRN increased work of breathing

## 2023-04-12 NOTE — PROGRESS NOTE ADULT - SUBJECTIVE AND OBJECTIVE BOX
Electrophysiology Attending Follow Up Note    Subjective: Feels well. Breathing is improving.    TELE: AFL rate controlled.    MEDICATIONS  (STANDING):  albuterol/ipratropium for Nebulization 3 milliLiter(s) Nebulizer every 6 hours  apixaban 5 milliGRAM(s) Oral every 12 hours  dextrose 5%. 1000 milliLiter(s) (50 mL/Hr) IV Continuous <Continuous>  dextrose 5%. 1000 milliLiter(s) (100 mL/Hr) IV Continuous <Continuous>  dextrose 50% Injectable 25 Gram(s) IV Push once  dextrose 50% Injectable 12.5 Gram(s) IV Push once  dextrose 50% Injectable 25 Gram(s) IV Push once  glucagon  Injectable 1 milliGRAM(s) IntraMuscular once  insulin lispro (ADMELOG) corrective regimen sliding scale   SubCutaneous three times a day before meals  insulin lispro (ADMELOG) corrective regimen sliding scale   SubCutaneous at bedtime  polyethylene glycol 3350 17 Gram(s) Oral daily  predniSONE   Tablet 40 milliGRAM(s) Oral daily  sacubitril 24 mG/valsartan 26 mG 1 Tablet(s) Oral two times a day  senna 2 Tablet(s) Oral at bedtime  sodium chloride 0.65% Nasal 1 Spray(s) Both Nostrils three times a day  spironolactone 25 milliGRAM(s) Oral daily  torsemide 20 milliGRAM(s) Oral two times a day    MEDICATIONS  (PRN):  albuterol/ipratropium for Nebulization 3 milliLiter(s) Nebulizer every 3 hours PRN Shortness of Breath and/or Wheezing  bisacodyl Suppository 10 milliGRAM(s) Rectal daily PRN Constipation  dextrose Oral Gel 15 Gram(s) Oral once PRN Blood Glucose LESS THAN 70 milliGRAM(s)/deciliter  melatonin 3 milliGRAM(s) Oral at bedtime PRN Insomnia  morphine  - Injectable 0.5 milliGRAM(s) IV Push every 6 hours PRN increased work of breathing    Allergies  No Known Allergies    Vital Signs Last 24 Hrs  T(C): 36.7 (12 Apr 2023 16:20), Max: 36.7 (11 Apr 2023 20:00)  T(F): 98 (12 Apr 2023 16:20), Max: 98.1 (11 Apr 2023 20:00)  HR: 103 (12 Apr 2023 16:00) (68 - 103)  BP: 114/97 (12 Apr 2023 16:00) (102/56 - 130/86)  BP(mean): 104 (12 Apr 2023 16:00) (64 - 104)  RR: 18 (12 Apr 2023 16:00) (17 - 20)  SpO2: 100% (12 Apr 2023 16:00) (93% - 100%)    Parameters below as of 12 Apr 2023 16:00  Patient On (Oxygen Delivery Method): nasal cannula  O2 Flow (L/min): 3    Physical Exam:  Constitutional: Well-developed, well nourished, in no acute distress  Head: normocephalic atraumatic   Eyes:  extraocular movements intact, sclera anicteric  ENT: mucous membranes moist, pharynx no erythema or swelling  Neck: supple, full range of motion, nontender to palpation midline  Chest wall: normal in appearance, nontender to palpation  Resp: effort normal, breath sounds clear to auscultation bilaterally  Cardiac: Heart regular rate and rhythm, no murmurs, rubs, or gallops.  No edema.  Abdomen: soft, nondistended, bowel sounds active, nontender to palpation in all four quadrants    Back: no costovertebral angle tenderness    Musculoskeletal: full range of motion all extremities with no pain, tenderness, swelling, or erythema    Neuro: Alert and oriented x 3, motor & sensation grossly in tact  Skin: color normal, no rashes or injury  Psych: mood calm    LABS:                        15.2   7.39  )-----------( 59       ( 12 Apr 2023 06:24 )             48.2     04-12    137  |  90<L>  |  48.8<H>  ----------------------------<  129<H>  5.1   |  36.0<H>  |  1.04    Ca    9.4      12 Apr 2023 06:24  Mg     2.5     04-12    PTT - ( 12 Apr 2023 06:24 )  PTT:45.7 sec    RADIOLOGY & ADDITIONAL TESTS:  < from: Xray Chest 1 View- PORTABLE-Routine (Xray Chest 1 View- PORTABLE-Routine in AM.) (04.10.23 @ 01:24) >  IMPRESSION:  Improving interstitial pulmonary edema.    Bibasilar linear atelectasis.    Possible trace right pleural effusion. Small left pleural effusion, not significantly changed. Electrophysiology Attending Follow Up Note    Subjective: Feels well. Breathing is improving.    TELE: AFL rate controlled.    MEDICATIONS  (STANDING):  albuterol/ipratropium for Nebulization 3 milliLiter(s) Nebulizer every 6 hours  apixaban 5 milliGRAM(s) Oral every 12 hours  dextrose 5%. 1000 milliLiter(s) (50 mL/Hr) IV Continuous <Continuous>  dextrose 5%. 1000 milliLiter(s) (100 mL/Hr) IV Continuous <Continuous>  dextrose 50% Injectable 25 Gram(s) IV Push once  dextrose 50% Injectable 12.5 Gram(s) IV Push once  dextrose 50% Injectable 25 Gram(s) IV Push once  glucagon  Injectable 1 milliGRAM(s) IntraMuscular once  insulin lispro (ADMELOG) corrective regimen sliding scale   SubCutaneous three times a day before meals  insulin lispro (ADMELOG) corrective regimen sliding scale   SubCutaneous at bedtime  polyethylene glycol 3350 17 Gram(s) Oral daily  predniSONE   Tablet 40 milliGRAM(s) Oral daily  sacubitril 24 mG/valsartan 26 mG 1 Tablet(s) Oral two times a day  senna 2 Tablet(s) Oral at bedtime  sodium chloride 0.65% Nasal 1 Spray(s) Both Nostrils three times a day  spironolactone 25 milliGRAM(s) Oral daily  torsemide 20 milliGRAM(s) Oral two times a day    MEDICATIONS  (PRN):  albuterol/ipratropium for Nebulization 3 milliLiter(s) Nebulizer every 3 hours PRN Shortness of Breath and/or Wheezing  bisacodyl Suppository 10 milliGRAM(s) Rectal daily PRN Constipation  dextrose Oral Gel 15 Gram(s) Oral once PRN Blood Glucose LESS THAN 70 milliGRAM(s)/deciliter  melatonin 3 milliGRAM(s) Oral at bedtime PRN Insomnia  morphine  - Injectable 0.5 milliGRAM(s) IV Push every 6 hours PRN increased work of breathing    Allergies  No Known Allergies    Vital Signs Last 24 Hrs  T(C): 36.7 (12 Apr 2023 16:20), Max: 36.7 (11 Apr 2023 20:00)  T(F): 98 (12 Apr 2023 16:20), Max: 98.1 (11 Apr 2023 20:00)  HR: 103 (12 Apr 2023 16:00) (68 - 103)  BP: 114/97 (12 Apr 2023 16:00) (102/56 - 130/86)  BP(mean): 104 (12 Apr 2023 16:00) (64 - 104)  RR: 18 (12 Apr 2023 16:00) (17 - 20)  SpO2: 100% (12 Apr 2023 16:00) (93% - 100%)    Parameters below as of 12 Apr 2023 16:00  Patient On (Oxygen Delivery Method): nasal cannula  O2 Flow (L/min): 3    Physical Exam:  Constitutional: Well-developed, well nourished, in no acute distress  Head: normocephalic atraumatic   Eyes:  extraocular movements intact, sclera anicteric  ENT: mucous membranes moist, pharynx no erythema or swelling  Neck: supple, full range of motion, nontender to palpation midline  Chest wall: normal in appearance, nontender to palpation  Resp: effort normal, bibasilar rale, breath sounds clear to auscultation bilaterally  Cardiac: Heart regular rate and rhythm, no murmurs, rubs, or gallops.  No significant edema.  Abdomen: soft, nondistended, bowel sounds active, nontender to palpation in all four quadrants    Musculoskeletal: full range of motion all extremities with no pain, tenderness, swelling, or erythema    Neuro: Alert and oriented x 3, motor & sensation grossly in tact  Skin: color normal, no rashes or injury  Psych: mood calm    LABS:                        15.2   7.39  )-----------( 59       ( 12 Apr 2023 06:24 )             48.2     04-12    137  |  90<L>  |  48.8<H>  ----------------------------<  129<H>  5.1   |  36.0<H>  |  1.04    Ca    9.4      12 Apr 2023 06:24  Mg     2.5     04-12    PTT - ( 12 Apr 2023 06:24 )  PTT:45.7 sec    RADIOLOGY & ADDITIONAL TESTS:  < from: Xray Chest 1 View- PORTABLE-Routine (Xray Chest 1 View- PORTABLE-Routine in AM.) (04.10.23 @ 01:24) >  IMPRESSION:  Improving interstitial pulmonary edema.    Bibasilar linear atelectasis.    Possible trace right pleural effusion. Small left pleural effusion, not significantly changed.

## 2023-04-13 ENCOUNTER — TRANSCRIPTION ENCOUNTER (OUTPATIENT)
Age: 83
End: 2023-04-13

## 2023-04-13 LAB
ANION GAP SERPL CALC-SCNC: 10 MMOL/L — SIGNIFICANT CHANGE UP (ref 5–17)
BUN SERPL-MCNC: 49.2 MG/DL — HIGH (ref 8–20)
CALCIUM SERPL-MCNC: 8.9 MG/DL — SIGNIFICANT CHANGE UP (ref 8.4–10.5)
CHLORIDE SERPL-SCNC: 94 MMOL/L — LOW (ref 96–108)
CO2 SERPL-SCNC: 36 MMOL/L — HIGH (ref 22–29)
CREAT SERPL-MCNC: 1 MG/DL — SIGNIFICANT CHANGE UP (ref 0.5–1.3)
EGFR: 75 ML/MIN/1.73M2 — SIGNIFICANT CHANGE UP
GLUCOSE BLDC GLUCOMTR-MCNC: 110 MG/DL — HIGH (ref 70–99)
GLUCOSE BLDC GLUCOMTR-MCNC: 122 MG/DL — HIGH (ref 70–99)
GLUCOSE BLDC GLUCOMTR-MCNC: 131 MG/DL — HIGH (ref 70–99)
GLUCOSE BLDC GLUCOMTR-MCNC: 156 MG/DL — HIGH (ref 70–99)
GLUCOSE BLDC GLUCOMTR-MCNC: 58 MG/DL — LOW (ref 70–99)
GLUCOSE SERPL-MCNC: 128 MG/DL — HIGH (ref 70–99)
HCT VFR BLD CALC: 46.1 % — SIGNIFICANT CHANGE UP (ref 39–50)
HGB BLD-MCNC: 14.4 G/DL — SIGNIFICANT CHANGE UP (ref 13–17)
MAGNESIUM SERPL-MCNC: 2.3 MG/DL — SIGNIFICANT CHANGE UP (ref 1.6–2.6)
MCHC RBC-ENTMCNC: 31.2 GM/DL — LOW (ref 32–36)
MCHC RBC-ENTMCNC: 33.2 PG — SIGNIFICANT CHANGE UP (ref 27–34)
MCV RBC AUTO: 106.2 FL — HIGH (ref 80–100)
PLATELET # BLD AUTO: 77 K/UL — LOW (ref 150–400)
POTASSIUM SERPL-MCNC: 4.7 MMOL/L — SIGNIFICANT CHANGE UP (ref 3.5–5.3)
POTASSIUM SERPL-SCNC: 4.7 MMOL/L — SIGNIFICANT CHANGE UP (ref 3.5–5.3)
RBC # BLD: 4.34 M/UL — SIGNIFICANT CHANGE UP (ref 4.2–5.8)
RBC # FLD: 13.8 % — SIGNIFICANT CHANGE UP (ref 10.3–14.5)
SODIUM SERPL-SCNC: 140 MMOL/L — SIGNIFICANT CHANGE UP (ref 135–145)
TROPONIN T SERPL-MCNC: 0.01 NG/ML — SIGNIFICANT CHANGE UP (ref 0–0.06)
WBC # BLD: 8.31 K/UL — SIGNIFICANT CHANGE UP (ref 3.8–10.5)
WBC # FLD AUTO: 8.31 K/UL — SIGNIFICANT CHANGE UP (ref 3.8–10.5)

## 2023-04-13 PROCEDURE — 93320 DOPPLER ECHO COMPLETE: CPT | Mod: 26

## 2023-04-13 PROCEDURE — 93325 DOPPLER ECHO COLOR FLOW MAPG: CPT | Mod: 26

## 2023-04-13 PROCEDURE — 99233 SBSQ HOSP IP/OBS HIGH 50: CPT

## 2023-04-13 PROCEDURE — 93010 ELECTROCARDIOGRAM REPORT: CPT | Mod: 76

## 2023-04-13 PROCEDURE — 93312 ECHO TRANSESOPHAGEAL: CPT | Mod: 26

## 2023-04-13 RX ORDER — DEXTROSE 50 % IN WATER 50 %
12.5 SYRINGE (ML) INTRAVENOUS ONCE
Refills: 0 | Status: COMPLETED | OUTPATIENT
Start: 2023-04-13 | End: 2023-04-13

## 2023-04-13 RX ORDER — DRONEDARONE 400 MG/1
400 TABLET, FILM COATED ORAL
Refills: 0 | Status: ACTIVE | OUTPATIENT
Start: 2023-04-13 | End: 2024-03-11

## 2023-04-13 RX ADMIN — Medication 1 SPRAY(S): at 06:22

## 2023-04-13 RX ADMIN — APIXABAN 5 MILLIGRAM(S): 2.5 TABLET, FILM COATED ORAL at 06:21

## 2023-04-13 RX ADMIN — Medication 3 MILLILITER(S): at 05:44

## 2023-04-13 RX ADMIN — SACUBITRIL AND VALSARTAN 1 TABLET(S): 24; 26 TABLET, FILM COATED ORAL at 18:00

## 2023-04-13 RX ADMIN — Medication 3 MILLILITER(S): at 09:00

## 2023-04-13 RX ADMIN — Medication 3 MILLILITER(S): at 22:26

## 2023-04-13 RX ADMIN — Medication 20 MILLIGRAM(S): at 06:21

## 2023-04-13 RX ADMIN — SPIRONOLACTONE 25 MILLIGRAM(S): 25 TABLET, FILM COATED ORAL at 06:21

## 2023-04-13 RX ADMIN — DRONEDARONE 400 MILLIGRAM(S): 400 TABLET, FILM COATED ORAL at 18:00

## 2023-04-13 RX ADMIN — APIXABAN 5 MILLIGRAM(S): 2.5 TABLET, FILM COATED ORAL at 18:00

## 2023-04-13 RX ADMIN — SACUBITRIL AND VALSARTAN 1 TABLET(S): 24; 26 TABLET, FILM COATED ORAL at 06:21

## 2023-04-13 RX ADMIN — Medication 12.5 MILLILITER(S): at 13:03

## 2023-04-13 RX ADMIN — Medication 40 MILLIGRAM(S): at 06:21

## 2023-04-13 RX ADMIN — Medication 20 MILLIGRAM(S): at 18:00

## 2023-04-13 NOTE — DISCHARGE NOTE PROVIDER - NSDCFUADDINST_GEN_ALL_CORE_FT
Follow up with Dr. Mendoza in 2 - 4 weeks. Our office will contact you in 3-5 days to schedule this appointment. Please call [phone] with questions or concerns. Follow up with Dr. Pang in 1-2 weeks.

## 2023-04-13 NOTE — PROGRESS NOTE ADULT - SUBJECTIVE AND OBJECTIVE BOX
Pt doing well s/p uncomplicated GRZEGORZ & DCCV (200J x 1). Denies any complaints post procedure.     Exam:   VSS, NAD, A&O x 3  Skin: no erythema/edema/blistering at defib pad sites.   Card: S1/S2, RRR, no m/g/r  Resp: lungs CTA b/l  Abd: S/NT/ND  Ext: no edema, distal pulses intact    Assessment:   83 year old male former smoker (stopped 7/2022) with HTN, DM 2, HFpEF (LVEF: 55-60%), mod-severe pulmonary hypertension, BPH, COPD (on 4LNC since 7/2022), and DVT (on Apixaban) who presents with recurrent acute on chronic HFpEF and AFL with slow ventricular response. Pt is now status post GRZEGORZ negative for TANIA thrombus and uncomplicated DCCV (200 J x 1) with restoration of sinus rhythm. Pt reports no complaints post prodecure.     Plan:   Observation on telemetry per post op protocol.    NPO until 15:30  Ambulate w/ assist once fully awake & back to baseline mental status w/ VSS.  Continue Eliquis 5mg BID. Importance of strict compliance with anticoagulation regimen reinforced with pt.   Avoid AVN blocking agents  Out patient follow up with Dr. Mendoza in 2-4 weeks to arrange AFL ablation Pt doing well s/p uncomplicated GRZEGORZ & DCCV (200J x 1). Denies any complaints post procedure.     Exam:   VSS, NAD, A&O x 3  Skin: no erythema/edema/blistering at defib pad sites.   Card: S1/S2, RRR, no m/g/r  Resp: lungs CTA b/l  Abd: S/NT/ND  Ext: no edema, distal pulses intact    GRZEGORZ: No TANIA thrombus. LVEF 40%    Assessment:   83 year old male former smoker (stopped 7/2022) with HTN, DM 2, HFpEF (LVEF: 55-60%), mod-severe pulmonary hypertension, BPH, COPD (on 4LNC since 7/2022), and DVT (on Apixaban) who presents with recurrent acute on chronic HFpEF and AFL with slow ventricular response. Pt is now status post GRZEGORZ negative for TANIA thrombus and uncomplicated DCCV (200 J x 1) with restoration of sinus rhythm. Pt reports no complaints post prodecure.     Plan:   Observation on telemetry per post op protocol.    NPO until 15:30  Ambulate w/ assist once fully awake & back to baseline mental status w/ VSS.  Continue Eliquis 5mg BID. Importance of strict compliance with anticoagulation regimen reinforced with pt.   Avoid AVN blocking agents  GDMT as per general cardiology  Out patient follow up with Dr. Mendoza in 2-4 weeks to arrange AFL ablation Pt doing well s/p uncomplicated GRZEGORZ & DCCV (200J x 1). Denies any complaints post procedure.     Exam:   VSS, NAD, A&O x 3  Skin: no erythema/edema/blistering at defib pad sites.   Card: S1/S2, RRR, no m/g/r  Resp: lungs CTA b/l  Abd: S/NT/ND  Ext: no edema, distal pulses intact    GRZEGORZ: No TANIA thrombus. LVEF 40%  EKG: Sinus rhythm with intact conduction. IVCD    Assessment:   83 year old male former smoker (stopped 7/2022) with HTN, DM 2, HFpEF (LVEF: 55-60%), mod-severe pulmonary hypertension, BPH, COPD (on 4LNC since 7/2022), and DVT (on Apixaban) who presents with recurrent acute on chronic HFpEF and AFL with slow ventricular response. Pt is now status post GRZEGORZ negative for TANIA thrombus and uncomplicated DCCV (200 J x 1) with restoration of sinus rhythm. Pt reports no complaints post prodecure.     Plan:   Observation on telemetry per post op protocol.    NPO until 15:30  Ambulate w/ assist once fully awake & back to baseline mental status w/ VSS.  Continue Eliquis 5mg BID. Importance of strict compliance with anticoagulation regimen reinforced with pt.   Avoid AVN blocking agents  GDMT as per general cardiology  Out patient follow up with Dr. Mendoza in 2-4 weeks to arrange AFL ablation Pt doing well s/p uncomplicated GRZEGORZ & DCCV (200J x 1). Denies any complaints post procedure.     Exam:   VSS, NAD, A&O x 3  Skin: no erythema/edema/blistering at defib pad sites.   Card: S1/S2, RRR, no m/g/r  Resp: lungs CTA b/l  Abd: S/NT/ND  Ext: no edema, distal pulses intact    GRZEGORZ: No TANIA thrombus. LVEF 40% (preliminary read)  EKG: Sinus rhythm with intact conduction. IVCD    Assessment:   83 year old male former smoker (stopped 7/2022) with HTN, DM 2, HFpEF (LVEF: 55-60%), mod-severe pulmonary hypertension, BPH, COPD (on 4LNC since 7/2022), and DVT (on Apixaban) who presents with recurrent acute on chronic HFpEF and AFL with slow ventricular response. Pt is now status post GRZEGORZ negative for TANIA thrombus and uncomplicated DCCV (200 J x 1) with restoration of sinus rhythm. Pt reports no complaints post prodecure.     Plan:   Observation on telemetry per post op protocol.    NPO until 15:30  Ambulate w/ assist once fully awake & back to baseline mental status w/ VSS.  Continue Eliquis 5mg BID. Importance of strict compliance with anticoagulation regimen reinforced with pt.   Avoid AVN blocking agents  GDMT as per general cardiology / HF team  Out patient follow up with Dr. Mendoza in 2-4 weeks to arrange AFL ablation  No further inpatient EP intervention required at this time  Call EP service with any questions or concerns

## 2023-04-13 NOTE — PROGRESS NOTE ADULT - SUBJECTIVE AND OBJECTIVE BOX
Patient is resting supine with no complaints.     Heart failure    Handoff    MEWS Score    Congestive heart failure (CHF)    COPD (chronic obstructive pulmonary disease)    DM (diabetes mellitus)    HTN (hypertension)    BPH (benign prostatic hyperplasia)    CHF exacerbation    Acute on chronic diastolic congestive heart failure    Bradycardia    Atrial flutter    Acute on chronic respiratory failure with hypoxia    COPD without exacerbation    HTN (hypertension)    T2DM (type 2 diabetes mellitus)    Acute on chronic respiratory failure with hypoxia and hypercapnia    Obesity hypoventilation syndrome    CHALO (obstructive sleep apnea)    S/P cholecystectomy    History of appendectomy    LEG SWELLING    New onset atrial flutter    Bradycardia    SysAdmin_VisitLink        albuterol/ipratropium for Nebulization 3 milliLiter(s) Nebulizer every 3 hours PRN  albuterol/ipratropium for Nebulization 3 milliLiter(s) Nebulizer every 6 hours  apixaban 5 milliGRAM(s) Oral every 12 hours  bisacodyl Suppository 10 milliGRAM(s) Rectal daily PRN  dextrose 5%. 1000 milliLiter(s) IV Continuous <Continuous>  dextrose 5%. 1000 milliLiter(s) IV Continuous <Continuous>  dextrose 50% Injectable 25 Gram(s) IV Push once  dextrose 50% Injectable 12.5 Gram(s) IV Push once  dextrose 50% Injectable 25 Gram(s) IV Push once  dextrose Oral Gel 15 Gram(s) Oral once PRN  glucagon  Injectable 1 milliGRAM(s) IntraMuscular once  insulin lispro (ADMELOG) corrective regimen sliding scale   SubCutaneous three times a day before meals  insulin lispro (ADMELOG) corrective regimen sliding scale   SubCutaneous at bedtime  melatonin 3 milliGRAM(s) Oral at bedtime PRN  morphine  - Injectable 0.5 milliGRAM(s) IV Push every 6 hours PRN  polyethylene glycol 3350 17 Gram(s) Oral daily  predniSONE   Tablet 40 milliGRAM(s) Oral daily  sacubitril 24 mG/valsartan 26 mG 1 Tablet(s) Oral two times a day  senna 2 Tablet(s) Oral at bedtime  sodium chloride 0.65% Nasal 1 Spray(s) Both Nostrils three times a day  spironolactone 25 milliGRAM(s) Oral daily  torsemide 20 milliGRAM(s) Oral two times a day      T(C): 36.3 (04-13-23 @ 08:03), Max: 36.9 (04-13-23 @ 00:00)  HR: 76 (04-13-23 @ 09:01) (66 - 107)  BP: 117/50 (04-13-23 @ 08:00) (101/64 - 131/47)  RR: 20 (04-13-23 @ 08:00) (17 - 24)  SpO2: 100% (04-13-23 @ 09:01) (95% - 100%)    04-12-23 @ 07:01  -  04-13-23 @ 07:00  --------------------------------------------------------  IN: 1390 mL / OUT: 500 mL / NET: 890 mL        Gen: no distress  CV: normal S1 and S2  Resp: Lungs CTA      04-13    140  |  94<L>  |  49.2<H>  ----------------------------<  128<H>  4.7   |  36.0<H>  |  1.00    Ca    8.9      13 Apr 2023 06:10  Mg     2.3     04-13                          14.4   8.31  )-----------( 77       ( 13 Apr 2023 06:10 )             46.1

## 2023-04-13 NOTE — PROGRESS NOTE ADULT - ASSESSMENT
Primary Cardiology: [facility]  HF Cardiologist: Dr. Carreon    82 y/o male, former smoker (quit 7/2022), with h/o chronic HFpEF ACC/AHA stage C, COPD on home O2 (4 LPM via NC) since July 2022, DM2, DVT on Eliquis who was referred from Dr. Booker's office due to ADHF.   Upon arrival to the ED he was found to have newly diagnosed Aflutter with variable block (VR 30-40s). His BP was stable and he denied symptoms associated to bradycardia. His labs were remarkable for elevated pBNP, normal trop, thrombocytopenia and elevated CO2. His CxR showed pulmonary congestion. He was given IV lasix, glucagon and calcium. His AV node blocker agents were held.   This is his 3rd or 4th hospitalization in the past 12 months. He has been admitted multiple times to [facility].     Now off continuous BiPAP with improving volume status and renal function.   Underwent GRZEGORZ/DCCV 4/13.      Cardiac Studies:  TTE 4/5/23: LVEF 55-60%, LVIDd 5.47cm, severe RA and RV enlargement, trace MR, moderate TR, PASP 61.8 mmHg (RA 15 mmHg).

## 2023-04-13 NOTE — PROGRESS NOTE ADULT - PROBLEM SELECTOR PLAN 1
LVEF 55-60%, severe biatrial enlargement, moderate TR   Multiple recent HF related hospitalizations. Could be exacerbated by new onset aflutter/bradycardia.   Diuretics: continue torsemide 20mg BID.  Once badillo out will reduce to daily dose and start on Farixga 10mg daily.  GDMT: Continue Entresto 24-26mg BID and spironolactone 25 mg daily.   Low Na+, 1.5L FR diet advised.  Please document strict I&O and daily standing weights.  Would likely benefit from CardioMEMs device to avoid recurrent hospitalizations if patient can prove compliance.    Now on home dose of NC.  His volume status is much improved since admission.  Pulm following for additional optimization.

## 2023-04-13 NOTE — DISCHARGE NOTE PROVIDER - NPI NUMBER (FOR SYSADMIN USE ONLY) :
[[id number]] [[id number]],[[id number]] [[id number]],[[id number]],[[id number]]

## 2023-04-13 NOTE — PROVIDER CONTACT NOTE (OTHER) - ASSESSMENT
monitor tech informed RN that patient has St elevation in some of his lead  pt has no chest pain no signs or symptoms sitting comfortably in the chair eating dinner     can tach up to 145 not sustaining   /75(90)

## 2023-04-13 NOTE — PROVIDER CONTACT NOTE (OTHER) - ACTION/TREATMENT ORDERED:
Pt to be kept on current settings. No changes made
no new orders at this time, patient is compensated for his respiratory needs and is stable.
stat ekg   PA came to bedside to assess patient

## 2023-04-13 NOTE — PROGRESS NOTE ADULT - SUBJECTIVE AND OBJECTIVE BOX
[facility] Advanced Heart Failure - Progress Note  [address] [place] [address]  Office Phone: [phone]/Fax: [phone]  Service/On Call Phone [phone]    Subjective/Objective: NAEO. S/p GRZEGORZ/DCCV this am.     Medications:  albuterol/ipratropium for Nebulization 3 milliLiter(s) Nebulizer every 3 hours PRN  albuterol/ipratropium for Nebulization 3 milliLiter(s) Nebulizer every 6 hours  apixaban 5 milliGRAM(s) Oral every 12 hours  bisacodyl Suppository 10 milliGRAM(s) Rectal daily PRN  dextrose 5%. 1000 milliLiter(s) IV Continuous <Continuous>  dextrose 5%. 1000 milliLiter(s) IV Continuous <Continuous>  dextrose 50% Injectable 25 Gram(s) IV Push once  dextrose 50% Injectable 12.5 Gram(s) IV Push once  dextrose 50% Injectable 25 Gram(s) IV Push once  dextrose Oral Gel 15 Gram(s) Oral once PRN  dronedarone 400 milliGRAM(s) Oral two times a day  glucagon  Injectable 1 milliGRAM(s) IntraMuscular once  insulin lispro (ADMELOG) corrective regimen sliding scale   SubCutaneous three times a day before meals  insulin lispro (ADMELOG) corrective regimen sliding scale   SubCutaneous at bedtime  melatonin 3 milliGRAM(s) Oral at bedtime PRN  morphine  - Injectable 0.5 milliGRAM(s) IV Push every 6 hours PRN  polyethylene glycol 3350 17 Gram(s) Oral daily  predniSONE   Tablet 40 milliGRAM(s) Oral daily  sacubitril 24 mG/valsartan 26 mG 1 Tablet(s) Oral two times a day  senna 2 Tablet(s) Oral at bedtime  sodium chloride 0.65% Nasal 1 Spray(s) Both Nostrils three times a day  spironolactone 25 milliGRAM(s) Oral daily  torsemide 20 milliGRAM(s) Oral two times a day    Vital Signs Last 24 Hours  T(C): 36.6 (23 @ 11:50), Max: 36.9 (23 @ 00:00)  HR: 91 (23 @ 14:05) (66 - 107)  BP: 104/64 (23 @ 14:05) (101/64 - 131/47)  RR: 21 (23 @ 13:50) (17 - 24)  SpO2: 95% (23 @ 14:05) (95% - 100%)    Weight in k.6 ( @ 16:00)    I&O's Summary  2023 07:01  -  2023 07:00  --------------------------------------------------------  IN: 1390 mL / OUT: 500 mL / NET: 890 mL    Tele: Aflutter    Physical Exam:  General: Asleep in bed laying flat.  HEENT: EOMs intact.  Neck: Neck supple. JVP not elevated.   Chest: Anterior CTA.  CV: Normal S1 and S2. No murmurs, rub, or gallops. Lukewarm peripherally.  PV: 1+ B/L LE edema.  Abdomen: Soft, obese  Skin: warm, dry.  Neurology: alert, non-focal.    Labs:                        14.4   8.31  )-----------( 77       ( 2023 06:10 )             46.1         140  |  94<L>  |  49.2<H>  ----------------------------<  128<H>  4.7   |  36.0<H>  |  1.00    Ca    8.9      2023 06:10  Mg     2.3     13      PTT - ( 2023 06:24 )  PTT:45.7 sec

## 2023-04-13 NOTE — PROGRESS NOTE ADULT - ASSESSMENT
- New onset atrial flutter with tachy/renetta syndrome - s/p successful GRZEGORZ/cardioversion   - Respiratory failure with CO2- improved    - HFpEF -  much improved fluid status    Plan  Continue Eliquis 5mg BID  Follow up in 2 weeks.     - New onset atrial flutter with tachy/renetta syndrome - s/p spontaneous conversion to sinus rhythm   - Respiratory failure with CO2- improved    - HFpEF -  much improved fluid status    Plan  Continue Eliquis 5mg BID  Started dronedarone 400mg BID  Follow up in 2 weeks.

## 2023-04-13 NOTE — PROGRESS NOTE ADULT - PROBLEM SELECTOR PLAN 2
Persistent w/ variable block which has improved off AVN blocking agent  Now s/p GRZEGORZ/DCCV 4/13  Would likely benefit from ablation as outpt  AC: Eliquis 5mg BID

## 2023-04-13 NOTE — PROVIDER CONTACT NOTE (OTHER) - SITUATION
pt is here for acute on chronic congestive heart failure
ABG drawn on cont' BIPAP patient.
patient on BIPAP, compensated now. PH 7.47  CO2 64   HCO3 46.6

## 2023-04-13 NOTE — PROGRESS NOTE ADULT - SUBJECTIVE AND OBJECTIVE BOX
Pt seen and examined in CCL holding room bed #5 in anticipation of GRZEGORZ / DCCV. Blood glucose 58 at time of arrival, pt asymptomatic. 1/2 amp d50 ordered STAT. Pt reports last PO intake was yesterday evening. Last Eliquis dose given this AM. Will continue to monitor pending procedure.

## 2023-04-13 NOTE — CHART NOTE - NSCHARTNOTEFT_GEN_A_CORE
Interval Hx: Received sign-out from daytime medicine PA to follow-up with EKG, ordered due to ST elevation noted on monitor. Patient is a 83 year old male with a PMH of hypertension, diabetes, COPD on supplemental oxygen, heart failure, and deep venous thrombosis, admitted for acute on chronic hypoxic respiratory failure due to heart failure and COPD, s/p cardioversion for atrial flutter today. Patient seen at bedside without any complaints. Denies chest pain, palpitations, SOB, dyspnea.     Vitals  T(C): 36.6 (04-13-23 @ 17:08), Max: 36.9 (04-13-23 @ 00:00)  HR: 110 (04-13-23 @ 18:00) (66 - 115)  BP: 125/75 (04-13-23 @ 18:00) (102/57 - 131/47)  RR: 18 (04-13-23 @ 18:00) (17 - 24)  SpO2: 98% (04-13-23 @ 18:00) (95% - 100%)    Physical Exam  CONSTITUTIONAL: Patient sitting in recliner chair at bedside, no apparent distress  RESP: No respiratory distress, no use of accessory muscles; CTA b/l, no WRR  CV: sinus tachycardia noted, +S1S2    Labs  Troponin pending    EKG - no acute changes, no noted ST segment elevations. Sinus tachycardia  noted.     A/P: 83 year old male with a PMH of hypertension, diabetes, COPD on supplemental oxygen, heart failure, and deep venous thrombosis, admitted for acute on chronic hypoxic respiratory failure due to heart failure and COPD, s/p cardioversion for atrial flutter today, seen as a follow-up from ST segment elevation noted on monitor. Sinus tachycardia, rest VSS.     R/O MI   -EKG without acute changes   -patient asymptomatic  -Troponin pending   -Discussed case with Dr. Booker, cardiologist from [facility].    Will continue to monitor   RN to notify provider with any changes in patient status.

## 2023-04-13 NOTE — DISCHARGE NOTE PROVIDER - PROVIDER TOKENS
PROVIDER:[TOKEN:[[id number]]] PROVIDER:[TOKEN:[[id number]],FOLLOWUP:[1 week]],PROVIDER:[TOKEN:[[id number]]],PROVIDER:[TOKEN:[[id number]]] PROVIDER:[TOKEN:[[id number]],FOLLOWUP:[1 week]],PROVIDER:[TOKEN:[[id number]]],PROVIDER:[TOKEN:[[id number]]],PROVIDER:[TOKEN:[[id number]]]

## 2023-04-13 NOTE — DISCHARGE NOTE PROVIDER - ATTENDING DISCHARGE PHYSICAL EXAMINATION:
Vital Signs Last 24 Hrs  T(C): 36.7 (17 Apr 2023 10:15), Max: 36.8 (16 Apr 2023 20:23)  T(F): 98.1 (17 Apr 2023 10:15), Max: 98.3 (16 Apr 2023 20:23)  HR: 85 (17 Apr 2023 10:15) (84 - 98)  BP: 100/60 (17 Apr 2023 10:15) (100/60 - 115/65)  BP(mean): --  RR: 18 (17 Apr 2023 10:15) (18 - 18)  SpO2: 93% (17 Apr 2023 10:15) (91% - 98%)    Parameters below as of 17 Apr 2023 10:15  Patient On (Oxygen Delivery Method): nasal cannula  O2 Flow (L/min): 3    GENERAL: NAD  HEENT: atraumatic   NECK: soft, supple  CHEST/LUNG: decreased sounds at bases, respirations unlabored on NC   HEART: S1S2+, Regular rate and rhythm  ABDOMEN: Soft, Nontender, Nondistended; Bowel sounds present  SKIN: warm, dry; BLE chronic skin changes   NEURO: Awake, alert; grossly non-focal   PSYCH: calm, cooperative

## 2023-04-13 NOTE — DISCHARGE NOTE PROVIDER - NSDCCPTREATMENT_GEN_ALL_CORE_FT
PRINCIPAL PROCEDURE  Procedure: Transesophageal echocardiography with cardioversion  Findings and Treatment: -Take each dose of your anticoagulation medication (blood thinner) exactly as prescribed.   -Resume your diet with soft foods first.  - Do not drive, operate heavy machinery or make important decisions for 24 hours following the procedure.  - You may resume all other activities the day after the procedure.  Call your doctor if:   -you develop a fever, cough up blood, have any chest pain, palpitations or difficulty breathing. You may take a throat lozenge if you develop a sore throat or try warm salt water gargle.   - your rapid heart rhythm returns.  - you have any questions or concerns regarding the procedure.  If you experience increased difficulty breathing or chest pain, or if you faint, have dizzy spells, or any other distressing symptom, please seek immediate medical attention.

## 2023-04-13 NOTE — DISCHARGE NOTE PROVIDER - NSDCHHNEEDSERVICE_GEN_ALL_CORE
Rehabilitation services Medication teaching and assessment/Observation and assessment/Rehabilitation services/Teaching and training

## 2023-04-13 NOTE — PROVIDER CONTACT NOTE (MEDICATION) - BACKGROUND
medication confirmation- Entresto, torsemide and dronedarone to all be given at the same time 1800 med pass

## 2023-04-13 NOTE — PROGRESS NOTE ADULT - SUBJECTIVE AND OBJECTIVE BOX
LAINE   ----------------------------------------  The patient was seen at bedside. Patient with atrial flutter. Offered no complaints. Denied chest pain or dyspnea.    Vital Signs Last 24 Hrs  T(C): 36.3 (13 Apr 2023 08:03), Max: 36.9 (13 Apr 2023 00:00)  T(F): 97.4 (13 Apr 2023 08:03), Max: 98.4 (13 Apr 2023 00:00)  HR: 94 (13 Apr 2023 10:00) (66 - 107)  BP: 102/57 (13 Apr 2023 10:00) (101/64 - 131/47)  BP(mean): 71 (13 Apr 2023 10:00) (70 - 104)  RR: 18 (13 Apr 2023 10:00) (17 - 24)  SpO2: 98% (13 Apr 2023 10:00) (95% - 100%)    Parameters below as of 13 Apr 2023 10:00  Patient On (Oxygen Delivery Method): nasal cannula  O2 Flow (L/min): 3    CAPILLARY BLOOD GLUCOSE  POCT Blood Glucose.: 156 mg/dL (13 Apr 2023 07:18)  POCT Blood Glucose.: 160 mg/dL (12 Apr 2023 21:08)  POCT Blood Glucose.: 88 mg/dL (12 Apr 2023 18:02)  POCT Blood Glucose.: 138 mg/dL (12 Apr 2023 13:18)    PHYSICAL EXAMINATION:  ----------------------------------------  General appearance: No acute distress, Awake, Alert  HEENT: Normocephalic, Atraumatic, Conjunctiva clear, EOMI  Neck: Supple, No JVD, No tenderness  Lungs: Breath sound equal bilaterally, No wheezes, No rales  Cardiovascular: S1S2, Regular rhythm  Abdomen: Soft, Nontender, Nondistended, No guarding/rebound, Positive bowel sounds  Extremities: No clubbing, No cyanosis, No calf tenderness, Lower extremity edema  Neuro: Strength equal bilaterally, No tremors  Psychiatric: Appropriate mood, Normal affect    LABORATORY STUDIES:  ----------------------------------------             14.4   8.31  )-----------( 77       ( 13 Apr 2023 06:10 )             46.1     04-13    140  |  94<L>  |  49.2<H>  ----------------------------<  128<H>  4.7   |  36.0<H>  |  1.00    Ca    8.9      13 Apr 2023 06:10  Mg     2.3     04-13    PTT - ( 12 Apr 2023 06:24 )  PTT:45.7 sec    MEDICATIONS  (STANDING):  albuterol/ipratropium for Nebulization 3 milliLiter(s) Nebulizer every 6 hours  apixaban 5 milliGRAM(s) Oral every 12 hours  dextrose 5%. 1000 milliLiter(s) (100 mL/Hr) IV Continuous <Continuous>  dextrose 5%. 1000 milliLiter(s) (50 mL/Hr) IV Continuous <Continuous>  dextrose 50% Injectable 25 Gram(s) IV Push once  dextrose 50% Injectable 12.5 Gram(s) IV Push once  dextrose 50% Injectable 25 Gram(s) IV Push once  dronedarone 400 milliGRAM(s) Oral two times a day  glucagon  Injectable 1 milliGRAM(s) IntraMuscular once  insulin lispro (ADMELOG) corrective regimen sliding scale   SubCutaneous three times a day before meals  insulin lispro (ADMELOG) corrective regimen sliding scale   SubCutaneous at bedtime  polyethylene glycol 3350 17 Gram(s) Oral daily  predniSONE   Tablet 40 milliGRAM(s) Oral daily  sacubitril 24 mG/valsartan 26 mG 1 Tablet(s) Oral two times a day  senna 2 Tablet(s) Oral at bedtime  sodium chloride 0.65% Nasal 1 Spray(s) Both Nostrils three times a day  spironolactone 25 milliGRAM(s) Oral daily  torsemide 20 milliGRAM(s) Oral two times a day    MEDICATIONS  (PRN):  albuterol/ipratropium for Nebulization 3 milliLiter(s) Nebulizer every 3 hours PRN Shortness of Breath and/or Wheezing  bisacodyl Suppository 10 milliGRAM(s) Rectal daily PRN Constipation  dextrose Oral Gel 15 Gram(s) Oral once PRN Blood Glucose LESS THAN 70 milliGRAM(s)/deciliter  melatonin 3 milliGRAM(s) Oral at bedtime PRN Insomnia  morphine  - Injectable 0.5 milliGRAM(s) IV Push every 6 hours PRN increased work of breathing      ASSESSMENT / PLAN:  ----------------------------------------  83M with a history of hypertension, diabetes, COPD on supplemental oxygen, heart failure, and deep venous thrombosis who presented with increased dyspnea and edema found to have atrial flutter and heart failure for which intravenous furosemide was initiated. Metoprolol was held due to bradycardia with improvement in the heart rate.    Acute on chronic hypoxic respiratory failure - Multifactorial with heart failure and COPD. Echocardiogram was also notable for severe pulmonary hypertension. Continue on supplemental oxygen as need with titration as tolerated. Continue with nocturnal Bipap.    COPD exacerbation - Transitioned from intravenous methylprednisolone to prednisone. On inhaled bronchodilator therapy.    Atrial flutter - Cardiology follow up noted. The patient spontaneously converted to sinus rhythm. Dronedarone was initiated.    Acute on chronic diastolic heart failure - Previously on intravenous furosemide with metolazone. Heart failure follow up noted, on torsemide and spironolactone. On sacubitril/valsartan. Monitoring urine output.     Diabetes - Insulin coverage, close monitoring of blood glucose levels.    Hypertension - Close blood pressure monitoring.    Thrombocytopenia - To continue monitoring platelet counts. Prior records from [facility] also noted the presence of thrombocytopenia.    Oral ulcer - Improved. Continue with oral care.    Discussed with the patient's Alma ([phone]) in regards to the patient's condition, test results, and plan of care. Questions answered.

## 2023-04-13 NOTE — DISCHARGE NOTE PROVIDER - CARE PROVIDER_API CALL
Mic Mendoza)  Cardiac Electrophysiology; Cardiovascular Disease; Internal Medicine  [address] [place] [address]  Phone: [phone]  Fax: [phone]  Follow Up Time:    Shorty Pang)  Cardiac Electrophysiology  [address] [place] [address]  Phone: [phone]  Fax: [phone]  Follow Up Time: 1 week    ROLLY NELSON  Pulmonary Diseases  [address] [facility] [address]  [place] [address]  Phone: [phone]  Fax: [phone]  Follow Up Time:     Yuri Arreaga)  Adv Heart Fail Trnsplnt Cardio; Cardiovascular Disease; Internal Medicine  [address] [place] [address]  Phone: [phone]  Fax: [phone]  Follow Up Time:    Shorty Pang)  Cardiac Electrophysiology  [address] [place] [address]  Phone: [phone]  Fax: [phone]  Follow Up Time: 1 week    ROLLY NELSON  Pulmonary Diseases  [address]  [place] [address]  Phone: [phone]  Fax: [phone]  Follow Up Time:     Yuri Arreaga)  Adv Heart Fail Trnsplnt Cardio; Cardiovascular Disease; Internal Medicine  [address] [place] [address]  Phone: [phone]  Fax: [phone]  Follow Up Time:     Christopher Cuevas)  Hematology; HospicePalliative Medicine; Medical Oncology  [address] [place] [address]  Phone: [phone]  Fax: [phone]  Follow Up Time:

## 2023-04-13 NOTE — DISCHARGE NOTE PROVIDER - NSDCMRMEDTOKEN_GEN_ALL_CORE_FT
Albuterol (Eqv-ProAir HFA) 90 mcg/inh inhalation aerosol: 2 puff(s) inhaled every 4 hours as needed for  shortness of breath and/or wheezing  apixaban 5 mg oral tablet: 1 tab(s) orally 2 times a day  glimepiride 1 mg oral tablet: 1 orally once a day  metoprolol succinate 100 mg oral tablet, extended release: 1 tab(s) orally once a day  potassium chloride 10 mEq oral capsule, extended release: 1 cap(s) orally once a day  tamsulosin 0.4 mg oral capsule: 1 orally once a day (at bedtime)   Albuterol (Eqv-ProAir HFA) 90 mcg/inh inhalation aerosol: 2 puff(s) inhaled every 4 hours as needed for  shortness of breath and/or wheezing  apixaban 5 mg oral tablet: 1 tab(s) orally 2 times a day  dronedarone 400 mg oral tablet: 1 tab(s) orally 2 times a day  glimepiride 1 mg oral tablet: 1 orally once a day  ipratropium-albuterol 0.5 mg-2.5 mg/3 mL inhalation solution: 3 milliliter(s) inhaled every 6 hours as needed for  shortness of breath and/or wheezing  predniSONE 20 mg oral tablet: 1 tab(s) orally once a day start 4/18  sacubitril-valsartan 24 mg-26 mg oral tablet: 1 tab(s) orally 2 times a day  senna leaf extract oral tablet: 2 tab(s) orally once a day (at bedtime) hold for loose stool  spironolactone 25 mg oral tablet: 1 tab(s) orally once a day  tamsulosin 0.4 mg oral capsule: 1 orally once a day (at bedtime)  torsemide 20 mg oral tablet: 1 tab(s) orally once a day

## 2023-04-13 NOTE — DISCHARGE NOTE PROVIDER - CARE PROVIDERS DIRECT ADDRESSES
,DirectAddress_Unknown ,DirectAddress_Unknown,DirectAddress_Unknown,DirectAddress_Unknown ,DirectAddress_Unknown,DirectAddress_Unknown,DirectAddress_Unknown,[email or web address]

## 2023-04-13 NOTE — DISCHARGE NOTE PROVIDER - HOSPITAL COURSE
83y/oM PMH HTN, DM, COPD on home O2 3LNC, CHF, DVT on Eliquis presenting with increased dyspnea, edema, found to have atrial flutter and heart failure exacerbation. Seen by HF and cardiology teams. TTE with LVEF 55-60%, severe pHTN. weaned to baseline supplemental O2 (3LNC). s/p IV lasix, metolazone, now on PO torsemide, spironolactone, entresto. s/p IV steroids, now on PO prednisone, to complete taper on dc. Course complicated by aflutter with slow ventricular response, s/p cardioversion 4/13. cont dronedarone. pt to f/u Dr. Pang outpt 1-2weeks. Course also complicated by BRANDEN, improved. Pt to dc home

## 2023-04-13 NOTE — CHART NOTE - NSCHARTNOTEFT_GEN_A_CORE
Called for ST elevations noted on tele, patient cardioverted earlier this AM.   Patient asymptomatic, HR sustaining 100-120s NSR, appears to have J-point elevations on telemetry.   Resting comfortably in bed in NAD.   STAT ECG ordered, STAT troponin ordered.   D/w EP team, recommend cardiology follow up.   Night PA to f/u ECG and troponin.   RN to continue to monitor, will alert provider w/ any change in patient status.

## 2023-04-13 NOTE — DISCHARGE NOTE PROVIDER - NSDCCPCAREPLAN_GEN_ALL_CORE_FT
PRINCIPAL DISCHARGE DIAGNOSIS  Diagnosis: CHF exacerbation  Assessment and Plan of Treatment: continue new medications as prescribed. follow up with heart failure team      SECONDARY DISCHARGE DIAGNOSES  Diagnosis: COPD without exacerbation  Assessment and Plan of Treatment: continue prednisone taper    Diagnosis: CHALO (obstructive sleep apnea)  Assessment and Plan of Treatment: continue nocturnal cpap as prescribed    Diagnosis: New onset atrial flutter  Assessment and Plan of Treatment: s/p cardioversion 4/13. follow up with Dr. Pang    Diagnosis: Bradycardia  Assessment and Plan of Treatment:      PRINCIPAL DISCHARGE DIAGNOSIS  Diagnosis: CHF exacerbation  Assessment and Plan of Treatment: continue new medications as prescribed. follow up with heart failure team      SECONDARY DISCHARGE DIAGNOSES  Diagnosis: COPD without exacerbation  Assessment and Plan of Treatment: continue prednisone taper    Diagnosis: CHALO (obstructive sleep apnea)  Assessment and Plan of Treatment: continue nocturnal cpap as prescribed    Diagnosis: New onset atrial flutter  Assessment and Plan of Treatment: s/p cardioversion 4/13. follow up with Dr. Pang    Diagnosis: Bradycardia  Assessment and Plan of Treatment:     Diagnosis: Thrombocytopenia  Assessment and Plan of Treatment: follow up with hematology/oncology for monitoring/further work up

## 2023-04-14 LAB
ANION GAP SERPL CALC-SCNC: 16 MMOL/L — SIGNIFICANT CHANGE UP (ref 5–17)
BUN SERPL-MCNC: 53.8 MG/DL — HIGH (ref 8–20)
CALCIUM SERPL-MCNC: 9.4 MG/DL — SIGNIFICANT CHANGE UP (ref 8.4–10.5)
CHLORIDE SERPL-SCNC: 91 MMOL/L — LOW (ref 96–108)
CO2 SERPL-SCNC: 36 MMOL/L — HIGH (ref 22–29)
CREAT SERPL-MCNC: 1.33 MG/DL — HIGH (ref 0.5–1.3)
EGFR: 53 ML/MIN/1.73M2 — LOW
GLUCOSE BLDC GLUCOMTR-MCNC: 121 MG/DL — HIGH (ref 70–99)
GLUCOSE BLDC GLUCOMTR-MCNC: 166 MG/DL — HIGH (ref 70–99)
GLUCOSE BLDC GLUCOMTR-MCNC: 337 MG/DL — HIGH (ref 70–99)
GLUCOSE BLDC GLUCOMTR-MCNC: 98 MG/DL — SIGNIFICANT CHANGE UP (ref 70–99)
GLUCOSE SERPL-MCNC: 117 MG/DL — HIGH (ref 70–99)
HCT VFR BLD CALC: 49.6 % — SIGNIFICANT CHANGE UP (ref 39–50)
HGB BLD-MCNC: 15.5 G/DL — SIGNIFICANT CHANGE UP (ref 13–17)
MAGNESIUM SERPL-MCNC: 2.4 MG/DL — SIGNIFICANT CHANGE UP (ref 1.6–2.6)
MCHC RBC-ENTMCNC: 31.3 GM/DL — LOW (ref 32–36)
MCHC RBC-ENTMCNC: 33.1 PG — SIGNIFICANT CHANGE UP (ref 27–34)
MCV RBC AUTO: 106 FL — HIGH (ref 80–100)
PLATELET # BLD AUTO: 111 K/UL — LOW (ref 150–400)
POTASSIUM SERPL-MCNC: 4 MMOL/L — SIGNIFICANT CHANGE UP (ref 3.5–5.3)
POTASSIUM SERPL-SCNC: 4 MMOL/L — SIGNIFICANT CHANGE UP (ref 3.5–5.3)
RBC # BLD: 4.68 M/UL — SIGNIFICANT CHANGE UP (ref 4.2–5.8)
RBC # FLD: 14.1 % — SIGNIFICANT CHANGE UP (ref 10.3–14.5)
SODIUM SERPL-SCNC: 143 MMOL/L — SIGNIFICANT CHANGE UP (ref 135–145)
TROPONIN T SERPL-MCNC: 0.03 NG/ML — SIGNIFICANT CHANGE UP (ref 0–0.06)
WBC # BLD: 12.31 K/UL — HIGH (ref 3.8–10.5)
WBC # FLD AUTO: 12.31 K/UL — HIGH (ref 3.8–10.5)

## 2023-04-14 PROCEDURE — 74230 X-RAY XM SWLNG FUNCJ C+: CPT | Mod: 26

## 2023-04-14 PROCEDURE — 99233 SBSQ HOSP IP/OBS HIGH 50: CPT

## 2023-04-14 RX ADMIN — DRONEDARONE 400 MILLIGRAM(S): 400 TABLET, FILM COATED ORAL at 05:20

## 2023-04-14 RX ADMIN — Medication 3 MILLILITER(S): at 14:02

## 2023-04-14 RX ADMIN — Medication 1 SPRAY(S): at 12:46

## 2023-04-14 RX ADMIN — SACUBITRIL AND VALSARTAN 1 TABLET(S): 24; 26 TABLET, FILM COATED ORAL at 17:20

## 2023-04-14 RX ADMIN — APIXABAN 5 MILLIGRAM(S): 2.5 TABLET, FILM COATED ORAL at 05:20

## 2023-04-14 RX ADMIN — POLYETHYLENE GLYCOL 3350 17 GRAM(S): 17 POWDER, FOR SOLUTION ORAL at 12:47

## 2023-04-14 RX ADMIN — SPIRONOLACTONE 25 MILLIGRAM(S): 25 TABLET, FILM COATED ORAL at 05:20

## 2023-04-14 RX ADMIN — SENNA PLUS 2 TABLET(S): 8.6 TABLET ORAL at 21:41

## 2023-04-14 RX ADMIN — Medication 4: at 17:20

## 2023-04-14 RX ADMIN — Medication 1 SPRAY(S): at 05:20

## 2023-04-14 RX ADMIN — Medication 1: at 12:47

## 2023-04-14 RX ADMIN — Medication 20 MILLIGRAM(S): at 05:20

## 2023-04-14 RX ADMIN — Medication 3 MILLILITER(S): at 08:48

## 2023-04-14 RX ADMIN — DRONEDARONE 400 MILLIGRAM(S): 400 TABLET, FILM COATED ORAL at 17:21

## 2023-04-14 RX ADMIN — Medication 3 MILLILITER(S): at 21:05

## 2023-04-14 RX ADMIN — APIXABAN 5 MILLIGRAM(S): 2.5 TABLET, FILM COATED ORAL at 17:20

## 2023-04-14 RX ADMIN — Medication 40 MILLIGRAM(S): at 05:20

## 2023-04-14 NOTE — PROGRESS NOTE ADULT - PROBLEM SELECTOR PLAN 2
Persistent w/ variable block which has improved off AVN blocking agent  Now s/p GRZEGORZ/DCCV 4/13  Initiated on Multaq per EP  Would likely benefit from ablation as outpt - pt to f/u with Dr. Mendoza   AC: Eliquis 5mg BID Persistent w/ variable block which has improved off AVN blocking agent  Now s/p GRZEGORZ/DCCV 4/13  Initiated on Multaq per EP - should follow renal function closely  Having intermittent episodes of ST. May need to consider low dose BB.  Would likely benefit from ablation as outpt - pt to f/u with Dr. Mendoza   AC: Eliquis 5mg BID

## 2023-04-14 NOTE — SWALLOW VFSS/MBS ASSESSMENT ADULT - ORAL PHASE
within functional limits/Uncontrolled bolus / spillover in kimmy-pharynx/Uncontrolled bolus / spillover in hypopharynx within functional limits/Uncontrolled bolus / spillover in kimmy-pharynx Within functional limits/Uncontrolled bolus / spillover in kimmy-pharynx

## 2023-04-14 NOTE — PROGRESS NOTE ADULT - ASSESSMENT
Primary Cardiology: [facility]  HF Cardiologist: Dr. Carreon    84 y/o male, former smoker (quit 7/2022), with h/o chronic HFpEF ACC/AHA stage C, COPD on home O2 (4 LPM via NC) since July 2022, DM2, DVT on Eliquis who was referred from Dr. Booker's office due to ADHF.   Upon arrival to the ED he was found to have newly diagnosed Aflutter with variable block (VR 30-40s). His BP was stable and he denied symptoms associated to bradycardia. His labs were remarkable for elevated pBNP, normal trop, thrombocytopenia and elevated CO2. His CxR showed pulmonary congestion. He was given IV lasix, glucagon and calcium. His AV node blocker agents were held.   This is his 3rd or 4th hospitalization in the past 12 months. He has been admitted multiple times to [facility].     Now off continuous BiPAP with improving volume status and renal function.   Underwent GRZEGORZ/DCCV 4/13.  Started on Mutaq per EP.     Cardiac Studies:  TTE 4/5/23: LVEF 55-60%, LVIDd 5.47cm, severe RA and RV enlargement, trace MR, moderate TR, PASP 61.8 mmHg (RA 15 mmHg). Primary Cardiology: [facility]  HF Cardiologist: Dr. Carreon    84 y/o male, former smoker (quit 7/2022), with h/o chronic HFpEF ACC/AHA stage C, COPD on home O2 (4 LPM via NC) since July 2022, DM2, DVT on Eliquis who was referred from Dr. Booker's office due to ADHF.   Upon arrival to the ED he was found to have newly diagnosed Aflutter with variable block (VR 30-40s). His BP was stable and he denied symptoms associated to bradycardia. His labs were remarkable for elevated pBNP, normal trop, thrombocytopenia and elevated CO2. His CxR showed pulmonary congestion. He was given IV lasix, glucagon and calcium. His AV node blocker agents were held.   This is his 3rd or 4th hospitalization in the past 12 months. He has been admitted multiple times to [facility].     Now off continuous BiPAP with improving volume status and renal function.   Underwent GRZEGORZ/DCCV 4/13.  Started on Mutaq per EP.     Cardiac Studies:  TTE 4/5/23: LVEF 55-60%, LVIDd 5.47cm, severe RA and RV enlargement, trace MR, moderate TR, PASP 61.8 mmHg (RA 15 mmHg).

## 2023-04-14 NOTE — PROGRESS NOTE ADULT - ASSESSMENT
82 y/o male with chronic HFpEF ACC/AHA stage C, recurrent CHF hospitalizations, COPD on home O2 (4 LPM via NC) since July 2022, former smoker (stopped 7/2022), DM2, DVT on Eliquis admitted with dyspnea, edema, weight gain and new atrial flutter.   1. Acute on chronic HFpEF, elevated BNP, trop neg x2, no evidence of ACS, off of BIPAP  2. newly diagnosed Aflutter with variable block (VR 30-40s) with tachy renetta syndrome s/p GRZEGORZ/DCCV 4/13.  Started on Mutaq per EP.    3. COPD exacerbation, hypercapnea, (04/06/2023), pH 7.62 HCO3 52.4 Reported, today BMP HCO2 - 39  4. s/p TTE 4/5/23: LVEF 55-60%, LVIDd 5.47cm, severe RA and RV enlargement, trace MR, moderate TR, PASP 61.8 mmHg (RA 15 mmHg)        Plan:  Heart Failure consult appreciated.   EP consultation appreciated  Continue  entresto BID, hold for SBP <110   Continue Eliquis

## 2023-04-14 NOTE — SWALLOW VFSS/MBS ASSESSMENT ADULT - SLP GENERAL OBSERVATIONS
Pt recd awake/upright in stretcher in radiology, A&A Ox3, reduced cognition, verbose, 0/10 pain pre/post, tolerating 2L NC SPO2 >92%, accompanied by RN Concha on monitor

## 2023-04-14 NOTE — PROGRESS NOTE ADULT - SUBJECTIVE AND OBJECTIVE BOX
[facility] Advanced Heart Failure - Progress Note  [address] [place] [address]  Office Phone: [phone]/Fax: [phone]  Service/On Call Phone [phone]    Subjective/Objective:    Medications:  albuterol/ipratropium for Nebulization 3 milliLiter(s) Nebulizer every 3 hours PRN  albuterol/ipratropium for Nebulization 3 milliLiter(s) Nebulizer every 6 hours  apixaban 5 milliGRAM(s) Oral every 12 hours  bisacodyl Suppository 10 milliGRAM(s) Rectal daily PRN  dextrose 5%. 1000 milliLiter(s) IV Continuous <Continuous>  dextrose 5%. 1000 milliLiter(s) IV Continuous <Continuous>  dextrose 50% Injectable 25 Gram(s) IV Push once  dextrose 50% Injectable 12.5 Gram(s) IV Push once  dextrose 50% Injectable 25 Gram(s) IV Push once  dextrose Oral Gel 15 Gram(s) Oral once PRN  dronedarone 400 milliGRAM(s) Oral two times a day  glucagon  Injectable 1 milliGRAM(s) IntraMuscular once  insulin lispro (ADMELOG) corrective regimen sliding scale   SubCutaneous three times a day before meals  insulin lispro (ADMELOG) corrective regimen sliding scale   SubCutaneous at bedtime  melatonin 3 milliGRAM(s) Oral at bedtime PRN  polyethylene glycol 3350 17 Gram(s) Oral daily  predniSONE   Tablet 40 milliGRAM(s) Oral daily  sacubitril 24 mG/valsartan 26 mG 1 Tablet(s) Oral two times a day  senna 2 Tablet(s) Oral at bedtime  sodium chloride 0.65% Nasal 1 Spray(s) Both Nostrils three times a day  spironolactone 25 milliGRAM(s) Oral daily    Vital Signs Last 24 Hours  T(C): 36.4 (04-14-23 @ 08:06), Max: 36.7 (04-13-23 @ 20:00)  HR: 122 (04-14-23 @ 09:17) (91 - 123)  BP: 108/66 (04-14-23 @ 06:00) (104/64 - 125/75)  RR: 20 (04-14-23 @ 06:00) (17 - 22)  SpO2: 97% (04-14-23 @ 09:17) (95% - 100%)    I&O's Summary   13 Apr 2023 07:01  -  14 Apr 2023 07:00  --------------------------------------------------------  IN: 790 mL / OUT: 0 mL / NET: 790 mL    14 Apr 2023 07:01  -  14 Apr 2023 12:11  --------------------------------------------------------  IN: 300 mL / OUT: 0 mL / NET: 300 mL    Tele: SR/ST    Physical Exam:  General: In no distress.   HEENT: EOMs intact.  Neck: Neck supple. JVP not elevated.   Chest: Clear to auscultation bilaterally.  CV: RRR. Normal S1 and S2. No murmurs, rub, or gallops. Warm peripherally.  PV: No LE edema noted. Pulses full/equal in all four extremities.  Abdomen: Soft, non-distended, non-tender.  Skin: warm, dry, no rash.  Neurology: Alert and oriented times three. Sensation intact.  Psych: Appropriate affect.    Labs:                        15.5   12.31 )-----------( 111      ( 14 Apr 2023 03:39 )             49.6     04-14    143  |  91<L>  |  53.8<H>  ----------------------------<  117<H>  4.0   |  36.0<H>  |  1.33<H>    Ca    9.4      14 Apr 2023 03:39  Mg     2.4     04-14        CARDIAC MARKERS ( 14 Apr 2023 03:39 )  x     / 0.03 ng/mL / x     / x     / x      CARDIAC MARKERS ( 13 Apr 2023 21:30 )  x     / 0.01 ng/mL / x     / x     / x                       [facility] Advanced Heart Failure - Progress Note  [address] [place] [address]  Office Phone: [phone]/Fax: [phone]  Service/On Call Phone [phone]    Subjective/Objective: NAEO. Pt denies overt HF symptoms.     Medications:  albuterol/ipratropium for Nebulization 3 milliLiter(s) Nebulizer every 3 hours PRN  albuterol/ipratropium for Nebulization 3 milliLiter(s) Nebulizer every 6 hours  apixaban 5 milliGRAM(s) Oral every 12 hours  bisacodyl Suppository 10 milliGRAM(s) Rectal daily PRN  dextrose 5%. 1000 milliLiter(s) IV Continuous <Continuous>  dextrose 5%. 1000 milliLiter(s) IV Continuous <Continuous>  dextrose 50% Injectable 25 Gram(s) IV Push once  dextrose 50% Injectable 12.5 Gram(s) IV Push once  dextrose 50% Injectable 25 Gram(s) IV Push once  dextrose Oral Gel 15 Gram(s) Oral once PRN  dronedarone 400 milliGRAM(s) Oral two times a day  glucagon  Injectable 1 milliGRAM(s) IntraMuscular once  insulin lispro (ADMELOG) corrective regimen sliding scale   SubCutaneous three times a day before meals  insulin lispro (ADMELOG) corrective regimen sliding scale   SubCutaneous at bedtime  melatonin 3 milliGRAM(s) Oral at bedtime PRN  polyethylene glycol 3350 17 Gram(s) Oral daily  predniSONE   Tablet 40 milliGRAM(s) Oral daily  sacubitril 24 mG/valsartan 26 mG 1 Tablet(s) Oral two times a day  senna 2 Tablet(s) Oral at bedtime  sodium chloride 0.65% Nasal 1 Spray(s) Both Nostrils three times a day  spironolactone 25 milliGRAM(s) Oral daily    Vital Signs Last 24 Hours  T(C): 36.4 (04-14-23 @ 08:06), Max: 36.7 (04-13-23 @ 20:00)  HR: 122 (04-14-23 @ 09:17) (91 - 123)  BP: 108/66 (04-14-23 @ 06:00) (104/64 - 125/75)  RR: 20 (04-14-23 @ 06:00) (17 - 22)  SpO2: 97% (04-14-23 @ 09:17) (95% - 100%)    I&O's Summary   13 Apr 2023 07:01  -  14 Apr 2023 07:00  --------------------------------------------------------  IN: 790 mL / OUT: 0 mL / NET: 790 mL    14 Apr 2023 07:01  -  14 Apr 2023 12:11  --------------------------------------------------------  IN: 300 mL / OUT: 0 mL / NET: 300 mL    Tele: SR/ST    Physical Exam:  General: In no distress.   HEENT: EOMs intact.  Neck: Neck supple. JVP not significantly elevated.   Chest: Fine crackles RLL-improving.  CV: RRR. Normal S1 and S2. No murmurs, rub, or gallops. Warm peripherally.  PV: Trace B/L LE edema. Pulses full/equal in all four extremities.  Abdomen: Soft, obese.  Skin: warm, dry, generalized ecchymosis.  Neurology: Alert and oriented times three. Sensation intact.  Psych: Appropriate affect.    Labs:                        15.5   12.31 )-----------( 111      ( 14 Apr 2023 03:39 )             49.6     04-14    143  |  91<L>  |  53.8<H>  ----------------------------<  117<H>  4.0   |  36.0<H>  |  1.33<H>    Ca    9.4      14 Apr 2023 03:39  Mg     2.4     04-14    CARDIAC MARKERS ( 14 Apr 2023 03:39 )  x     / 0.03 ng/mL / x     / x     / x      CARDIAC MARKERS ( 13 Apr 2023 21:30 )  x     / 0.01 ng/mL / x     / x     / x

## 2023-04-14 NOTE — SWALLOW VFSS/MBS ASSESSMENT ADULT - RECOMMENDED FEEDING/EATING TECHNIQUES
allow for swallow between intakes/alternate food with liquid/hard swallow w/ each bite or sip/maintain upright posture during/after eating for 30 mins/oral hygiene/position upright (90 degrees)/provide rest periods between swallows/small sips/bites/tuck chin

## 2023-04-14 NOTE — PROGRESS NOTE ADULT - SUBJECTIVE AND OBJECTIVE BOX
S; Denies chest pain, dyspnea resolved.     Events: EKG done 04/13/2023 - no acute changes.     TELEMETRY: sr    MEDICATIONS  (STANDING):  albuterol/ipratropium for Nebulization 3 milliLiter(s) Nebulizer every 6 hours  apixaban 5 milliGRAM(s) Oral every 12 hours  dextrose 5%. 1000 milliLiter(s) (50 mL/Hr) IV Continuous <Continuous>  dextrose 5%. 1000 milliLiter(s) (100 mL/Hr) IV Continuous <Continuous>  dextrose 50% Injectable 25 Gram(s) IV Push once  dextrose 50% Injectable 12.5 Gram(s) IV Push once  dextrose 50% Injectable 25 Gram(s) IV Push once  dronedarone 400 milliGRAM(s) Oral two times a day  glucagon  Injectable 1 milliGRAM(s) IntraMuscular once  insulin lispro (ADMELOG) corrective regimen sliding scale   SubCutaneous three times a day before meals  insulin lispro (ADMELOG) corrective regimen sliding scale   SubCutaneous at bedtime  polyethylene glycol 3350 17 Gram(s) Oral daily  predniSONE   Tablet 40 milliGRAM(s) Oral daily  sacubitril 24 mG/valsartan 26 mG 1 Tablet(s) Oral two times a day  senna 2 Tablet(s) Oral at bedtime  sodium chloride 0.65% Nasal 1 Spray(s) Both Nostrils three times a day  spironolactone 25 milliGRAM(s) Oral daily    MEDICATIONS  (PRN):  albuterol/ipratropium for Nebulization 3 milliLiter(s) Nebulizer every 3 hours PRN Shortness of Breath and/or Wheezing  bisacodyl Suppository 10 milliGRAM(s) Rectal daily PRN Constipation  dextrose Oral Gel 15 Gram(s) Oral once PRN Blood Glucose LESS THAN 70 milliGRAM(s)/deciliter  melatonin 3 milliGRAM(s) Oral at bedtime PRN Insomnia        Vital Signs Last 24 Hrs  T(C): 36.4 (14 Apr 2023 08:06), Max: 36.7 (13 Apr 2023 20:00)  T(F): 97.6 (14 Apr 2023 08:06), Max: 98 (13 Apr 2023 20:00)  HR: 105 (14 Apr 2023 12:00) (91 - 123)  BP: 145/117 (14 Apr 2023 12:00) (104/64 - 145/117)  BP(mean): 127 (14 Apr 2023 12:00) (69 - 127)  RR: 20 (14 Apr 2023 12:00) (17 - 22)  SpO2: 92% (14 Apr 2023 12:00) (92% - 100%)    Parameters below as of 14 Apr 2023 09:17  Patient On (Oxygen Delivery Method): nasal cannula,3L        Daily     Daily     I&O's Detail    13 Apr 2023 07:01  -  14 Apr 2023 07:00  --------------------------------------------------------  IN:    Oral Fluid: 790 mL  Total IN: 790 mL    OUT:  Total OUT: 0 mL    Total NET: 790 mL      14 Apr 2023 07:01  -  14 Apr 2023 13:40  --------------------------------------------------------  IN:    Oral Fluid: 300 mL  Total IN: 300 mL    OUT:  Total OUT: 0 mL    Total NET: 300 mL          PHYSICAL EXAM:  Appearance: In NAD  Neck: Obese  Cardiovascular: Normal S1 S2  Respiratory: Scattered coarse BS  Gastrointestinal:  Soft, Non-tender, + BS, no bruits	  Neurologic: Grossly non-focal.  Extremities: +  edema    LABS:                        15.5   12.31 )-----------( 111      ( 14 Apr 2023 03:39 )             49.6     04-14    143  |  91<L>  |  53.8<H>  ----------------------------<  117<H>  4.0   |  36.0<H>  |  1.33<H>    Ca    9.4      14 Apr 2023 03:39  Mg     2.4     04-14      CARDIAC MARKERS ( 14 Apr 2023 03:39 )  x     / 0.03 ng/mL / x     / x     / x      CARDIAC MARKERS ( 13 Apr 2023 21:30 )  x     / 0.01 ng/mL / x     / x     / x              I&O's Summary    13 Apr 2023 07:01  -  14 Apr 2023 07:00  --------------------------------------------------------  IN: 790 mL / OUT: 0 mL / NET: 790 mL    14 Apr 2023 07:01  -  14 Apr 2023 13:40  --------------------------------------------------------  IN: 300 mL / OUT: 0 mL / NET: 300 mL      BNP

## 2023-04-14 NOTE — SWALLOW VFSS/MBS ASSESSMENT ADULT - SLP PERTINENT HISTORY OF CURRENT PROBLEM
As per MD note, "83M with a history of hypertension, diabetes, COPD on supplemental oxygen, heart failure, and deep venous thrombosis who presented with increased dyspnea and edema found to have atrial flutter and heart failure for which intravenous furosemide was initiated. Metoprolol was held due to bradycardia with improvement in the heart rate. He had persistent atrial flutter and underwent cardioversion into sinus rhythm".

## 2023-04-14 NOTE — SWALLOW VFSS/MBS ASSESSMENT ADULT - DIAGNOSTIC IMPRESSIONS
Pt presents w/moderate pharyngeal dysphagia, and overall functional oral phase of swallow. Oral stage characterized by intact bolus formation/cohesion/propulsion. Trace-mildly prolonged A-P transit of regular solids, suspect to be impacted upon by absent lower dentition, however considered generally functional. Premature pharyngeal entry of foods to the valleculae, and variably between the valleculae/pyriforms w/liquids. Piecemeal deglutition w/solids only. No anterior loss or oral stasis.     Pharyngeal phase of swallow notable for reduced contractility, w/mildly delayed bolus progression of increasingly solid PO through pharynx. Additional trace-min-mod valleculae & trace-min pyriform stasis noted across consistencies. Increasing amounts of stasis visualized w/consistencies of increased viscosity. Most stasis successfully clears w/cued subsequent dry swallow & further presentation of liquid wash, w/the exception of moderate vallecular stasis w/regular solids, w/difficulty for progression through pharynx despite above. Reduced hyo-laryngeal elevation/excursion, incomplete epiglottic deflection & inadequate upper/lower airway protection. +Silent aspiration of thin liquids, during the swallow, in head neutral & R head turn. Additional deep penetration of thins contacting the vocal cords during the swallow w/L head turn. Deep airway entry eliminated w/use of chin tuck, w/only trace penetration above the cords without clearance noted in x 1/6 trials of thin w/specific posture. No further airway entry noted w/mildly thick liquids, or foods.   Question esophageal etiology. Pt with retention & slight retrograde progression of PO trials below the UES, in proximal cervical esophagus. No re-occupation of PO to pharyngeal space, and ultimately clears w/further PO.

## 2023-04-14 NOTE — PROGRESS NOTE ADULT - SUBJECTIVE AND OBJECTIVE BOX
LAINE   ----------------------------------------  The patient was seen at bedside. Patient with atrial flutter and heart failure. Offered no complaints.    Vital Signs Last 24 Hrs  T(C): 36.4 (14 Apr 2023 08:06), Max: 36.7 (13 Apr 2023 20:00)  T(F): 97.6 (14 Apr 2023 08:06), Max: 98 (13 Apr 2023 20:00)  HR: 122 (14 Apr 2023 09:17) (91 - 123)  BP: 108/66 (14 Apr 2023 06:00) (104/64 - 125/75)  BP(mean): 76 (14 Apr 2023 06:00) (69 - 93)  RR: 20 (14 Apr 2023 06:00) (17 - 22)  SpO2: 97% (14 Apr 2023 09:17) (95% - 100%)    Parameters below as of 14 Apr 2023 09:17  Patient On (Oxygen Delivery Method): nasal cannula,3L    CAPILLARY BLOOD GLUCOSE  POCT Blood Glucose.: 98 mg/dL (14 Apr 2023 08:05)  POCT Blood Glucose.: 122 mg/dL (13 Apr 2023 21:19)  POCT Blood Glucose.: 110 mg/dL (13 Apr 2023 17:19)  POCT Blood Glucose.: 131 mg/dL (13 Apr 2023 12:59)  POCT Blood Glucose.: 58 mg/dL (13 Apr 2023 12:46)    PHYSICAL EXAMINATION:  ----------------------------------------  General appearance: No acute distress, Awake, Alert  HEENT: Normocephalic, Atraumatic, Conjunctiva clear, EOMI  Neck: Supple, No JVD, No tenderness  Lungs: Breath sound equal bilaterally, No wheezes, No rales  Cardiovascular: S1S2, Regular rhythm  Abdomen: Soft, Nontender, Nondistended, No guarding/rebound, Positive bowel sounds  Extremities: No clubbing, No cyanosis, No calf tenderness, Lower extremity edema  Neuro: Strength equal bilaterally, No tremors  Psychiatric: Appropriate mood, Normal affect    LABORATORY STUDIES:  ----------------------------------------             15.5   12.31 )-----------( 111      ( 14 Apr 2023 03:39 )             49.6     04-14    143  |  91<L>  |  53.8<H>  ----------------------------<  117<H>  4.0   |  36.0<H>  |  1.33<H>    Ca    9.4      14 Apr 2023 03:39  Mg     2.4     04-14    MEDICATIONS  (STANDING):  albuterol/ipratropium for Nebulization 3 milliLiter(s) Nebulizer every 6 hours  apixaban 5 milliGRAM(s) Oral every 12 hours  dextrose 5%. 1000 milliLiter(s) (100 mL/Hr) IV Continuous <Continuous>  dextrose 5%. 1000 milliLiter(s) (50 mL/Hr) IV Continuous <Continuous>  dextrose 50% Injectable 25 Gram(s) IV Push once  dextrose 50% Injectable 12.5 Gram(s) IV Push once  dextrose 50% Injectable 25 Gram(s) IV Push once  dronedarone 400 milliGRAM(s) Oral two times a day  glucagon  Injectable 1 milliGRAM(s) IntraMuscular once  insulin lispro (ADMELOG) corrective regimen sliding scale   SubCutaneous three times a day before meals  insulin lispro (ADMELOG) corrective regimen sliding scale   SubCutaneous at bedtime  polyethylene glycol 3350 17 Gram(s) Oral daily  predniSONE   Tablet 40 milliGRAM(s) Oral daily  sacubitril 24 mG/valsartan 26 mG 1 Tablet(s) Oral two times a day  senna 2 Tablet(s) Oral at bedtime  sodium chloride 0.65% Nasal 1 Spray(s) Both Nostrils three times a day  spironolactone 25 milliGRAM(s) Oral daily  torsemide 20 milliGRAM(s) Oral two times a day    MEDICATIONS  (PRN):  albuterol/ipratropium for Nebulization 3 milliLiter(s) Nebulizer every 3 hours PRN Shortness of Breath and/or Wheezing  bisacodyl Suppository 10 milliGRAM(s) Rectal daily PRN Constipation  dextrose Oral Gel 15 Gram(s) Oral once PRN Blood Glucose LESS THAN 70 milliGRAM(s)/deciliter  melatonin 3 milliGRAM(s) Oral at bedtime PRN Insomnia      ASSESSMENT / PLAN:  ----------------------------------------  83M with a history of hypertension, diabetes, COPD on supplemental oxygen, heart failure, and deep venous thrombosis who presented with increased dyspnea and edema found to have atrial flutter and heart failure for which intravenous furosemide was initiated. Metoprolol was held due to bradycardia with improvement in the heart rate. He had persistent atrial flutter and underwent cardioversion into sinus rhythm.    Acute on chronic hypoxic respiratory failure - Multifactorial with heart failure and COPD. Echocardiogram was also notable for severe pulmonary hypertension. Continue on supplemental oxygen as need with titration as tolerated. Continue with nocturnal Bipap.    COPD exacerbation - Previously transitioned from intravenous methylprednisolone to prednisone. To continue with taper as tolerated. On inhaled bronchodilator therapy.    Atrial flutter - Status post DC cardioversion. On dronedarone. To follow up with Cardiology.    Acute on chronic diastolic heart failure - Previously on intravenous furosemide with metolazone. Heart failure follow up noted, on torsemide and spironolactone. On sacubitril/valsartan. Monitoring urine output.     Acute kidney injury - May require adjustment in the cardiac medications if renal function worsens.    Diabetes - Insulin coverage, close monitoring of blood glucose levels.    Hypertension - Close blood pressure monitoring.    Thrombocytopenia - To continue monitoring platelet counts, improved. Prior records from [facility] also noted the presence of thrombocytopenia.    Dysphagia - Status post modified barium swallow. Recommendations was for easy to chew diet with thin liquids.    Discussed with the patient's Alma ([phone]) in regards to the patient's condition, test results, and plan of care. Questions answered.

## 2023-04-14 NOTE — PROGRESS NOTE ADULT - PROBLEM SELECTOR PLAN 1
LVEF 55-60%, severe biatrial enlargement, moderate TR   Multiple recent HF related hospitalizations. Could be exacerbated by new onset aflutter/bradycardia. S/p GRZEGORZ/DCCV 4/13.   Diuretics: Reduce torsemide 20mg PO daily.  GDMT: Continue Entresto 24-26mg BID and Spironolactone 25 mg daily. Would benefit from SGLT2i - can attempt to add prior to discharge.  Low Na+, 1.5L FR diet advised.  Please document strict I&O and daily standing weights.  Would likely benefit from CardioMEMs device to avoid recurrent hospitalizations if patient can prove compliance. LVEF 55-60%, severe biatrial enlargement, moderate TR   Multiple recent HF related hospitalizations. Could be exacerbated by new onset aflutter/bradycardia. S/p GRZEGORZ/DCCV 4/13.   Diuretics: Reduce torsemide 20mg PO daily.  GDMT: Continue Entresto 24-26mg BID and Spironolactone 25 mg daily. Would benefit from SGLT2i - can attempt to add prior to discharge if renal function remains stable.  Low Na+, 1.5L FR diet advised.  Please document strict I&O and daily standing weights.  Would likely benefit from CardioMEMs device to avoid recurrent hospitalizations if patient can prove compliance.

## 2023-04-14 NOTE — SWALLOW VFSS/MBS ASSESSMENT ADULT - UNSUCCESSFUL STRATEGIES TRIALED DURING PROCEDURE
hard swallow/head turn to the right/head turn to the left/productive volitional cough following clinician cue

## 2023-04-14 NOTE — SWALLOW VFSS/MBS ASSESSMENT ADULT - ROSENBEK'S PENETRATION ASPIRATION SCALE
(1) no aspiration, contrast does not enter airway observed during swallow head neutral & R head turn; 5- penetration to the cords during swallow without clearance L head turn; 3- trace penetration above the cords without clearance during swallow chin tuck/(8) contrast passes glottis, visible subglottic residue remains, absent patient response (aspiration)

## 2023-04-14 NOTE — SWALLOW VFSS/MBS ASSESSMENT ADULT - LARYNGEAL PENETRATION DURING THE SWALLOW - SILENT
----- Message from Irais Shearer sent at 7/19/2018  2:08 PM CDT -----  Contact: Patient  Type: Needs Medical Advice    Who Called:  Patient  Symptoms (please be specific):    How long has patient had these symptoms:    Pharmacy name and phone #:    Best Call Back Number: [phone]  Additional Information:requesting a nurse appointment for shingles injection call back  
Advised patient to check with pharmacy to see if vaccine is covered at pharmacy.   
LOV 6/23/22  Next Appt 3/3/23  Labs 1/5/23      
trace observed during swallow chin tuck above cords; observed during swallow to cords L head turn

## 2023-04-15 LAB
ANION GAP SERPL CALC-SCNC: 10 MMOL/L — SIGNIFICANT CHANGE UP (ref 5–17)
BUN SERPL-MCNC: 52.8 MG/DL — HIGH (ref 8–20)
CALCIUM SERPL-MCNC: 9.2 MG/DL — SIGNIFICANT CHANGE UP (ref 8.4–10.5)
CHLORIDE SERPL-SCNC: 90 MMOL/L — LOW (ref 96–108)
CO2 SERPL-SCNC: 38 MMOL/L — HIGH (ref 22–29)
CREAT SERPL-MCNC: 1.35 MG/DL — HIGH (ref 0.5–1.3)
EGFR: 52 ML/MIN/1.73M2 — LOW
GLUCOSE BLDC GLUCOMTR-MCNC: 116 MG/DL — HIGH (ref 70–99)
GLUCOSE BLDC GLUCOMTR-MCNC: 138 MG/DL — HIGH (ref 70–99)
GLUCOSE BLDC GLUCOMTR-MCNC: 168 MG/DL — HIGH (ref 70–99)
GLUCOSE BLDC GLUCOMTR-MCNC: 99 MG/DL — SIGNIFICANT CHANGE UP (ref 70–99)
GLUCOSE SERPL-MCNC: 109 MG/DL — HIGH (ref 70–99)
HCT VFR BLD CALC: 46.6 % — SIGNIFICANT CHANGE UP (ref 39–50)
HGB BLD-MCNC: 14.8 G/DL — SIGNIFICANT CHANGE UP (ref 13–17)
MCHC RBC-ENTMCNC: 31.8 GM/DL — LOW (ref 32–36)
MCHC RBC-ENTMCNC: 33 PG — SIGNIFICANT CHANGE UP (ref 27–34)
MCV RBC AUTO: 103.8 FL — HIGH (ref 80–100)
NT-PROBNP SERPL-SCNC: 144 PG/ML — SIGNIFICANT CHANGE UP (ref 0–300)
PLATELET # BLD AUTO: 110 K/UL — LOW (ref 150–400)
POTASSIUM SERPL-MCNC: 4.4 MMOL/L — SIGNIFICANT CHANGE UP (ref 3.5–5.3)
POTASSIUM SERPL-SCNC: 4.4 MMOL/L — SIGNIFICANT CHANGE UP (ref 3.5–5.3)
RBC # BLD: 4.49 M/UL — SIGNIFICANT CHANGE UP (ref 4.2–5.8)
RBC # FLD: 13.9 % — SIGNIFICANT CHANGE UP (ref 10.3–14.5)
SODIUM SERPL-SCNC: 138 MMOL/L — SIGNIFICANT CHANGE UP (ref 135–145)
WBC # BLD: 13.44 K/UL — HIGH (ref 3.8–10.5)
WBC # FLD AUTO: 13.44 K/UL — HIGH (ref 3.8–10.5)

## 2023-04-15 PROCEDURE — 99232 SBSQ HOSP IP/OBS MODERATE 35: CPT

## 2023-04-15 RX ADMIN — DRONEDARONE 400 MILLIGRAM(S): 400 TABLET, FILM COATED ORAL at 17:53

## 2023-04-15 RX ADMIN — APIXABAN 5 MILLIGRAM(S): 2.5 TABLET, FILM COATED ORAL at 17:53

## 2023-04-15 RX ADMIN — Medication 1 SPRAY(S): at 05:47

## 2023-04-15 RX ADMIN — Medication 40 MILLIGRAM(S): at 05:46

## 2023-04-15 RX ADMIN — Medication 3 MILLILITER(S): at 14:59

## 2023-04-15 RX ADMIN — SPIRONOLACTONE 25 MILLIGRAM(S): 25 TABLET, FILM COATED ORAL at 05:47

## 2023-04-15 RX ADMIN — Medication 1: at 12:29

## 2023-04-15 RX ADMIN — Medication 3 MILLILITER(S): at 03:42

## 2023-04-15 RX ADMIN — Medication 3 MILLILITER(S): at 22:40

## 2023-04-15 RX ADMIN — Medication 3 MILLILITER(S): at 09:00

## 2023-04-15 RX ADMIN — Medication 20 MILLIGRAM(S): at 05:46

## 2023-04-15 RX ADMIN — SACUBITRIL AND VALSARTAN 1 TABLET(S): 24; 26 TABLET, FILM COATED ORAL at 05:46

## 2023-04-15 RX ADMIN — Medication 1 SPRAY(S): at 22:38

## 2023-04-15 RX ADMIN — Medication 1 SPRAY(S): at 17:54

## 2023-04-15 RX ADMIN — DRONEDARONE 400 MILLIGRAM(S): 400 TABLET, FILM COATED ORAL at 05:46

## 2023-04-15 RX ADMIN — APIXABAN 5 MILLIGRAM(S): 2.5 TABLET, FILM COATED ORAL at 05:46

## 2023-04-15 NOTE — PROGRESS NOTE ADULT - SUBJECTIVE AND OBJECTIVE BOX
[facility]    [id number]    83y      Male    CC: SOB    INTERVAL HPI/OVERNIGHT EVENTS: pt seen and examined.     REVIEW OF SYSTEMS:    CONSTITUTIONAL: No fever, weight loss  RESPIRATORY: No cough, wheezing, hemoptysis  CARDIOVASCULAR: No chest pain, palpitations  GASTROINTESTINAL: No abdominal or epigastric pain. No nausea, vomiting  NEUROLOGICAL: No headaches    Vital Signs Last 24 Hrs  T(C): 36.4 (15 Apr 2023 10:00), Max: 36.9 (14 Apr 2023 20:30)  T(F): 97.5 (15 Apr 2023 10:00), Max: 98.4 (14 Apr 2023 20:30)  HR: 95 (15 Apr 2023 10:30) (62 - 121)  BP: 110/57 (15 Apr 2023 10:00) (100/62 - 127/75)  BP(mean): 84 (14 Apr 2023 18:00) (84 - 84)  RR: 18 (15 Apr 2023 10:30) (16 - 19)  SpO2: 96% (15 Apr 2023 10:30) (91% - 98%)    Parameters below as of 15 Apr 2023 10:30  Patient On (Oxygen Delivery Method): nasal cannula  O2 Flow (L/min): 3      PHYSICAL EXAM:    GENERAL: NAD  HEENT: atraumatic   NECK: soft, supple  CHEST/LUNG: decreased sounds at bases, respirations unlabored on NC ; +BLE edema   HEART: S1S2+, Regular rate and rhythm  ABDOMEN: Soft, Nontender, Nondistended; Bowel sounds present  SKIN: warm, dry; BLE chronic skin changes   NEURO: Awake, alert; grossly non-focal   PSYCH: normal mood    LABS:                        14.8   13.44 )-----------( 110      ( 15 Apr 2023 06:19 )             46.6     04-15    138  |  90<L>  |  52.8<H>  ----------------------------<  109<H>  4.4   |  38.0<H>  |  1.35<H>    Ca    9.2      15 Apr 2023 06:19  Mg     2.4     04-14              MEDICATIONS  (STANDING):  albuterol/ipratropium for Nebulization 3 milliLiter(s) Nebulizer every 6 hours  apixaban 5 milliGRAM(s) Oral every 12 hours  dextrose 5%. 1000 milliLiter(s) (50 mL/Hr) IV Continuous <Continuous>  dextrose 5%. 1000 milliLiter(s) (100 mL/Hr) IV Continuous <Continuous>  dextrose 50% Injectable 25 Gram(s) IV Push once  dextrose 50% Injectable 12.5 Gram(s) IV Push once  dextrose 50% Injectable 25 Gram(s) IV Push once  dronedarone 400 milliGRAM(s) Oral two times a day  glucagon  Injectable 1 milliGRAM(s) IntraMuscular once  insulin lispro (ADMELOG) corrective regimen sliding scale   SubCutaneous three times a day before meals  insulin lispro (ADMELOG) corrective regimen sliding scale   SubCutaneous at bedtime  polyethylene glycol 3350 17 Gram(s) Oral daily  predniSONE   Tablet 40 milliGRAM(s) Oral daily  sacubitril 24 mG/valsartan 26 mG 1 Tablet(s) Oral two times a day  senna 2 Tablet(s) Oral at bedtime  sodium chloride 0.65% Nasal 1 Spray(s) Both Nostrils three times a day  spironolactone 25 milliGRAM(s) Oral daily  torsemide 20 milliGRAM(s) Oral daily    MEDICATIONS  (PRN):  albuterol/ipratropium for Nebulization 3 milliLiter(s) Nebulizer every 3 hours PRN Shortness of Breath and/or Wheezing  bisacodyl Suppository 10 milliGRAM(s) Rectal daily PRN Constipation  dextrose Oral Gel 15 Gram(s) Oral once PRN Blood Glucose LESS THAN 70 milliGRAM(s)/deciliter  melatonin 3 milliGRAM(s) Oral at bedtime PRN Insomnia      RADIOLOGY & ADDITIONAL TESTS:

## 2023-04-15 NOTE — PROGRESS NOTE ADULT - ASSESSMENT
83y/oM PMH HTN, DM, COPD on home O2 3LNC, CHF, DVT on Eliquis presenting with increased dyspnea, edema, found to have atrial flutter and heart failure exacerbation.    Acute on chronic respiratory failure with hypoxia and hypercapnia, multifactorial due to acute on chronic HFpEF and COPD exacerbation, CHALO/OHS, severe PH with cor pulmonale   -TTE: LVF 55-60%, severe pHTN   -cont supplemental O2, (baseline 3LNC)   -IS   -cardio, HF recs appreciated   -pulm recs appreciated   -s/p IV steroids, now on PO prednisone taper   -nebs   -s/p IV lasix, metolazone   -cont PO torsemide, spironolactone, entresto   -add SGLT2i if renal function stable   -fluid restriction 1.5L   -strict I/Os   -daily weights   -nocturnal bipap compliance encouraged     Atrial flutter with slow ventricular response   -now s/p cardioversion 4/13   -cont dronedarone   -f/u Dr. Horne   -cont tele monitoring for intermittent episodes ST     BRANDEN   -cont to monitor on diuretics     HTN   -entresto     DM2   -a1c 5.9  -ISS     Thrombocytopenia   -also seen on prior records from [facility], cont to monitor   -outpt f/u heme/onc     vte ppx: eliquis

## 2023-04-15 NOTE — PROGRESS NOTE ADULT - ASSESSMENT
- New onset atrial flutter with tachy/renetta syndrome - s/p spontaneous conversion to sinus rhythm   - Respiratory failure with CO2- improved    - HFpEF -  much improved fluid status    Plan  Continue Eliquis 5mg BID  Continue dronedarone 400mg BID  Continue torsemide, spironolactone

## 2023-04-15 NOTE — PROGRESS NOTE ADULT - SUBJECTIVE AND OBJECTIVE BOX
Interval Hx: reports improved breathing    Heart failure    Handoff    MEWS Score    Congestive heart failure (CHF)    COPD (chronic obstructive pulmonary disease)    DM (diabetes mellitus)    HTN (hypertension)    BPH (benign prostatic hyperplasia)    Transesophageal echocardiography with cardioversion    CHF exacerbation    Acute on chronic diastolic congestive heart failure    Bradycardia    Atrial flutter    Acute on chronic respiratory failure with hypoxia    COPD without exacerbation    HTN (hypertension)    T2DM (type 2 diabetes mellitus)    Acute on chronic respiratory failure with hypoxia and hypercapnia    Obesity hypoventilation syndrome    CHALO (obstructive sleep apnea)    S/P cholecystectomy    History of appendectomy    LEG SWELLING    New onset atrial flutter    Bradycardia    [email or web address]        albuterol/ipratropium for Nebulization 3 milliLiter(s) Nebulizer every 6 hours  albuterol/ipratropium for Nebulization 3 milliLiter(s) Nebulizer every 3 hours PRN  apixaban 5 milliGRAM(s) Oral every 12 hours  bisacodyl Suppository 10 milliGRAM(s) Rectal daily PRN  dextrose 5%. 1000 milliLiter(s) IV Continuous <Continuous>  dextrose 5%. 1000 milliLiter(s) IV Continuous <Continuous>  dextrose 50% Injectable 25 Gram(s) IV Push once  dextrose 50% Injectable 12.5 Gram(s) IV Push once  dextrose 50% Injectable 25 Gram(s) IV Push once  dextrose Oral Gel 15 Gram(s) Oral once PRN  dronedarone 400 milliGRAM(s) Oral two times a day  glucagon  Injectable 1 milliGRAM(s) IntraMuscular once  insulin lispro (ADMELOG) corrective regimen sliding scale   SubCutaneous three times a day before meals  insulin lispro (ADMELOG) corrective regimen sliding scale   SubCutaneous at bedtime  melatonin 3 milliGRAM(s) Oral at bedtime PRN  polyethylene glycol 3350 17 Gram(s) Oral daily  predniSONE   Tablet 40 milliGRAM(s) Oral daily  sacubitril 24 mG/valsartan 26 mG 1 Tablet(s) Oral two times a day  senna 2 Tablet(s) Oral at bedtime  sodium chloride 0.65% Nasal 1 Spray(s) Both Nostrils three times a day  spironolactone 25 milliGRAM(s) Oral daily  torsemide 20 milliGRAM(s) Oral daily      T(C): 36.4 (04-15-23 @ 10:00), Max: 36.9 (04-14-23 @ 20:30)  HR: 94 (04-15-23 @ 10:00) (62 - 121)  BP: 110/57 (04-15-23 @ 10:00) (100/62 - 145/117)  RR: 18 (04-15-23 @ 10:00) (16 - 20)  SpO2: 91% (04-15-23 @ 10:00) (91% - 98%)    04-14-23 @ 07:01  -  04-15-23 @ 07:00  --------------------------------------------------------  IN: 1440 mL / OUT: 0 mL / NET: 1440 mL        Gen: no distress  CV: normal S1 and S2, 2-3+ pitting LE edema  Resp: Lungs CTA      04-15    138  |  90<L>  |  52.8<H>  ----------------------------<  109<H>  4.4   |  38.0<H>  |  1.35<H>    Ca    9.2      15 Apr 2023 06:19  Mg     2.4     04-14                          14.8   13.44 )-----------( 110      ( 15 Apr 2023 06:19 )             46.6

## 2023-04-16 LAB
ANION GAP SERPL CALC-SCNC: 8 MMOL/L — SIGNIFICANT CHANGE UP (ref 5–17)
ANISOCYTOSIS BLD QL: SLIGHT — SIGNIFICANT CHANGE UP
BASOPHILS # BLD AUTO: 0.01 K/UL — SIGNIFICANT CHANGE UP (ref 0–0.2)
BASOPHILS NFR BLD AUTO: 0.1 % — SIGNIFICANT CHANGE UP (ref 0–2)
BUN SERPL-MCNC: 39.6 MG/DL — HIGH (ref 8–20)
CALCIUM SERPL-MCNC: 8.7 MG/DL — SIGNIFICANT CHANGE UP (ref 8.4–10.5)
CHLORIDE SERPL-SCNC: 90 MMOL/L — LOW (ref 96–108)
CO2 SERPL-SCNC: 37 MMOL/L — HIGH (ref 22–29)
CREAT SERPL-MCNC: 1.05 MG/DL — SIGNIFICANT CHANGE UP (ref 0.5–1.3)
DACRYOCYTES BLD QL SMEAR: SLIGHT — SIGNIFICANT CHANGE UP
EGFR: 70 ML/MIN/1.73M2 — SIGNIFICANT CHANGE UP
EOSINOPHIL # BLD AUTO: 0.07 K/UL — SIGNIFICANT CHANGE UP (ref 0–0.5)
EOSINOPHIL NFR BLD AUTO: 0.5 % — SIGNIFICANT CHANGE UP (ref 0–6)
GLUCOSE BLDC GLUCOMTR-MCNC: 117 MG/DL — HIGH (ref 70–99)
GLUCOSE BLDC GLUCOMTR-MCNC: 119 MG/DL — HIGH (ref 70–99)
GLUCOSE BLDC GLUCOMTR-MCNC: 124 MG/DL — HIGH (ref 70–99)
GLUCOSE BLDC GLUCOMTR-MCNC: 175 MG/DL — HIGH (ref 70–99)
GLUCOSE SERPL-MCNC: 99 MG/DL — SIGNIFICANT CHANGE UP (ref 70–99)
HCT VFR BLD CALC: 46.5 % — SIGNIFICANT CHANGE UP (ref 39–50)
HGB BLD-MCNC: 14.9 G/DL — SIGNIFICANT CHANGE UP (ref 13–17)
IMM GRANULOCYTES NFR BLD AUTO: 0.8 % — SIGNIFICANT CHANGE UP (ref 0–0.9)
LYMPHOCYTES # BLD AUTO: 2.77 K/UL — SIGNIFICANT CHANGE UP (ref 1–3.3)
LYMPHOCYTES # BLD AUTO: 21.3 % — SIGNIFICANT CHANGE UP (ref 13–44)
MACROCYTES BLD QL: SLIGHT — SIGNIFICANT CHANGE UP
MAGNESIUM SERPL-MCNC: 2.2 MG/DL — SIGNIFICANT CHANGE UP (ref 1.6–2.6)
MANUAL SMEAR VERIFICATION: SIGNIFICANT CHANGE UP
MCHC RBC-ENTMCNC: 32 GM/DL — SIGNIFICANT CHANGE UP (ref 32–36)
MCHC RBC-ENTMCNC: 33.1 PG — SIGNIFICANT CHANGE UP (ref 27–34)
MCV RBC AUTO: 103.3 FL — HIGH (ref 80–100)
MONOCYTES # BLD AUTO: 1.01 K/UL — HIGH (ref 0–0.9)
MONOCYTES NFR BLD AUTO: 7.8 % — SIGNIFICANT CHANGE UP (ref 2–14)
NEUTROPHILS # BLD AUTO: 9.02 K/UL — HIGH (ref 1.8–7.4)
NEUTROPHILS NFR BLD AUTO: 69.5 % — SIGNIFICANT CHANGE UP (ref 43–77)
PHOSPHATE SERPL-MCNC: 3.4 MG/DL — SIGNIFICANT CHANGE UP (ref 2.4–4.7)
PLAT MORPH BLD: NORMAL — SIGNIFICANT CHANGE UP
PLATELET # BLD AUTO: 96 K/UL — LOW (ref 150–400)
POIKILOCYTOSIS BLD QL AUTO: SLIGHT — SIGNIFICANT CHANGE UP
POTASSIUM SERPL-MCNC: 3.8 MMOL/L — SIGNIFICANT CHANGE UP (ref 3.5–5.3)
POTASSIUM SERPL-SCNC: 3.8 MMOL/L — SIGNIFICANT CHANGE UP (ref 3.5–5.3)
RBC # BLD: 4.5 M/UL — SIGNIFICANT CHANGE UP (ref 4.2–5.8)
RBC # FLD: 13.8 % — SIGNIFICANT CHANGE UP (ref 10.3–14.5)
RBC BLD AUTO: ABNORMAL
SODIUM SERPL-SCNC: 135 MMOL/L — SIGNIFICANT CHANGE UP (ref 135–145)
WBC # BLD: 12.98 K/UL — HIGH (ref 3.8–10.5)
WBC # FLD AUTO: 12.98 K/UL — HIGH (ref 3.8–10.5)

## 2023-04-16 PROCEDURE — 99232 SBSQ HOSP IP/OBS MODERATE 35: CPT

## 2023-04-16 RX ADMIN — Medication 1: at 12:28

## 2023-04-16 RX ADMIN — Medication 3 MILLILITER(S): at 21:06

## 2023-04-16 RX ADMIN — APIXABAN 5 MILLIGRAM(S): 2.5 TABLET, FILM COATED ORAL at 18:03

## 2023-04-16 RX ADMIN — Medication 1 SPRAY(S): at 14:04

## 2023-04-16 RX ADMIN — Medication 20 MILLIGRAM(S): at 05:55

## 2023-04-16 RX ADMIN — SACUBITRIL AND VALSARTAN 1 TABLET(S): 24; 26 TABLET, FILM COATED ORAL at 05:51

## 2023-04-16 RX ADMIN — APIXABAN 5 MILLIGRAM(S): 2.5 TABLET, FILM COATED ORAL at 05:54

## 2023-04-16 RX ADMIN — SACUBITRIL AND VALSARTAN 1 TABLET(S): 24; 26 TABLET, FILM COATED ORAL at 00:37

## 2023-04-16 RX ADMIN — Medication 3 MILLILITER(S): at 15:20

## 2023-04-16 RX ADMIN — DRONEDARONE 400 MILLIGRAM(S): 400 TABLET, FILM COATED ORAL at 18:03

## 2023-04-16 RX ADMIN — Medication 3 MILLILITER(S): at 08:39

## 2023-04-16 RX ADMIN — SACUBITRIL AND VALSARTAN 1 TABLET(S): 24; 26 TABLET, FILM COATED ORAL at 18:03

## 2023-04-16 RX ADMIN — Medication 1 SPRAY(S): at 21:42

## 2023-04-16 RX ADMIN — DRONEDARONE 400 MILLIGRAM(S): 400 TABLET, FILM COATED ORAL at 05:55

## 2023-04-16 RX ADMIN — Medication 1 SPRAY(S): at 05:50

## 2023-04-16 RX ADMIN — Medication 40 MILLIGRAM(S): at 05:52

## 2023-04-16 RX ADMIN — SPIRONOLACTONE 25 MILLIGRAM(S): 25 TABLET, FILM COATED ORAL at 05:55

## 2023-04-16 NOTE — PROGRESS NOTE ADULT - SUBJECTIVE AND OBJECTIVE BOX
Patient states he wishes to be discharged home. Reports improved breathing    Heart failure    Handoff    MEWS Score    Congestive heart failure (CHF)    COPD (chronic obstructive pulmonary disease)    DM (diabetes mellitus)    HTN (hypertension)    BPH (benign prostatic hyperplasia)    Transesophageal echocardiography with cardioversion    CHF exacerbation    Acute on chronic diastolic congestive heart failure    Bradycardia    Atrial flutter    Acute on chronic respiratory failure with hypoxia    COPD without exacerbation    HTN (hypertension)    T2DM (type 2 diabetes mellitus)    Acute on chronic respiratory failure with hypoxia and hypercapnia    Obesity hypoventilation syndrome    CHALO (obstructive sleep apnea)    S/P cholecystectomy    History of appendectomy    LEG SWELLING    New onset atrial flutter    Bradycardia    SysAdmin_VisitLink        albuterol/ipratropium for Nebulization 3 milliLiter(s) Nebulizer every 3 hours PRN  albuterol/ipratropium for Nebulization 3 milliLiter(s) Nebulizer every 6 hours  apixaban 5 milliGRAM(s) Oral every 12 hours  bisacodyl Suppository 10 milliGRAM(s) Rectal daily PRN  dextrose 5%. 1000 milliLiter(s) IV Continuous <Continuous>  dextrose 5%. 1000 milliLiter(s) IV Continuous <Continuous>  dextrose 50% Injectable 25 Gram(s) IV Push once  dextrose 50% Injectable 25 Gram(s) IV Push once  dextrose 50% Injectable 12.5 Gram(s) IV Push once  dextrose Oral Gel 15 Gram(s) Oral once PRN  dronedarone 400 milliGRAM(s) Oral two times a day  glucagon  Injectable 1 milliGRAM(s) IntraMuscular once  insulin lispro (ADMELOG) corrective regimen sliding scale   SubCutaneous at bedtime  insulin lispro (ADMELOG) corrective regimen sliding scale   SubCutaneous three times a day before meals  melatonin 3 milliGRAM(s) Oral at bedtime PRN  polyethylene glycol 3350 17 Gram(s) Oral daily  predniSONE   Tablet 40 milliGRAM(s) Oral daily  sacubitril 24 mG/valsartan 26 mG 1 Tablet(s) Oral two times a day  senna 2 Tablet(s) Oral at bedtime  sodium chloride 0.65% Nasal 1 Spray(s) Both Nostrils three times a day  spironolactone 25 milliGRAM(s) Oral daily  torsemide 20 milliGRAM(s) Oral daily      T(C): 36.5 (04-16-23 @ 10:35), Max: 36.9 (04-15-23 @ 20:15)  HR: 93 (04-16-23 @ 10:35) (88 - 101)  BP: 104/68 (04-16-23 @ 10:35) (101/57 - 129/77)  RR: 18 (04-16-23 @ 10:35) (18 - 20)  SpO2: 95% (04-16-23 @ 10:35) (94% - 99%)      Gen: no distress  CV: normal S1 and S2, 2+ pitting edema  Resp: diminished BS      04-16    135  |  90<L>  |  39.6<H>  ----------------------------<  99  3.8   |  37.0<H>  |  1.05    Ca    8.7      16 Apr 2023 07:00  Phos  3.4     04-16  Mg     2.2     04-16

## 2023-04-16 NOTE — PROGRESS NOTE ADULT - ASSESSMENT
- New onset atrial flutter with tachy/renetta syndrome - s/p spontaneous conversion to sinus rhythm   - Respiratory failure with CO2- improved    - Acute on chronic CHF: EF 35-40% by GRZEGORZ. Improving fluid status    Plan  Continue Eliquis 5mg BID  Continue dronedarone 400mg BID  Continue torsemide, spironolactone, entresto  Follow up in 1 to 2 weeks.   - New onset atrial flutter with tachy/renetta syndrome - s/p spontaneous conversion to sinus rhythm   - Respiratory failure with CO2- improved    - Acute on chronic CHF: EF 35-40% by GRZEGORZ. Improving fluid status    Plan  Continue Eliquis 5mg BID  Continue dronedarone 400mg BID  Continue torsemide, spironolactone, entresto  Follow up in 1 to 2 weeks.   For EP care, patient is to follow up with me, not with Dr. Mendoza

## 2023-04-16 NOTE — PHYSICAL THERAPY INITIAL EVALUATION ADULT - ADDITIONAL COMMENTS
Pt reports living in an apt within his daughters home, no steps to enter/inside. Pt reports daughter does not work able to assist if needed. Independent prior to admission with use of [employer]. Owns all DME.

## 2023-04-16 NOTE — PROGRESS NOTE ADULT - SUBJECTIVE AND OBJECTIVE BOX
[facility]    [id number]    83y      Male    CC: sob    INTERVAL HPI/OVERNIGHT EVENTS: pt seen and examined. no acute events o/n     REVIEW OF SYSTEMS:    CONSTITUTIONAL: No fever, weight loss  RESPIRATORY: No cough, wheezing, hemoptysis  CARDIOVASCULAR: No chest pain, palpitations  GASTROINTESTINAL: No abdominal or epigastric pain. No nausea, vomiting  NEUROLOGICAL: No headaches    Vital Signs Last 24 Hrs  T(C): 36.5 (16 Apr 2023 10:35), Max: 36.9 (15 Apr 2023 20:15)  T(F): 97.7 (16 Apr 2023 10:35), Max: 98.4 (15 Apr 2023 20:15)  HR: 93 (16 Apr 2023 10:35) (88 - 101)  BP: 104/68 (16 Apr 2023 10:35) (101/57 - 129/77)  BP(mean): --  RR: 18 (16 Apr 2023 10:35) (18 - 20)  SpO2: 95% (16 Apr 2023 10:35) (94% - 99%)    Parameters below as of 16 Apr 2023 10:35  Patient On (Oxygen Delivery Method): nasal cannula  O2 Flow (L/min): 3      PHYSICAL EXAM:    GENERAL: NAD  HEENT: atraumatic   NECK: soft, supple  CHEST/LUNG: decreased sounds at bases, respirations unlabored on NC ; +BLE edema   HEART: S1S2+, Regular rate and rhythm  ABDOMEN: Soft, Nontender, Nondistended; Bowel sounds present  SKIN: warm, dry; BLE chronic skin changes   NEURO: Awake, alert; grossly non-focal   PSYCH: calm, cooperative     LABS:                        14.9   12.98 )-----------( 96       ( 16 Apr 2023 07:00 )             46.5     04-16    135  |  90<L>  |  39.6<H>  ----------------------------<  99  3.8   |  37.0<H>  |  1.05    Ca    8.7      16 Apr 2023 07:00  Phos  3.4     04-16  Mg     2.2     04-16              MEDICATIONS  (STANDING):  albuterol/ipratropium for Nebulization 3 milliLiter(s) Nebulizer every 6 hours  apixaban 5 milliGRAM(s) Oral every 12 hours  dextrose 5%. 1000 milliLiter(s) (100 mL/Hr) IV Continuous <Continuous>  dextrose 5%. 1000 milliLiter(s) (50 mL/Hr) IV Continuous <Continuous>  dextrose 50% Injectable 25 Gram(s) IV Push once  dextrose 50% Injectable 25 Gram(s) IV Push once  dextrose 50% Injectable 12.5 Gram(s) IV Push once  dronedarone 400 milliGRAM(s) Oral two times a day  glucagon  Injectable 1 milliGRAM(s) IntraMuscular once  insulin lispro (ADMELOG) corrective regimen sliding scale   SubCutaneous at bedtime  insulin lispro (ADMELOG) corrective regimen sliding scale   SubCutaneous three times a day before meals  polyethylene glycol 3350 17 Gram(s) Oral daily  predniSONE   Tablet 40 milliGRAM(s) Oral daily  sacubitril 24 mG/valsartan 26 mG 1 Tablet(s) Oral two times a day  senna 2 Tablet(s) Oral at bedtime  sodium chloride 0.65% Nasal 1 Spray(s) Both Nostrils three times a day  spironolactone 25 milliGRAM(s) Oral daily  torsemide 20 milliGRAM(s) Oral daily    MEDICATIONS  (PRN):  albuterol/ipratropium for Nebulization 3 milliLiter(s) Nebulizer every 3 hours PRN Shortness of Breath and/or Wheezing  bisacodyl Suppository 10 milliGRAM(s) Rectal daily PRN Constipation  dextrose Oral Gel 15 Gram(s) Oral once PRN Blood Glucose LESS THAN 70 milliGRAM(s)/deciliter  melatonin 3 milliGRAM(s) Oral at bedtime PRN Insomnia      RADIOLOGY & ADDITIONAL TESTS:

## 2023-04-16 NOTE — PHYSICAL THERAPY INITIAL EVALUATION ADULT - PERTINENT HX OF CURRENT PROBLEM, REHAB EVAL
presenting with increased dyspnea, edema, found to have atrial flutter and heart failure exacerbation.

## 2023-04-16 NOTE — PROGRESS NOTE ADULT - ASSESSMENT
83y/oM PMH HTN, DM, COPD on home O2 3LNC, CHF, DVT on Eliquis presenting with increased dyspnea, edema, found to have atrial flutter and heart failure exacerbation.    Acute on chronic respiratory failure with hypoxia and hypercapnia, multifactorial due to acute on chronic HFpEF and COPD exacerbation, CHALO/OHS, severe PH with cor pulmonale   -TTE: LVF 55-60%, severe pHTN   -cont supplemental O2, (baseline 3LNC)   -IS   -cardio, HF recs appreciated   -pulm recs appreciated   -s/p IV steroids, now on PO prednisone taper   -nebs   -s/p IV lasix, metolazone   -cont PO torsemide, spironolactone, entresto   -add SGLT2i if renal function stable   -fluid restriction 1.5L   -strict I/Os   -daily weights   -nocturnal bipap compliance encouraged     Atrial flutter with slow ventricular response   -now s/p cardioversion 4/13   -cont dronedarone   -f/u Dr. Mendoza  -cont tele monitoring for intermittent episodes ST     BRANDEN   -cont to monitor on diuretics     HTN   -entresto     DM2   -a1c 5.9  -ISS     Thrombocytopenia   -also seen on prior records from [facility], cont to monitor   -outpt f/u heme/onc     vte ppx: eliquis     dispo: home w/home pt/assist likely next 24-48hrs

## 2023-04-17 ENCOUNTER — TRANSCRIPTION ENCOUNTER (OUTPATIENT)
Age: 83
End: 2023-04-17

## 2023-04-17 VITALS
SYSTOLIC BLOOD PRESSURE: 108 MMHG | OXYGEN SATURATION: 94 % | TEMPERATURE: 98 F | DIASTOLIC BLOOD PRESSURE: 69 MMHG | HEART RATE: 98 BPM | RESPIRATION RATE: 17 BRPM

## 2023-04-17 LAB
ANION GAP SERPL CALC-SCNC: 11 MMOL/L — SIGNIFICANT CHANGE UP (ref 5–17)
BUN SERPL-MCNC: 35.6 MG/DL — HIGH (ref 8–20)
CALCIUM SERPL-MCNC: 8.8 MG/DL — SIGNIFICANT CHANGE UP (ref 8.4–10.5)
CHLORIDE SERPL-SCNC: 92 MMOL/L — LOW (ref 96–108)
CO2 SERPL-SCNC: 33 MMOL/L — HIGH (ref 22–29)
CREAT SERPL-MCNC: 1.12 MG/DL — SIGNIFICANT CHANGE UP (ref 0.5–1.3)
EGFR: 65 ML/MIN/1.73M2 — SIGNIFICANT CHANGE UP
GLUCOSE BLDC GLUCOMTR-MCNC: 109 MG/DL — HIGH (ref 70–99)
GLUCOSE BLDC GLUCOMTR-MCNC: 132 MG/DL — HIGH (ref 70–99)
GLUCOSE BLDC GLUCOMTR-MCNC: 156 MG/DL — HIGH (ref 70–99)
GLUCOSE SERPL-MCNC: 83 MG/DL — SIGNIFICANT CHANGE UP (ref 70–99)
HCT VFR BLD CALC: 47.4 % — SIGNIFICANT CHANGE UP (ref 39–50)
HGB BLD-MCNC: 15.1 G/DL — SIGNIFICANT CHANGE UP (ref 13–17)
MAGNESIUM SERPL-MCNC: 2.3 MG/DL — SIGNIFICANT CHANGE UP (ref 1.6–2.6)
MCHC RBC-ENTMCNC: 31.9 GM/DL — LOW (ref 32–36)
MCHC RBC-ENTMCNC: 33.4 PG — SIGNIFICANT CHANGE UP (ref 27–34)
MCV RBC AUTO: 104.9 FL — HIGH (ref 80–100)
PLATELET # BLD AUTO: 88 K/UL — LOW (ref 150–400)
POTASSIUM SERPL-MCNC: 4.2 MMOL/L — SIGNIFICANT CHANGE UP (ref 3.5–5.3)
POTASSIUM SERPL-SCNC: 4.2 MMOL/L — SIGNIFICANT CHANGE UP (ref 3.5–5.3)
RBC # BLD: 4.52 M/UL — SIGNIFICANT CHANGE UP (ref 4.2–5.8)
RBC # FLD: 13.8 % — SIGNIFICANT CHANGE UP (ref 10.3–14.5)
SODIUM SERPL-SCNC: 136 MMOL/L — SIGNIFICANT CHANGE UP (ref 135–145)
WBC # BLD: 14.29 K/UL — HIGH (ref 3.8–10.5)
WBC # FLD AUTO: 14.29 K/UL — HIGH (ref 3.8–10.5)

## 2023-04-17 PROCEDURE — 82947 ASSAY GLUCOSE BLOOD QUANT: CPT

## 2023-04-17 PROCEDURE — 36600 WITHDRAWAL OF ARTERIAL BLOOD: CPT

## 2023-04-17 PROCEDURE — 84484 ASSAY OF TROPONIN QUANT: CPT

## 2023-04-17 PROCEDURE — 93005 ELECTROCARDIOGRAM TRACING: CPT

## 2023-04-17 PROCEDURE — 92526 ORAL FUNCTION THERAPY: CPT

## 2023-04-17 PROCEDURE — 80053 COMPREHEN METABOLIC PANEL: CPT

## 2023-04-17 PROCEDURE — 84100 ASSAY OF PHOSPHORUS: CPT

## 2023-04-17 PROCEDURE — 94760 N-INVAS EAR/PLS OXIMETRY 1: CPT

## 2023-04-17 PROCEDURE — 82330 ASSAY OF CALCIUM: CPT

## 2023-04-17 PROCEDURE — 71045 X-RAY EXAM CHEST 1 VIEW: CPT

## 2023-04-17 PROCEDURE — 82607 VITAMIN B-12: CPT

## 2023-04-17 PROCEDURE — 83605 ASSAY OF LACTIC ACID: CPT

## 2023-04-17 PROCEDURE — 94660 CPAP INITIATION&MGMT: CPT

## 2023-04-17 PROCEDURE — 82803 BLOOD GASES ANY COMBINATION: CPT

## 2023-04-17 PROCEDURE — 82962 GLUCOSE BLOOD TEST: CPT

## 2023-04-17 PROCEDURE — 96375 TX/PRO/DX INJ NEW DRUG ADDON: CPT

## 2023-04-17 PROCEDURE — 85610 PROTHROMBIN TIME: CPT

## 2023-04-17 PROCEDURE — 83880 ASSAY OF NATRIURETIC PEPTIDE: CPT

## 2023-04-17 PROCEDURE — 93312 ECHO TRANSESOPHAGEAL: CPT

## 2023-04-17 PROCEDURE — 85027 COMPLETE CBC AUTOMATED: CPT

## 2023-04-17 PROCEDURE — 84132 ASSAY OF SERUM POTASSIUM: CPT

## 2023-04-17 PROCEDURE — 92610 EVALUATE SWALLOWING FUNCTION: CPT

## 2023-04-17 PROCEDURE — 94640 AIRWAY INHALATION TREATMENT: CPT

## 2023-04-17 PROCEDURE — 99239 HOSP IP/OBS DSCHRG MGMT >30: CPT

## 2023-04-17 PROCEDURE — 74230 X-RAY XM SWLNG FUNCJ C+: CPT

## 2023-04-17 PROCEDURE — 80048 BASIC METABOLIC PNL TOTAL CA: CPT

## 2023-04-17 PROCEDURE — 84443 ASSAY THYROID STIM HORMONE: CPT

## 2023-04-17 PROCEDURE — 83735 ASSAY OF MAGNESIUM: CPT

## 2023-04-17 PROCEDURE — 81001 URINALYSIS AUTO W/SCOPE: CPT

## 2023-04-17 PROCEDURE — 85025 COMPLETE CBC W/AUTO DIFF WBC: CPT

## 2023-04-17 PROCEDURE — 85018 HEMOGLOBIN: CPT

## 2023-04-17 PROCEDURE — 36415 COLL VENOUS BLD VENIPUNCTURE: CPT

## 2023-04-17 PROCEDURE — 99232 SBSQ HOSP IP/OBS MODERATE 35: CPT

## 2023-04-17 PROCEDURE — 82746 ASSAY OF FOLIC ACID SERUM: CPT

## 2023-04-17 PROCEDURE — 93325 DOPPLER ECHO COLOR FLOW MAPG: CPT

## 2023-04-17 PROCEDURE — U0005: CPT

## 2023-04-17 PROCEDURE — 84295 ASSAY OF SERUM SODIUM: CPT

## 2023-04-17 PROCEDURE — 93320 DOPPLER ECHO COMPLETE: CPT

## 2023-04-17 PROCEDURE — 85730 THROMBOPLASTIN TIME PARTIAL: CPT

## 2023-04-17 PROCEDURE — 96374 THER/PROPH/DIAG INJ IV PUSH: CPT

## 2023-04-17 PROCEDURE — 99291 CRITICAL CARE FIRST HOUR: CPT | Mod: 25

## 2023-04-17 PROCEDURE — 83036 HEMOGLOBIN GLYCOSYLATED A1C: CPT

## 2023-04-17 PROCEDURE — 85014 HEMATOCRIT: CPT

## 2023-04-17 PROCEDURE — U0003: CPT

## 2023-04-17 PROCEDURE — C8929: CPT

## 2023-04-17 PROCEDURE — 82435 ASSAY OF BLOOD CHLORIDE: CPT

## 2023-04-17 RX ORDER — IPRATROPIUM/ALBUTEROL SULFATE 18-103MCG
3 AEROSOL WITH ADAPTER (GRAM) INHALATION
Qty: 360 | Refills: 0
Start: 2023-04-17 | End: 2023-05-16

## 2023-04-17 RX ORDER — SACUBITRIL AND VALSARTAN 24; 26 MG/1; MG/1
1 TABLET, FILM COATED ORAL
Qty: 60 | Refills: 0
Start: 2023-04-17 | End: 2023-05-16

## 2023-04-17 RX ORDER — METOPROLOL TARTRATE 50 MG
1 TABLET ORAL
Refills: 0 | DISCHARGE

## 2023-04-17 RX ORDER — DRONEDARONE 400 MG/1
1 TABLET, FILM COATED ORAL
Qty: 60 | Refills: 0
Start: 2023-04-17 | End: 2023-05-16

## 2023-04-17 RX ORDER — SPIRONOLACTONE 25 MG/1
1 TABLET, FILM COATED ORAL
Qty: 30 | Refills: 0
Start: 2023-04-17 | End: 2023-05-16

## 2023-04-17 RX ORDER — SENNA PLUS 8.6 MG/1
2 TABLET ORAL
Qty: 28 | Refills: 0
Start: 2023-04-17 | End: 2023-04-30

## 2023-04-17 RX ORDER — POTASSIUM CHLORIDE 20 MEQ
1 PACKET (EA) ORAL
Refills: 0 | DISCHARGE

## 2023-04-17 RX ADMIN — SACUBITRIL AND VALSARTAN 1 TABLET(S): 24; 26 TABLET, FILM COATED ORAL at 16:27

## 2023-04-17 RX ADMIN — Medication 3 MILLILITER(S): at 16:07

## 2023-04-17 RX ADMIN — SPIRONOLACTONE 25 MILLIGRAM(S): 25 TABLET, FILM COATED ORAL at 05:40

## 2023-04-17 RX ADMIN — APIXABAN 5 MILLIGRAM(S): 2.5 TABLET, FILM COATED ORAL at 05:39

## 2023-04-17 RX ADMIN — APIXABAN 5 MILLIGRAM(S): 2.5 TABLET, FILM COATED ORAL at 16:27

## 2023-04-17 RX ADMIN — Medication 1: at 11:49

## 2023-04-17 RX ADMIN — Medication 20 MILLIGRAM(S): at 05:40

## 2023-04-17 RX ADMIN — Medication 3 MILLILITER(S): at 08:45

## 2023-04-17 RX ADMIN — DRONEDARONE 400 MILLIGRAM(S): 400 TABLET, FILM COATED ORAL at 05:40

## 2023-04-17 RX ADMIN — Medication 1 SPRAY(S): at 16:29

## 2023-04-17 RX ADMIN — Medication 40 MILLIGRAM(S): at 05:40

## 2023-04-17 RX ADMIN — DRONEDARONE 400 MILLIGRAM(S): 400 TABLET, FILM COATED ORAL at 16:27

## 2023-04-17 RX ADMIN — Medication 1 SPRAY(S): at 05:41

## 2023-04-17 RX ADMIN — Medication 3 MILLILITER(S): at 08:46

## 2023-04-17 RX ADMIN — SACUBITRIL AND VALSARTAN 1 TABLET(S): 24; 26 TABLET, FILM COATED ORAL at 05:39

## 2023-04-17 NOTE — PROGRESS NOTE ADULT - PROBLEM SELECTOR PROBLEM 3
Acute on chronic respiratory failure with hypoxia
Acute on chronic respiratory failure with hypoxia
Bradycardia
Obesity hypoventilation syndrome
Obesity hypoventilation syndrome
Acute on chronic respiratory failure with hypoxia
Bradycardia
Acute on chronic respiratory failure with hypoxia
Obesity hypoventilation syndrome
Acute on chronic respiratory failure with hypoxia

## 2023-04-17 NOTE — PROGRESS NOTE ADULT - PROBLEM SELECTOR PLAN 3
Likely combination of CHF and COPD exacerbation  Continue w/ close monitoring of serial ABGs, remains hypercapnic.
Improving  Likely combination of CHF and COPD exacerbation  Now on home dose of NC  On Prednisone 40mg daily  Pulm recs appreciated. F/u with Dr. Martinez as outpt.
Improving  Likely combination of CHF and COPD exacerbation  Now on home dose of NC  On Prednisone 40mg daily  Pulm recs appreciated. F/u with Dr. Martinez as outpt.
Likely combination of CHF and COPD exacerbation  Continue w/ close monitoring of serial ABGs, remains hypercapnic.
Likely combination of CHF and COPD exacerbation  Continue CPAP w/ close monitoring of serial ABGs
Likely combination of CHF and COPD exacerbation  Continue w/ close monitoring of serial ABGs, remains hypercapnic.
Likely combination of CHF and COPD exacerbation  Continue CPAP w/ close monitoring of serial ABGs
Likely combination of CHF and COPD exacerbation  Continue CPAP w/ close monitoring of serial ABGs
Improving  Likely combination of CHF and COPD exacerbation  On Prednisone 40mg daily
Likely combination of CHF and COPD exacerbation  Continue CPAP w/ close monitoring of serial ABGs
Likely combination of CHF and COPD exacerbation  Continue w/ close monitoring of serial ABGs, remains hypercapnic.

## 2023-04-17 NOTE — PROGRESS NOTE ADULT - PROBLEM SELECTOR PROBLEM 2
Acute on chronic diastolic congestive heart failure
Atrial flutter
COPD without exacerbation
COPD without exacerbation
Acute on chronic respiratory failure with hypoxia
Atrial flutter
Acute on chronic diastolic congestive heart failure
Atrial flutter

## 2023-04-17 NOTE — DISCHARGE NOTE NURSING/CASE MANAGEMENT/SOCIAL WORK - NSDCPEFALRISK_GEN_ALL_CORE
For information on Fall & Injury Prevention, visit: [email or web address] OR  [email or web address] OR  https://www.cdc.gov/steadi/patient.html

## 2023-04-17 NOTE — PROGRESS NOTE ADULT - PROBLEM SELECTOR PLAN 1
LVEF 55-60%, severe biatrial enlargement, moderate TR   Multiple recent HF related hospitalizations. Could be exacerbated by new onset aflutter/bradycardia. S/p GRZEGORZ/DCCV 4/13.   Diuretics: Reduce torsemide 20mg PO daily.  GDMT: Continue Entresto 24-26mg BID and Spironolactone 25 mg daily. Would benefit from SGLT2i - can attempt to add prior to discharge if renal function remains stable.  Low Na+, 1.5L FR diet advised.  Please document strict I&O and daily standing weights.  Would likely benefit from CardioMEMs device to avoid recurrent hospitalizations if patient can prove compliance. LVEF 55-60%, severe biatrial enlargement, moderate TR   Multiple recent HF related hospitalizations. Could be exacerbated by new onset aflutter/bradycardia. S/p GRZEGORZ/DCCV 4/13.   Diuretics: Torsemide 20mg PO daily. Weight today 283lbs.  GDMT: Continue Entresto 24-26mg BID and Spironolactone 25 mg daily. Would benefit from SGLT2i - can add as outpt. Currently not on BB (will defer to Dr. Pang given h/o COPD).  Low Na+, 1.5L FR diet advised.  Please document strict I&O and daily standing weights.  Would likely benefit from CardioMEMs device to avoid recurrent hospitalizations if patient can prove compliance.  Stable for discharge from a HF perspective. Can f/u in our [place] office.

## 2023-04-17 NOTE — PROGRESS NOTE ADULT - TIME BILLING
Time spent at bedside interviewing and examining the patient, reviewing the chart and telemetry, and coordinating care with the primary team.  I counselled patient on the heart failure disease process and the ongoing medical therapy.
D/W pt, daughter, RN, hospitalist Dr Tao    Total time also includes discussion during interdisciplinary team rounds, chart review, review of medications/ labs/ imaging, examination, care coordination with other health care professionals, documentation.
D/W pt, RN, NP STEVEN Driscoll    Total time also includes discussion during interdisciplinary team rounds, chart review, review of medications/ labs/ imaging, examination, care coordination with other health care professionals, documentation.
D/W pt, RN, NP STEVEN Driscoll    Total time also includes discussion during interdisciplinary team rounds, chart review, review of medications/ labs/ imaging, examination, care coordination with other health care professionals, documentation.
Time spent at bedside interviewing and examining the patient, reviewing the chart and telemetry, and coordinating care with the primary team.  I counselled patient on the heart failure disease process and the ongoing medical therapy.
- Review of notes/records, telemetry, vital signs and daily labs.   - General and cardiovascular physical examination.  - Generation of cardiovascular treatment plan.  - Coordination of care with primary team.
- Review of notes/records, telemetry, vital signs and daily labs.   - General and cardiovascular physical examination.  - Generation of cardiovascular treatment plan.  - Coordination of care with primary team.
Time spent at bedside interviewing and examining the patient, reviewing the chart and telemetry, and coordinating care with the primary team.  I counselled patient on the heart failure disease process and the ongoing medical therapy.
greater than 50% of time spent reviewing labs, notes, orders and radiographs, coordinating care  discussed with pt, nursing, med team
Time spent at bedside interviewing and examining the patient, reviewing the chart and telemetry, and coordinating care with the primary team.  I counselled patient on the heart failure disease process and the ongoing medical therapy.
Time spent at bedside interviewing and examining the patient, reviewing the chart and telemetry, and coordinating care with the primary team.  I counselled patient on the heart failure disease process and the ongoing medical therapy.
- Generation of cardiovascular treatment plan.  - Coordination of care with primary team.

## 2023-04-17 NOTE — PROGRESS NOTE ADULT - SUBJECTIVE AND OBJECTIVE BOX
NOTE INCOMPLETE    [facility] Advanced Heart Failure - Progress Note  [address] [place] [address]  Office Phone: [phone]/Fax: [phone]  Service/On Call Phone [phone]    Subjective/Objective:    Medications:  albuterol/ipratropium for Nebulization 3 milliLiter(s) Nebulizer every 6 hours  albuterol/ipratropium for Nebulization 3 milliLiter(s) Nebulizer every 3 hours PRN  apixaban 5 milliGRAM(s) Oral every 12 hours  bisacodyl Suppository 10 milliGRAM(s) Rectal daily PRN  dextrose 5%. 1000 milliLiter(s) IV Continuous <Continuous>  dextrose 5%. 1000 milliLiter(s) IV Continuous <Continuous>  dextrose 50% Injectable 25 Gram(s) IV Push once  dextrose 50% Injectable 25 Gram(s) IV Push once  dextrose 50% Injectable 12.5 Gram(s) IV Push once  dextrose Oral Gel 15 Gram(s) Oral once PRN  dronedarone 400 milliGRAM(s) Oral two times a day  glucagon  Injectable 1 milliGRAM(s) IntraMuscular once  insulin lispro (ADMELOG) corrective regimen sliding scale   SubCutaneous at bedtime  insulin lispro (ADMELOG) corrective regimen sliding scale   SubCutaneous three times a day before meals  melatonin 3 milliGRAM(s) Oral at bedtime PRN  polyethylene glycol 3350 17 Gram(s) Oral daily  predniSONE   Tablet 40 milliGRAM(s) Oral daily  sacubitril 24 mG/valsartan 26 mG 1 Tablet(s) Oral two times a day  senna 2 Tablet(s) Oral at bedtime  sodium chloride 0.65% Nasal 1 Spray(s) Both Nostrils three times a day  spironolactone 25 milliGRAM(s) Oral daily  torsemide 20 milliGRAM(s) Oral daily    Vital Signs Last 24 Hours  T(C): 36.6 (23 @ 05:25), Max: 36.8 (23 @ 20:23)  HR: 84 (23 @ 05:25) (84 - 98)  BP: 108/66 (23 @ 05:25) (104/68 - 115/65)  RR: 18 (23 @ 05:25) (18 - 18)  SpO2: 95% (23 @ 05:52) (95% - 98%)    Weight in k.3 ( @ 09:40)    I&O's Summary  2023 07:01  -  2023 07:00  --------------------------------------------------------  IN: 707 mL / OUT: 0 mL / NET: 707 mL    Tele:    Physical Exam:  General: In no distress.   HEENT: EOMs intact.  Neck: Neck supple. JVP not elevated.   Chest: Clear to auscultation bilaterally.  CV: RRR. Normal S1 and S2. No murmurs, rub, or gallops. Warm peripherally.  PV: No LE edema noted. Pulses full/equal in all four extremities.  Abdomen: Soft, non-distended, non-tender.  Skin: warm, dry, no rash.  Neurology: Alert and oriented times three. Sensation intact.  Psych: Appropriate affect.    Labs:                        15.1   14.29 )-----------( 88       ( 2023 06:00 )             47.4     -17    136  |  92<L>  |  35.6<H>  ----------------------------<  83  4.2   |  33.0<H>  |  1.12    Ca    8.8      2023 06:00  Phos  3.4     04-16  Mg     2.3                              [facility] Advanced Heart Failure - Progress Note  [address] [place] [address]  Office Phone: [phone]/Fax: [phone]  Service/On Call Phone [phone]    Subjective/Objective: NAEO. Pt denies overt HF symptoms. Pending discharge home.    Medications:  albuterol/ipratropium for Nebulization 3 milliLiter(s) Nebulizer every 6 hours  albuterol/ipratropium for Nebulization 3 milliLiter(s) Nebulizer every 3 hours PRN  apixaban 5 milliGRAM(s) Oral every 12 hours  bisacodyl Suppository 10 milliGRAM(s) Rectal daily PRN  dextrose 5%. 1000 milliLiter(s) IV Continuous <Continuous>  dextrose 5%. 1000 milliLiter(s) IV Continuous <Continuous>  dextrose 50% Injectable 25 Gram(s) IV Push once  dextrose 50% Injectable 25 Gram(s) IV Push once  dextrose 50% Injectable 12.5 Gram(s) IV Push once  dextrose Oral Gel 15 Gram(s) Oral once PRN  dronedarone 400 milliGRAM(s) Oral two times a day  glucagon  Injectable 1 milliGRAM(s) IntraMuscular once  insulin lispro (ADMELOG) corrective regimen sliding scale   SubCutaneous at bedtime  insulin lispro (ADMELOG) corrective regimen sliding scale   SubCutaneous three times a day before meals  melatonin 3 milliGRAM(s) Oral at bedtime PRN  polyethylene glycol 3350 17 Gram(s) Oral daily  predniSONE   Tablet 40 milliGRAM(s) Oral daily  sacubitril 24 mG/valsartan 26 mG 1 Tablet(s) Oral two times a day  senna 2 Tablet(s) Oral at bedtime  sodium chloride 0.65% Nasal 1 Spray(s) Both Nostrils three times a day  spironolactone 25 milliGRAM(s) Oral daily  torsemide 20 milliGRAM(s) Oral daily    Vital Signs Last 24 Hours  T(C): 36.6 (23 @ 05:25), Max: 36.8 (23 @ 20:23)  HR: 84 (23 @ 05:25) (84 - 98)  BP: 108/66 (23 @ 05:25) (104/68 - 115/65)  RR: 18 (23 @ 05:25) (18 - 18)  SpO2: 95% (23 @ 05:52) (95% - 98%)    Weight in k.3 ( @ 09:40)    I&O's Summary  2023 07:01  -  2023 07:00  --------------------------------------------------------  IN: 707 mL / OUT: 0 mL / NET: 707 mL    Tele: SR/ST    Physical Exam:  General: Sitting up in chair, in no distress.  HEENT: EOMs intact.  Neck: Neck supple. JVP not significantly elevated.   Chest: Clear to auscultation bilaterally.  CV: RRR. Normal S1 and S2. No murmurs, rub, or gallops. Cool lower extremities  PV: Trace pretibial edema noted. Pulses full/equal in all four extremities.  Abdomen: Soft, obese.  Skin: dry, generalized ecchymosis.  Neurology: Alert and oriented times three. Sensation intact.  Psych: Appropriate affect.    Labs:                        15.1   14.29 )-----------( 88       ( 2023 06:00 )             47.4     17    136  |  92<L>  |  35.6<H>  ----------------------------<  83  4.2   |  33.0<H>  |  1.12    Ca    8.8      2023 06:00  Phos  3.4     -16  Mg     2.3         GRZEGORZ Echo Doppler (23 @ 13:29)   Summary:   1. This is a Pre-Ablation GRZEGORZ or Atrial Flutter.   2. This is a limited GRZEGORZ which was done in order to avoid risk of   respiratory depression as per assessment of anesthesiology.   3. Left ventricular ejection fraction, by visual estimation, is 35 to   40%.   4. Mildly to moderately decreased global left ventricular systolic   function.   5. Left atrial enlargement.   6. Moderately enlarged right ventricle.   7. Thickening of the anterior and posterior mitral valve leaflets.   8. Trace mitral valve regurgitation.   9. Mild tricuspid regurgitation.  10. Sclerotic aortic valve with normal opening.  11. Rhythm is confirmed with Atrial Flutter with RVR. DCCV 200 J x1   applied with successful cardiversion to Sinus Rhythm. Pt tolerated   proecdure well. No complication noted.    < end of copied text >

## 2023-04-17 NOTE — PROGRESS NOTE ADULT - SUBJECTIVE AND OBJECTIVE BOX
Patient reports improved breathing    Heart failure    Handoff    MEWS Score    Congestive heart failure (CHF)    COPD (chronic obstructive pulmonary disease)    DM (diabetes mellitus)    HTN (hypertension)    BPH (benign prostatic hyperplasia)    Transesophageal echocardiography with cardioversion    CHF exacerbation    Acute on chronic diastolic congestive heart failure    Bradycardia    Atrial flutter    Acute on chronic respiratory failure with hypoxia    COPD without exacerbation    HTN (hypertension)    T2DM (type 2 diabetes mellitus)    Acute on chronic respiratory failure with hypoxia and hypercapnia    Obesity hypoventilation syndrome    CHALO (obstructive sleep apnea)    S/P cholecystectomy    History of appendectomy    LEG SWELLING    New onset atrial flutter    Bradycardia    [email or web address]        albuterol/ipratropium for Nebulization 3 milliLiter(s) Nebulizer every 3 hours PRN  albuterol/ipratropium for Nebulization 3 milliLiter(s) Nebulizer every 6 hours  apixaban 5 milliGRAM(s) Oral every 12 hours  bisacodyl Suppository 10 milliGRAM(s) Rectal daily PRN  dextrose 5%. 1000 milliLiter(s) IV Continuous <Continuous>  dextrose 5%. 1000 milliLiter(s) IV Continuous <Continuous>  dextrose 50% Injectable 12.5 Gram(s) IV Push once  dextrose 50% Injectable 25 Gram(s) IV Push once  dextrose 50% Injectable 25 Gram(s) IV Push once  dextrose Oral Gel 15 Gram(s) Oral once PRN  dronedarone 400 milliGRAM(s) Oral two times a day  glucagon  Injectable 1 milliGRAM(s) IntraMuscular once  insulin lispro (ADMELOG) corrective regimen sliding scale   SubCutaneous three times a day before meals  insulin lispro (ADMELOG) corrective regimen sliding scale   SubCutaneous at bedtime  melatonin 3 milliGRAM(s) Oral at bedtime PRN  polyethylene glycol 3350 17 Gram(s) Oral daily  predniSONE   Tablet 40 milliGRAM(s) Oral daily  sacubitril 24 mG/valsartan 26 mG 1 Tablet(s) Oral two times a day  senna 2 Tablet(s) Oral at bedtime  sodium chloride 0.65% Nasal 1 Spray(s) Both Nostrils three times a day  spironolactone 25 milliGRAM(s) Oral daily  torsemide 20 milliGRAM(s) Oral daily      T(C): 36.7 (04-17-23 @ 10:15), Max: 36.8 (04-16-23 @ 20:23)  HR: 85 (04-17-23 @ 10:15) (84 - 98)  BP: 100/60 (04-17-23 @ 10:15) (100/60 - 115/65)  RR: 18 (04-17-23 @ 10:15) (18 - 18)  SpO2: 93% (04-17-23 @ 10:15) (93% - 98%)    04-16-23 @ 07:01  -  04-17-23 @ 07:00  --------------------------------------------------------  IN: 707 mL / OUT: 0 mL / NET: 707 mL        Gen: no distress  CV: normal S1 and S2, 2+ edema  Resp: Lungs CTA    04-17    136  |  92<L>  |  35.6<H>  ----------------------------<  83  4.2   |  33.0<H>  |  1.12    Ca    8.8      17 Apr 2023 06:00  Phos  3.4     04-16  Mg     2.3     04-17                          15.1   14.29 )-----------( 88       ( 17 Apr 2023 06:00 )             47.4

## 2023-04-17 NOTE — PROGRESS NOTE ADULT - REASON FOR ADMISSION
Ankle swelling and hard time breathing
Ankle swelling, dyspnea
Acute resp failure 2/2 acute CHF
Ankle swelling and hard time breathing
SOB, edema
Ankle swelling and hard time breathing
Ankle swelling and hard time breathing

## 2023-04-17 NOTE — PROGRESS NOTE ADULT - NS ATTEND AMEND GEN_ALL_CORE FT
Remains in atrial flutter with controlled rates with occasional rapid rates. Will require ablation vs cardioversion prior to discharge once optimized from a pulmonary standpoint.
A
Patient was seen and examined at bedside with the advanced care provider.  I agree with the above with the following exceptions:    Planned for GRZEGORZ/DCCV today.  JVP laying flat appears to be about 8-10cm.  Continue on current cardiac medications.  Once Romero removed would start on Farxiga and decrease torsemide to 20mg daily.  K today 4.7.  Can continue on spironolactone at current dose.
Pt is currently on bipap due to worsening ABG and somnolence.  Overnight UO was not measured. Clinically looks same as yesterday. He is alert and answering questions with bipap mask on.  May need Diamox 500mg IV BID if ok with pulmonary. Continue Lasix 80mg IV BID. Should aim for 24h I/O balance  –1 to 2 liters. Can consider bumex gtt if this goal is not achieved with current regimen.  We will continue to follow.
82 y/o male, former smoker (quit 7/2022), with h/o chronic HFpEF ACC/AHA stage C, COPD on home O2 (4 LPM via NC) since July 2022, DM2, DVT on Eliquis who was referred from Dr. Booker's office due to ADHF. Patient has been diursed and is ready for discharge  Will send him home on entresto 24/26mg BID, spironolatone 25mg daily and torsemide 20mg daily  As an outpatient will start an SGLT2i
Patient was seen and examined at bedside with the advanced care provider.  I agree with the above with the following exceptions:    s/p GRZEGORZ/DCCV yesterday.  Having higher HR at times to 120s.  Per EP holding AV theresa blockers now on dronedarone.  If HR remains elevated, can reassess with EP.  JVP low on exam with elevated Cr.  Will decrease torsemide to 20mg daily.  WBC increase from 8 to 12 today.  Romero has been removed.  If no signs of infection, can start on Farxiga 10mg daily.
Excellent diuresis yesterday. Pt is more awake and alert.   His bicarb continues to rise. He received lasix and a dose of bumex.   Should downtitrate diuretics (stop bumex) due to worsening alkalosis.   Continue GDMT optimization.
Patient was seen and examined at bedside with the advanced care provider.  I agree with the above with the following exceptions:    Pending afib ablation this week.  On exam he has 1+ pitting edema, B/L crackles, JVP around 10m sitting upright with cool extremities.  He remains on his home baseline O2 to 3L currently.  K low this AM at 3.6.  Would start on spironolactone 25mg daily.  In addition, would favor starting maintenance diuretic with torsemide 20mg BID.  He was on lasix 4mg PO BID at home however, torsemide may have a better efficacy in the setting of possible gut edema.

## 2023-04-17 NOTE — PROGRESS NOTE ADULT - ASSESSMENT
83y/oM PMH HTN, DM, COPD on home O2 3LNC, CHF, DVT on Eliquis presenting with increased dyspnea, edema, found to have atrial flutter and heart failure exacerbation.    Acute on chronic respiratory failure with hypoxia and hypercapnia, multifactorial due to acute on chronic HFpEF and COPD exacerbation, CHALO/OHS, severe PH with cor pulmonale   -TTE: LVF 55-60%, severe pHTN   -cont supplemental O2, (baseline 3LNC)   -IS   -cardio, HF recs appreciated   -pulm recs appreciated   -s/p IV steroids, now on PO prednisone taper   -nebs   -s/p IV lasix, metolazone   -cont PO torsemide, spironolactone, entresto   -add SGLT2i if renal function stable   -fluid restriction 1.5L   -strict I/Os   -daily weights   -nocturnal bipap compliance encouraged     Atrial flutter with slow ventricular response   -now s/p cardioversion 4/13   -cont dronedarone   -f/u Dr. Pang 1-2 weeks   -cont tele monitoring for intermittent episodes ST     BRANDEN   -cont to monitor on diuretics     HTN   -entresto     DM2   -a1c 5.9  -ISS     Thrombocytopenia   -also seen on prior records from [facility], cont to monitor   -outpt f/u heme/onc     vte ppx: eliquis     dispo: home w/home pt

## 2023-04-17 NOTE — PROGRESS NOTE ADULT - NS ATTEND OPT1 GEN_ALL_CORE
I attest my time as attending is greater than 50% of the total combined time spent on qualifying patient care activities by the PA/NP and attending.
I independently performed the documented:
I attest my time as attending is greater than 50% of the total combined time spent on qualifying patient care activities by the PA/NP and attending.
I attest my time as attending is greater than 50% of the total combined time spent on qualifying patient care activities by the PA/NP and attending.
I independently performed the documented:
I attest my time as attending is greater than 50% of the total combined time spent on qualifying patient care activities by the PA/NP and attending.
I attest my time as attending is greater than 50% of the total combined time spent on qualifying patient care activities by the PA/NP and attending.
I independently performed the documented:

## 2023-04-17 NOTE — PROGRESS NOTE ADULT - ASSESSMENT
- New onset atrial flutter with tachy/renetta syndrome - s/p spontaneous conversion to sinus rhythm   - Respiratory failure with CO2- improved    - Acute on chronic CHF: EF 35-40% by GRZEGORZ. Improving fluid status    Plan  Continue Eliquis 5mg BID  Continue dronedarone 400mg BID  Continue torsemide, spironolactone, entresto  OK to discharge patient from our perspective  Follow up in 1 to 2 weeks.   For EP care, patient is to follow up with me as well

## 2023-04-17 NOTE — PROGRESS NOTE ADULT - SUBJECTIVE AND OBJECTIVE BOX
LAINE [facility]    [id number]    83y      Male    CC: sob    INTERVAL HPI/OVERNIGHT EVENTS: pt seen and examined. no acute events reported o/n     REVIEW OF SYSTEMS:    CONSTITUTIONAL: No fever, weight loss  RESPIRATORY: No wheezing, hemoptysis  CARDIOVASCULAR: No chest pain, palpitations  GASTROINTESTINAL: No abdominal or epigastric pain. No nausea, vomiting  NEUROLOGICAL: No headaches    Vital Signs Last 24 Hrs  T(C): 36.7 (17 Apr 2023 10:15), Max: 36.8 (16 Apr 2023 20:23)  T(F): 98.1 (17 Apr 2023 10:15), Max: 98.3 (16 Apr 2023 20:23)  HR: 85 (17 Apr 2023 10:15) (84 - 98)  BP: 100/60 (17 Apr 2023 10:15) (100/60 - 115/65)  BP(mean): --  RR: 18 (17 Apr 2023 10:15) (18 - 18)  SpO2: 93% (17 Apr 2023 10:15) (91% - 98%)    Parameters below as of 17 Apr 2023 10:15  Patient On (Oxygen Delivery Method): nasal cannula  O2 Flow (L/min): 3      PHYSICAL EXAM:    GENERAL: NAD  HEENT: atraumatic   NECK: soft, supple  CHEST/LUNG: decreased sounds at bases, respirations unlabored on NC  HEART: S1S2+, Regular rate and rhythm  ABDOMEN: Soft, Nontender, Nondistended; Bowel sounds present  SKIN: warm, dry; BLE chronic skin changes   NEURO: Awake, alert; grossly non-focal   PSYCH: calm, cooperative     LABS:                        15.1   14.29 )-----------( 88       ( 17 Apr 2023 06:00 )             47.4     04-17    136  |  92<L>  |  35.6<H>  ----------------------------<  83  4.2   |  33.0<H>  |  1.12    Ca    8.8      17 Apr 2023 06:00  Phos  3.4     04-16  Mg     2.3     04-17              MEDICATIONS  (STANDING):  albuterol/ipratropium for Nebulization 3 milliLiter(s) Nebulizer every 6 hours  apixaban 5 milliGRAM(s) Oral every 12 hours  dextrose 5%. 1000 milliLiter(s) (100 mL/Hr) IV Continuous <Continuous>  dextrose 5%. 1000 milliLiter(s) (50 mL/Hr) IV Continuous <Continuous>  dextrose 50% Injectable 25 Gram(s) IV Push once  dextrose 50% Injectable 25 Gram(s) IV Push once  dextrose 50% Injectable 12.5 Gram(s) IV Push once  dronedarone 400 milliGRAM(s) Oral two times a day  glucagon  Injectable 1 milliGRAM(s) IntraMuscular once  insulin lispro (ADMELOG) corrective regimen sliding scale   SubCutaneous at bedtime  insulin lispro (ADMELOG) corrective regimen sliding scale   SubCutaneous three times a day before meals  polyethylene glycol 3350 17 Gram(s) Oral daily  sacubitril 24 mG/valsartan 26 mG 1 Tablet(s) Oral two times a day  senna 2 Tablet(s) Oral at bedtime  sodium chloride 0.65% Nasal 1 Spray(s) Both Nostrils three times a day  spironolactone 25 milliGRAM(s) Oral daily  torsemide 20 milliGRAM(s) Oral daily    MEDICATIONS  (PRN):  albuterol/ipratropium for Nebulization 3 milliLiter(s) Nebulizer every 3 hours PRN Shortness of Breath and/or Wheezing  bisacodyl Suppository 10 milliGRAM(s) Rectal daily PRN Constipation  dextrose Oral Gel 15 Gram(s) Oral once PRN Blood Glucose LESS THAN 70 milliGRAM(s)/deciliter  melatonin 3 milliGRAM(s) Oral at bedtime PRN Insomnia      RADIOLOGY & ADDITIONAL TESTS:

## 2023-04-17 NOTE — PROGRESS NOTE ADULT - ASSESSMENT
Primary Cardiology: [facility]  HF Cardiologist: Dr. Carreon    84 y/o male, former smoker (quit 7/2022), with h/o chronic HFpEF ACC/AHA stage C, COPD on home O2 (4 LPM via NC) since July 2022, DM2, DVT on Eliquis who was referred from Dr. Booker's office due to ADHF.   Upon arrival to the ED he was found to have newly diagnosed Aflutter with variable block (VR 30-40s). His BP was stable and he denied symptoms associated to bradycardia. His labs were remarkable for elevated pBNP, normal trop, thrombocytopenia and elevated CO2. His CxR showed pulmonary congestion. He was given IV lasix, glucagon and calcium. His AV node blocker agents were held.   This is his 3rd or 4th hospitalization in the past 12 months. He has been admitted multiple times to [facility].     Now off continuous BiPAP with improving volume status and renal function.   Underwent GRZEGORZ/DCCV 4/13.  Started on Mutaq per EP.     Cardiac Studies:  TTE 4/5/23: LVEF 55-60%, LVIDd 5.47cm, severe RA and RV enlargement, trace MR, moderate TR, PASP 61.8 mmHg (RA 15 mmHg). Primary Cardiology: [facility]  HF Cardiologist: Dr. Carreon    84 y/o male, former smoker (quit 7/2022), with h/o chronic HFpEF ACC/AHA stage C, COPD on home O2 (4 LPM via NC) since July 2022, DM2, DVT on Eliquis who was referred from Dr. Booker's office due to ADHF.   Upon arrival to the ED he was found to have newly diagnosed Aflutter with variable block (VR 30-40s). His BP was stable and he denied symptoms associated to bradycardia. His labs were remarkable for elevated pBNP, normal trop, thrombocytopenia and elevated CO2. His CxR showed pulmonary congestion. He was given IV lasix, glucagon and calcium. His AV node blocker agents were held.   This is his 3rd or 4th hospitalization in the past 12 months. He has been admitted multiple times to [facility].     Now off continuous BiPAP with improving volume status and renal function.   Underwent GRZEGORZ/DCCV 4/13 (EF 35%).  Started on Mutaq per EP.     Cardiac Studies:  TTE 4/5/23: LVEF 55-60%, LVIDd 5.47cm, severe RA and RV enlargement, trace MR, moderate TR, PASP 61.8 mmHg (RA 15 mmHg).

## 2023-04-17 NOTE — PROGRESS NOTE ADULT - PROBLEM SELECTOR PLAN 2
Persistent w/ variable block which has improved off AVN blocking agent  Now s/p GRZEGORZ/DCCV 4/13  Initiated on Multaq per EP - should follow renal function closely  Having intermittent episodes of ST. May need to consider low dose BB.  Would likely benefit from ablation as outpt - pt to f/u with Dr. Mendoza   AC: Eliquis 5mg BID Persistent w/ variable block which has improved off AVN blocking agent  Now s/p GRZEGORZ/DCCV 4/13  Initiated on Multaq per EP - should follow renal function closely  Having intermittent episodes of ST. May need to consider low dose BB.  Would likely benefit from ablation as outpt - pt to f/u with Dr. Pang  AC: Eliquis 5mg BID

## 2023-04-17 NOTE — PROGRESS NOTE ADULT - PROBLEM SELECTOR PROBLEM 1
Acute on chronic diastolic congestive heart failure
Acute on chronic diastolic congestive heart failure
Acute on chronic respiratory failure with hypoxia and hypercapnia
Acute on chronic diastolic congestive heart failure
Acute on chronic respiratory failure with hypoxia and hypercapnia
Acute on chronic diastolic congestive heart failure
Atrial flutter
Atrial flutter
Acute on chronic diastolic congestive heart failure

## 2023-04-17 NOTE — PROGRESS NOTE ADULT - PROVIDER SPECIALTY LIST ADULT
Cardiology
Hospitalist
Cardiology
Cardiology
Electrophysiology
Hospitalist
Palliative Care
Pulmonology
Pulmonology
Cardiology
Electrophysiology
Hospitalist
Palliative Care
Cardiology
Hospitalist
Pulmonology
Pulmonology
Heart Failure
Palliative Care
Cardiology
Heart Failure
Electrophysiology
Heart Failure
Pulmonology
Heart Failure
Cardiology
Heart Failure

## 2023-04-17 NOTE — DISCHARGE NOTE NURSING/CASE MANAGEMENT/SOCIAL WORK - NSDCFUADDAPPT_GEN_ALL_CORE_FT
Patient is on AI List.  You have a prescheduled appointment with your PCP, Dr. Diaz, on 5/8/23 at 5:15pm. Please call [phone] if you need to reschedule.

## 2023-04-17 NOTE — DISCHARGE NOTE NURSING/CASE MANAGEMENT/SOCIAL WORK - PATIENT PORTAL LINK FT
You can access the FollowMyHealth Patient Portal offered by [facility] by registering at the following website: [email or web address]. By joining [email or web address]’s FollowMyHealth portal, you will also be able to view your health information using other applications (apps) compatible with our system.

## 2023-04-18 PROBLEM — I10 ESSENTIAL (PRIMARY) HYPERTENSION: Chronic | Status: ACTIVE | Noted: 2023-04-05

## 2023-04-18 PROBLEM — Z00.00 ENCOUNTER FOR PREVENTIVE HEALTH EXAMINATION: Status: ACTIVE | Noted: 2023-04-18

## 2023-04-18 PROBLEM — N40.0 BENIGN PROSTATIC HYPERPLASIA WITHOUT LOWER URINARY TRACT SYMPTOMS: Chronic | Status: ACTIVE | Noted: 2023-04-05

## 2023-04-18 PROBLEM — E11.9 TYPE 2 DIABETES MELLITUS WITHOUT COMPLICATIONS: Chronic | Status: ACTIVE | Noted: 2023-04-05

## 2023-04-24 PROBLEM — I50.9 HEART FAILURE, UNSPECIFIED: Chronic | Status: ACTIVE | Noted: 2023-04-05

## 2023-04-24 PROBLEM — J44.9 CHRONIC OBSTRUCTIVE PULMONARY DISEASE, UNSPECIFIED: Chronic | Status: ACTIVE | Noted: 2023-04-05

## 2023-04-27 DIAGNOSIS — J96.21 ACUTE AND CHRONIC RESPIRATORY FAILURE WITH HYPOXIA: ICD-10-CM

## 2023-04-27 DIAGNOSIS — N40.0 BENIGN PROSTATIC HYPERPLASIA WITHOUT LOWER URINARY TRACT SYMPMS: ICD-10-CM

## 2023-04-27 DIAGNOSIS — E66.2 MORBID (SEVERE) OBESITY WITH ALVEOLAR HYPOVENTILATION: ICD-10-CM

## 2023-04-27 DIAGNOSIS — R00.1 BRADYCARDIA, UNSPECIFIED: ICD-10-CM

## 2023-04-27 DIAGNOSIS — I50.9 HEART FAILURE, UNSPECIFIED: ICD-10-CM

## 2023-04-27 DIAGNOSIS — Z87.891 PERSONAL HISTORY OF NICOTINE DEPENDENCE: ICD-10-CM

## 2023-04-27 RX ORDER — DRONEDARONE 400 MG/1
400 TABLET, FILM COATED ORAL
Qty: 180 | Refills: 3 | Status: ACTIVE | COMMUNITY
Start: 2023-04-27

## 2023-04-27 RX ORDER — APIXABAN 5 MG/1
5 TABLET, FILM COATED ORAL
Qty: 180 | Refills: 1 | Status: ACTIVE | COMMUNITY
Start: 2023-04-27

## 2023-04-27 RX ORDER — TAMSULOSIN HYDROCHLORIDE 0.4 MG/1
0.4 CAPSULE ORAL AT BEDTIME
Refills: 0 | Status: ACTIVE | COMMUNITY
Start: 2023-04-27

## 2023-04-27 RX ORDER — IPRATROPIUM BROMIDE AND ALBUTEROL SULFATE 2.5; .5 MG/3ML; MG/3ML
0.5-2.5 (3) SOLUTION RESPIRATORY (INHALATION) 4 TIMES DAILY
Refills: 0 | Status: ACTIVE | COMMUNITY
Start: 2023-04-27

## 2023-04-27 RX ORDER — SENNOSIDES 8.6 MG TABLETS 8.6 MG/1
8.6 TABLET ORAL
Refills: 0 | Status: ACTIVE | COMMUNITY
Start: 2023-04-27

## 2023-04-27 RX ORDER — ALBUTEROL SULFATE 90 UG/1
108 (90 BASE) INHALANT RESPIRATORY (INHALATION)
Qty: 1 | Refills: 0 | Status: ACTIVE | COMMUNITY
Start: 2023-04-27

## 2023-05-02 ENCOUNTER — NON-APPOINTMENT (OUTPATIENT)
Age: 83
End: 2023-05-02

## 2023-05-02 ENCOUNTER — APPOINTMENT (OUTPATIENT)
Dept: HEART FAILURE | Facility: CLINIC | Age: 83
End: 2023-05-02
Payer: MEDICARE

## 2023-05-02 VITALS — WEIGHT: 289 LBS | HEIGHT: 72 IN | OXYGEN SATURATION: 95 % | HEART RATE: 82 BPM | BODY MASS INDEX: 39.14 KG/M2

## 2023-05-02 VITALS — DIASTOLIC BLOOD PRESSURE: 54 MMHG | SYSTOLIC BLOOD PRESSURE: 88 MMHG

## 2023-05-02 VITALS — DIASTOLIC BLOOD PRESSURE: 42 MMHG | SYSTOLIC BLOOD PRESSURE: 78 MMHG

## 2023-05-02 DIAGNOSIS — R60.0 LOCALIZED EDEMA: ICD-10-CM

## 2023-05-02 PROCEDURE — 99214 OFFICE O/P EST MOD 30 MIN: CPT

## 2023-05-02 PROCEDURE — 93000 ELECTROCARDIOGRAM COMPLETE: CPT

## 2023-05-02 RX ORDER — METOPROLOL SUCCINATE 100 MG/1
100 TABLET, EXTENDED RELEASE ORAL
Qty: 90 | Refills: 2 | Status: DISCONTINUED | COMMUNITY
Start: 2023-04-27 | End: 2023-05-02

## 2023-05-02 RX ORDER — POTASSIUM CHLORIDE 750 MG/1
10 CAPSULE, EXTENDED RELEASE ORAL DAILY
Refills: 0 | Status: DISCONTINUED | COMMUNITY
Start: 2023-04-27 | End: 2023-05-02

## 2023-05-03 ENCOUNTER — NON-APPOINTMENT (OUTPATIENT)
Age: 83
End: 2023-05-03

## 2023-05-03 NOTE — REASON FOR VISIT
[Cardiac Failure] : cardiac failure [FreeTextEntry1] : Primary Cardiology: [facility]\par HF Cardiologist: Dr. Carreon

## 2023-05-03 NOTE — CARDIOLOGY SUMMARY
[de-identified] : \par 5/2/23: Aflutter HR 82 bpm [de-identified] : \par TTE 4/5/23: LVEF 55-60%, LVIDd 5.47cm, severe RA and RV enlargement, trace MR, moderate TR, PASP 61.8 mmHg (RA 15 mmHg).

## 2023-05-03 NOTE — ASSESSMENT
[FreeTextEntry1] : Mr. Quintero appears clinically hypervolemic on exam today. We discussed his low BP is concerning and that is back in atrial flutter based upon EKG tracing although HR appears controlled. He currently denies any symptoms. I recommended hospital admission for management however patient is not agreeable at this time. I have advised him to stop Entresto. His daughter will assist with obtaining home BP readings and they were advised if he develops any new symptoms including but not limited to dizziness/lightheadedness he should goto immediately to the ED. Will call tomorrow to follow-up.

## 2023-05-03 NOTE — PHYSICAL EXAM
[Well Developed] : well developed [No Acute Distress] : no acute distress [Obese] : obese [Normal S1, S2] : normal S1, S2 [No Murmur] : no murmur [Clear Lung Fields] : clear lung fields [No Respiratory Distress] : no respiratory distress  [Soft] : abdomen soft [Moves all extremities] : moves all extremities [Alert and Oriented] : alert and oriented [de-identified] : JVP difficult to assess due to body habitus  [de-identified] : ambulates with walker [de-identified] : 2+ pretibial edema bilaterally (L>R) [de-identified] : warm

## 2023-05-03 NOTE — DISCUSSION/SUMMARY
[EKG obtained to assist in diagnosis and management of assessed problem(s)] : EKG obtained to assist in diagnosis and management of assessed problem(s) [FreeTextEntry1] : # ACC/AHA Stage C chronic diastolic congestive heart failure:\par LVEF ~40% on GRZEGORZ 4/13 previously 55-60%, severe biatrial enlargement, moderate TR \par Multiple recent HF related hospitalizations. Recent exacerbated likely secondary to new onset aflutter/bradycardia. S/p GRZEGORZ/DCCV 4/13.\par GDMT: Stop Entresto in view of hypotension. Continue Spironolactone 25 mg daily. Would eventually benefit from SGLT2i. Currently not on BB (will defer to Dr. Pang given h/o COPD).\par Diuretics: Torsemide 20mg PO daily. Goal weight ~283lbs. Cannot increase today due to hypotension.\par Low Na+, 1.5L FR diet advised.\par Please document strict I&O and daily standing weights.\par Would likely benefit from CardioMEMs device to avoid recurrent hospitalizations however patient would need to first prove compliance.\par \par # Atrial flutter\par AVN blockers stopped in setting of bradycardia w/ variable block\par S/p GRZEGORZ/DCCV 4/13\par Initiated on Multaq 400mg BID per EP\par EKG today reveals Aflutter HR low 80s.\par Would likely benefit from ablation as outpt - pt to f/u with Dr. Pang\par AC: Eliquis 5mg BID\par \par # Acute on chronic respiratory failure with hypoxia\par Improved\par Likely combination of CHF and COPD exacerbation\par Maintained on NC 4L\par On Prednisone\par F/u with Dr. Martinez as outpt.

## 2023-05-03 NOTE — HISTORY OF PRESENT ILLNESS
[FreeTextEntry1] : 82 y/o male, former smoker (quit 7/2022), with h/o chronic HFpEF ACC/AHA stage C, COPD on home O2 (4 LPM via NC) since July 2022, DM2, DVT on Eliquis who presents for hospital follow-up. \par \par Pt was recently admitted to [facility] from 4/5 - 4/17 for acute decompensated HFpEF/COPD exacerbation. \par Upon arrival to the ED he was found to have newly diagnosed Aflutter with variable block (VR 30-40s). \par His BP was stable and he denied symptoms associated to bradycardia. \par His labs were remarkable for elevated pBNP, normal trop, thrombocytopenia and elevated CO2. His CxR showed pulmonary congestion. He was given IV lasix, glucagon and calcium. His AV node blocker agents were held. \par Treated with continuous BiPAP, prednisone, bronchodilators and eventually transitioned to NC. His renal function improved and he ultimately underwent GRZEGORZ/DCCV (200J x 1) on 4/13 (EF 35-40%). Started on Mutaq per EP. \par \par The patient presents accompanied by his daughter who offers additional information. Since returning home from the hospital ~2 weeks ago the patient reports he has gained close to ten pounds. He does not weight himself everyday but has had a home care nurse check on him periodically. He has been trying to limit fluid intake to 1.5L daily but finds it difficult as he suffers from dry mouth. He endorses LE edema which improves with elevation. He is fairly sedentary and reports both fatigue and dyspnea on very minimal exertion. \par He denies CP, palpitations, near syncope/syncope.\par \par Labs 4/17/23: K 4.2, Cl 92, CO2 33, BUN 35.6, Cr 1.12, Mg 2.3.

## 2023-05-04 ENCOUNTER — NON-APPOINTMENT (OUTPATIENT)
Age: 83
End: 2023-05-04

## 2023-05-04 ENCOUNTER — OUTPATIENT (OUTPATIENT)
Dept: OUTPATIENT SERVICES | Facility: HOSPITAL | Age: 83
LOS: 1 days | Discharge: HOME | End: 2023-05-04

## 2023-05-04 DIAGNOSIS — Z90.49 ACQUIRED ABSENCE OF OTHER SPECIFIED PARTS OF DIGESTIVE TRACT: Chronic | ICD-10-CM

## 2023-05-04 DIAGNOSIS — D69.6 THROMBOCYTOPENIA, UNSPECIFIED: ICD-10-CM

## 2023-05-05 ENCOUNTER — NON-APPOINTMENT (OUTPATIENT)
Age: 83
End: 2023-05-05

## 2023-05-09 ENCOUNTER — APPOINTMENT (OUTPATIENT)
Dept: HEART FAILURE | Facility: CLINIC | Age: 83
End: 2023-05-09
Payer: MEDICARE

## 2023-05-09 VITALS
DIASTOLIC BLOOD PRESSURE: 78 MMHG | WEIGHT: 278 LBS | OXYGEN SATURATION: 92 % | HEART RATE: 61 BPM | SYSTOLIC BLOOD PRESSURE: 116 MMHG | HEIGHT: 72 IN | BODY MASS INDEX: 37.65 KG/M2

## 2023-05-09 DIAGNOSIS — I50.9 HEART FAILURE, UNSPECIFIED: ICD-10-CM

## 2023-05-09 PROCEDURE — 99214 OFFICE O/P EST MOD 30 MIN: CPT

## 2023-05-09 RX ORDER — SACUBITRIL AND VALSARTAN 24; 26 MG/1; MG/1
24-26 TABLET, FILM COATED ORAL TWICE DAILY
Qty: 30 | Refills: 0 | Status: DISCONTINUED | COMMUNITY
Start: 2023-04-27 | End: 2023-05-09

## 2023-05-09 RX ORDER — PREDNISONE 20 MG/1
20 TABLET ORAL DAILY
Refills: 0 | Status: DISCONTINUED | COMMUNITY
Start: 2023-04-27 | End: 2023-05-09

## 2023-05-09 NOTE — CARDIOLOGY SUMMARY
[de-identified] : \par 5/2/23: Aflutter HR 82 bpm\par  [de-identified] : \par GRZEGORZ 4/13/23: LVEF 35-40%, mod RVE, trace MR, mild TR (patient was in AFlutter with RVR at the time).\par \par TTE 4/5/23: LVEF 55-60%, LVIDd 5.47cm, severe RA and RV enlargement, trace MR, moderate TR, PASP 61.8 mmHg (RA 15 mmHg).

## 2023-05-09 NOTE — ASSESSMENT
[FreeTextEntry1] : # ACC/AHA Stage C chronic diastolic congestive heart failure:\par LVEF ~40% on GRZEGORZ 4/13 (although was in AFlutter with RVR at the time) previously 55-60%\par Multiple recent HF related hospitalizations. Recent exacerbated likely secondary to new onset aflutter\par GDMT: Continue to hold Entresto and will consider resuming if BP remains stable. Continue Spironolactone 25 mg daily. Will call patient's PCP to consult about starting on SGLT2i and follow up on recent urine studies to r/o UTI. Currently not on BB (will defer to Dr. Pang given h/o COPD).\par Diuretics: Decrease torsemide back to 20 mg daily and was recommended to take an additional 20 mg as needed for weight gain.\par Labs: Recent labs from last week showed K 4.0 and BUN/Cr 23/1.17. Will order repeat labs to assess renal function and electrolytes on higher dose of diuretics (patient requesting home draw this Friday, 5/12).\par Can repeat TTE once back in a regular rhythm and on GDMT\par Would likely benefit from CardioMEMs device to avoid recurrent hospitalizations however patient would need to first prove compliance.\par \par # Atrial flutter\par AVN blockers stopped in setting of bradycardia w/ variable block\par S/p GRZEGORZ/DCCV 4/13\par Initiated on Multaq 400mg BID per EP\par EKG 5/2 reveals Aflutter HR low 80s.\par Would likely benefit from ablation as outpt - pt to f/u with Dr. Pang, has an appointment tomorrow\par AC: Eliquis 5mg BID\par \par # COPD\par Maintained on 4LNC\par On Prednisone\par F/u with Dr. Martinez as outpt.

## 2023-05-09 NOTE — REASON FOR VISIT
[Cardiac Failure] : cardiac failure [Other: _____] : [unfilled] [FreeTextEntry1] : Primary Cardiology: [facility]\par HF Cardiologist: Dr. Carreon

## 2023-05-09 NOTE — PHYSICAL EXAM
[Well Developed] : well developed [No Acute Distress] : no acute distress [Obese] : obese [Normal S1, S2] : normal S1, S2 [No Murmur] : no murmur [Clear Lung Fields] : clear lung fields [No Respiratory Distress] : no respiratory distress  [Soft] : abdomen soft [Moves all extremities] : moves all extremities [Alert and Oriented] : alert and oriented [Normal] : normal venous pressure, no carotid bruit [de-identified] : ambulates with walker [de-identified] : trace edema bilaterally [de-identified] : warm

## 2023-05-09 NOTE — HISTORY OF PRESENT ILLNESS
[FreeTextEntry1] : 82 y/o male, former smoker (quit 7/2022), with h/o chronic HFpEF ACC/AHA stage C, COPD on home O2 (4 LPM via NC) since July 2022, DM2, DVT on Eliquis, and newly diagnosed Aflutter, s/p DCCV 4/13/23 who presents for hospital follow-up. \par \par Pt was recently admitted to [facility] from 4/5 - 4/17 for acute decompensated HFpEF/COPD exacerbation. \par Upon arrival to the ED he was found to have newly diagnosed Aflutter with variable block (VR 30-40s). \par His BP was stable and he denied symptoms associated to bradycardia. \par His labs were remarkable for elevated pBNP, normal trop, thrombocytopenia and elevated CO2. His CxR showed pulmonary congestion. He was given IV lasix, glucagon and calcium. His AV node blocker agents were held. \par Treated with continuous BiPAP, prednisone, bronchodilators and eventually transitioned to NC. His renal function improved and he ultimately underwent GRZEGORZ/DCCV (200J x 1) on 4/13 (EF 35-40%). Started on Multaq per EP. \par \par The patient presents accompanied by his daughter who offers additional information. Since returning home from the hospital ~2 weeks ago the patient reports he has gained close to ten pounds. He does not weight himself everyday but has had a home care nurse check on him periodically. He has been trying to limit fluid intake to 1.5L daily but finds it difficult as he suffers from dry mouth. He endorses LE edema which improves with elevation. He is fairly sedentary and reports both fatigue and dyspnea on very minimal exertion. \par He denies CP, palpitations, near syncope/syncope.\par \par Patient presents to office today with his daughter for follow up. Patient reports he feels well but does get short of breath with activity which is unchanged for him. He felt SOB today when walking to the office from his car.  He sleeps in recliner chair due to bed being uncomfortable for him. Weight down to 278 from 288 lbs last week. BP ranges 117//66, HR 68 today. Denies chest pain, palpitations, PND, lightheadedness, dizziness and change in appetite.

## 2023-05-09 NOTE — DISCUSSION/SUMMARY
[Patient] : the patient [___ Month(s)] : in [unfilled] month(s) [With ___] : with [unfilled] [FreeTextEntry2] : and daughter [FreeTextEntry1] : 84 y/o male, former smoker (quit 7/2022), with h/o chronic HFpEF ACC/AHA stage C, COPD on home O2 (4 LPM via NC) since July 2022, DM2, DVT on Eliquis, and newly diagnosed Aflutter, s/p DCCV 4/13/23 who presents for hospital follow-up. \par Currently appears euvolemic and normotensive. Will continue to optimize GDMT as above. Has an appointment to follow-up with Dr. Pang tomorrow and will discuss rhythm control strategy for his AFlutter, which is now rate controlled.\par Plan as above

## 2023-05-10 ENCOUNTER — NON-APPOINTMENT (OUTPATIENT)
Age: 83
End: 2023-05-10

## 2023-05-10 RX ORDER — GLIMEPIRIDE 1 MG/1
1 TABLET ORAL DAILY
Refills: 0 | Status: DISCONTINUED | COMMUNITY
Start: 2023-04-27 | End: 2023-05-10

## 2023-05-11 ENCOUNTER — RESULT REVIEW (OUTPATIENT)
Age: 83
End: 2023-05-11

## 2023-05-11 ENCOUNTER — NON-APPOINTMENT (OUTPATIENT)
Age: 83
End: 2023-05-11

## 2023-05-11 ENCOUNTER — APPOINTMENT (OUTPATIENT)
Dept: HEMATOLOGY ONCOLOGY | Facility: CLINIC | Age: 83
End: 2023-05-11
Payer: MEDICARE

## 2023-05-11 VITALS
HEART RATE: 117 BPM | OXYGEN SATURATION: 94 % | HEIGHT: 72 IN | DIASTOLIC BLOOD PRESSURE: 82 MMHG | WEIGHT: 275.01 LBS | BODY MASS INDEX: 37.25 KG/M2 | SYSTOLIC BLOOD PRESSURE: 120 MMHG

## 2023-05-11 DIAGNOSIS — D75.1 SECONDARY POLYCYTHEMIA: ICD-10-CM

## 2023-05-11 LAB
BASOPHILS # BLD AUTO: 0.1 K/UL — SIGNIFICANT CHANGE UP (ref 0–0.2)
BASOPHILS NFR BLD AUTO: 0.5 % — SIGNIFICANT CHANGE UP (ref 0–2)
EOSINOPHIL # BLD AUTO: 0.1 K/UL — SIGNIFICANT CHANGE UP (ref 0–0.5)
EOSINOPHIL NFR BLD AUTO: 0.7 % — SIGNIFICANT CHANGE UP (ref 0–6)
HCT VFR BLD CALC: 40.9 % — SIGNIFICANT CHANGE UP (ref 39–50)
HGB BLD-MCNC: 14.1 G/DL — SIGNIFICANT CHANGE UP (ref 13–17)
LYMPHOCYTES # BLD AUTO: 17.8 % — SIGNIFICANT CHANGE UP (ref 13–44)
LYMPHOCYTES # BLD AUTO: 2.2 K/UL — SIGNIFICANT CHANGE UP (ref 1–3.3)
MCHC RBC-ENTMCNC: 34.4 G/DL — SIGNIFICANT CHANGE UP (ref 32–36)
MCHC RBC-ENTMCNC: 34.6 PG — HIGH (ref 27–34)
MCV RBC AUTO: 100.8 FL — HIGH (ref 80–100)
MONOCYTES # BLD AUTO: 0.8 K/UL — SIGNIFICANT CHANGE UP (ref 0–0.9)
MONOCYTES NFR BLD AUTO: 6.1 % — SIGNIFICANT CHANGE UP (ref 2–14)
NEUTROPHILS # BLD AUTO: 9.3 K/UL — HIGH (ref 1.8–7.4)
NEUTROPHILS NFR BLD AUTO: 74.9 % — SIGNIFICANT CHANGE UP (ref 43–77)
PLATELET # BLD AUTO: 199 K/UL — SIGNIFICANT CHANGE UP (ref 150–400)
RBC # BLD: 4.06 M/UL — LOW (ref 4.2–5.8)
RBC # FLD: 12.4 % — SIGNIFICANT CHANGE UP (ref 10.3–14.5)
WBC # BLD: 12.4 K/UL — HIGH (ref 3.8–10.5)
WBC # FLD AUTO: 12.4 K/UL — HIGH (ref 3.8–10.5)

## 2023-05-11 PROCEDURE — 99205 OFFICE O/P NEW HI 60 MIN: CPT

## 2023-05-11 NOTE — HISTORY OF PRESENT ILLNESS
[de-identified] : 83y/oM PMH HTN, DM, COPD on home O2 3LNC, CHF, h/o DVT remotely, CHALO\par He was hospitalized at [facility] from 4/5/23 - 4/17/23 for CHF exacerbation, atrial flutter s/p cardioversion on 4/13/23. S/p diuresis. \par TTE with LVEF 55-60%, severe pulmonary HTN.\par Course also complicated by BRANDEN, improved.\par He is on eliquis 5 mg for atrial flutter, h/o DVT. \par \par He is referred for thrombocytopenia - His platelets during above hospitalization ranged from 70s -110s range.\par \par He previously was under the care of Dr. Lynne Costello at [facility] oncology. 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 He previously received phlebotomy x 2\par \par He is on oxygen 3L 24/7.\par He is has CPAP/BiPAP at home which he does not use.\par

## 2023-05-11 NOTE — ASSESSMENT
[FreeTextEntry1] : 83y/oM PMH HTN, DM, COPD on home O2 3LNC, CHF, h/o DVT remotely, CHALO, secondary polycythemia with intermittent phlebotomy\par He was hospitalized at [facility] from 4/5/23 - 4/17/23 for CHF exacerbation, atrial flutter s/p cardioversion on 4/13/23. He received diuresis. 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 \par -non compliant with CPAP

## 2023-05-11 NOTE — PHYSICAL EXAM
[Obese] : obese [Normal] : affect appropriate [de-identified] : elder male, frail appearing, on O2 [de-identified] : obese abdomen.

## 2023-05-12 ENCOUNTER — LABORATORY RESULT (OUTPATIENT)
Age: 83
End: 2023-05-12

## 2023-05-15 LAB
FOLATE SERPL-MCNC: 5.7 NG/ML
VIT B12 SERPL-MCNC: 561 PG/ML

## 2023-05-22 ENCOUNTER — LABORATORY RESULT (OUTPATIENT)
Age: 83
End: 2023-05-22

## 2023-06-09 ENCOUNTER — NON-APPOINTMENT (OUTPATIENT)
Age: 83
End: 2023-06-09

## 2023-06-12 ENCOUNTER — LABORATORY RESULT (OUTPATIENT)
Age: 83
End: 2023-06-12

## 2023-06-13 ENCOUNTER — APPOINTMENT (OUTPATIENT)
Dept: HEART FAILURE | Facility: CLINIC | Age: 83
End: 2023-06-13
Payer: MEDICARE

## 2023-06-13 VITALS
DIASTOLIC BLOOD PRESSURE: 76 MMHG | WEIGHT: 272.25 LBS | OXYGEN SATURATION: 93 % | BODY MASS INDEX: 36.87 KG/M2 | SYSTOLIC BLOOD PRESSURE: 116 MMHG | HEIGHT: 72 IN | HEART RATE: 78 BPM

## 2023-06-13 DIAGNOSIS — I50.33 ACUTE ON CHRONIC DIASTOLIC (CONGESTIVE) HEART FAILURE: ICD-10-CM

## 2023-06-13 PROCEDURE — 99214 OFFICE O/P EST MOD 30 MIN: CPT

## 2023-06-15 NOTE — ED PROVIDER NOTE - CADM POA PRESS ULCER
Certified letter has been mailed out to the patient  Unable to assess at this time due to patient’s acuity

## 2023-06-19 ENCOUNTER — LABORATORY RESULT (OUTPATIENT)
Age: 83
End: 2023-06-19

## 2023-06-20 ENCOUNTER — LABORATORY RESULT (OUTPATIENT)
Age: 83
End: 2023-06-20

## 2023-06-23 NOTE — ASSESSMENT
[FreeTextEntry1] : # ACC/AHA Stage C chronic diastolic congestive heart failure:\par LVEF ~40% on GRZEGORZ 4/13 (although was in AFlutter with RVR at the time) previously 55-60%\par Multiple recent HF related hospitalizations. Recent exacerbated likely secondary to new onset aflutter\par GDMT: Continue Spironolactone 25 mg daily and Farxiga 10 mg daily. Currently not on BB (will defer to Dr. Pang given h/o COPD).\par Diuretics: Decrease torsemide to 20 mg QOD and was recommended to take an additional 20 mg as needed for weight gain.\par Labs: Had labs drawn yesterday which showed Na 140, K 3.3, BUN/Cr up to 27/1.82 from 19/1.42. Daughter is not sure if her PCP ordered a potassium supplement for him (will check when they get home). Will order repeat labs on 6/19 via home draw.\par Can repeat TTE once back in a regular rhythm and on GDMT\par Would benefit from CardioMEMs device to avoid recurrent hospitalizations. He has been compliant with his medications and follow-up. Discussed with patient and his daughter who are both in agreement to scheduling the procedure.\par \par # Atrial flutter\par AVN blockers stopped in setting of bradycardia w/ variable block\par S/p GRZEGORZ/DCCV 4/13\par Initiated on Multaq 400mg BID per EP\par Recently seen by Dr. Pang and per daughter, was in a NSR (EKG from 5/2 showed Aflutter)\par AC: Eliquis 5mg BID which will need to be held prior to cardiac catheterization\par \par # COPD\par Maintained on 4LNC\par On Prednisone\par F/u with Dr. Martinez as outpt.

## 2023-06-23 NOTE — CARDIOLOGY SUMMARY
[de-identified] : \par 5/2/23: Aflutter HR 82 bpm\par  [de-identified] : \par GRZEGORZ 4/13/23: LVEF 35-40%, mod RVE, trace MR, mild TR (patient was in AFlutter with RVR at the time).\par \par TTE 4/5/23: LVEF 55-60%, LVIDd 5.47cm, severe RA and RV enlargement, trace MR, moderate TR, PASP 61.8 mmHg (RA 15 mmHg).

## 2023-06-23 NOTE — PHYSICAL EXAM
[Well Developed] : well developed [No Acute Distress] : no acute distress [Obese] : obese [Normal] : normal venous pressure, no carotid bruit [Normal S1, S2] : normal S1, S2 [No Murmur] : no murmur [Clear Lung Fields] : clear lung fields [No Respiratory Distress] : no respiratory distress  [Soft] : abdomen soft [Moves all extremities] : moves all extremities [Alert and Oriented] : alert and oriented [No Edema] : no edema [de-identified] : ambulates with walker [de-identified] : warm

## 2023-06-23 NOTE — HISTORY OF PRESENT ILLNESS
[FreeTextEntry1] : 82 y/o male, former smoker (quit 7/2022), with h/o chronic HFpEF ACC/AHA stage C, COPD on home O2 (4 LPM via NC) since July 2022, DM2, DVT on Eliquis, and newly diagnosed Aflutter, s/p DCCV 4/13/23 who presents for follow-up. \par \par Pt was recently admitted to [facility] from 4/5 - 4/17/23 for acute decompensated HFpEF/COPD exacerbation. \par Upon arrival to the ED he was found to have newly diagnosed Aflutter with variable block (VR 30-40s). \par His BP was stable and he denied symptoms associated to bradycardia. His labs were remarkable for elevated pBNP, normal trop, thrombocytopenia and elevated CO2. His CXR showed pulmonary congestion. He was given IV lasix, glucagon and calcium. His AV node blocker agents were held. Treated with continuous BiPAP, prednisone, bronchodilators and eventually transitioned to NC. His renal function improved and he ultimately underwent GRZEGORZ/DCCV (200J x 1) on 4/13 (EF 35-40%). Started on Multaq per EP. \par \par Patient presents to office today with his daughter for follow up. He reports feeling well. His weight has been stable between 273-275 lbs. SBP mostly 120-130s, at times 150s. He sleeps in recliner chair due to bed being uncomfortable for him. Weight down to 278 from 288 lbs last week. BP ranges 117//66, HR 68 today. Denies chest pain, palpitations, PND, lightheadedness, dizziness and change in appetite.

## 2023-06-23 NOTE — REASON FOR VISIT
[Cardiac Failure] : cardiac failure [Other: _____] : [unfilled] [FreeTextEntry1] : Primary Cardiology: Dr. Pang ([facility])\par HF Cardiologist: Dr. Carreon

## 2023-06-23 NOTE — DISCUSSION/SUMMARY
[FreeTextEntry1] : 82 y/o male, former smoker (quit 7/2022), with h/o chronic HFpEF ACC/AHA stage C, COPD on home O2 (4 LPM via NC) since July 2022, DM2, DVT on Eliquis, and newly diagnosed Aflutter, s/p DCCV 4/13/23 who presents for follow-up. \par Currently appears euvolemic and normotensive. Will continue to optimize GDMT as above. Will schedule for CardioMEMS implant to avoid recurrent HF hospitalizations.\par Plan as above

## 2023-06-23 NOTE — END OF VISIT
[FreeTextEntry3] :  I, Melania Carreon MD independently performed a history and physical examination of the patient, and formulated the management plan.\par I have reviewed the note by GABINO Smith and agree with the documented findings and plan of care.\par  [Time Spent: ___ minutes] : I have spent [unfilled] minutes of time on the encounter.

## 2023-08-08 ENCOUNTER — NON-APPOINTMENT (OUTPATIENT)
Age: 83
End: 2023-08-08

## 2023-08-29 ENCOUNTER — OUTPATIENT (OUTPATIENT)
Dept: OUTPATIENT SERVICES | Facility: HOSPITAL | Age: 83
LOS: 1 days | Discharge: HOME | End: 2023-08-29

## 2023-08-29 DIAGNOSIS — D69.6 THROMBOCYTOPENIA, UNSPECIFIED: ICD-10-CM

## 2023-08-29 DIAGNOSIS — Z90.49 ACQUIRED ABSENCE OF OTHER SPECIFIED PARTS OF DIGESTIVE TRACT: Chronic | ICD-10-CM

## 2023-09-08 ENCOUNTER — APPOINTMENT (OUTPATIENT)
Dept: HEART FAILURE | Facility: CLINIC | Age: 83
End: 2023-09-08
Payer: MEDICARE

## 2023-09-08 VITALS
SYSTOLIC BLOOD PRESSURE: 108 MMHG | WEIGHT: 270.13 LBS | DIASTOLIC BLOOD PRESSURE: 66 MMHG | BODY MASS INDEX: 36.59 KG/M2 | HEIGHT: 72 IN | HEART RATE: 98 BPM | OXYGEN SATURATION: 90 %

## 2023-09-08 DIAGNOSIS — J44.9 CHRONIC OBSTRUCTIVE PULMONARY DISEASE, UNSPECIFIED: ICD-10-CM

## 2023-09-08 DIAGNOSIS — I50.32 CHRONIC DIASTOLIC (CONGESTIVE) HEART FAILURE: ICD-10-CM

## 2023-09-08 DIAGNOSIS — E11.9 TYPE 2 DIABETES MELLITUS W/OUT COMPLICATIONS: ICD-10-CM

## 2023-09-08 PROCEDURE — 99214 OFFICE O/P EST MOD 30 MIN: CPT

## 2023-09-08 NOTE — END OF VISIT
[Time Spent: ___ minutes] : I have spent [unfilled] minutes of time on the encounter. [FreeTextEntry3] :  I, Melania Carreon MD independently performed a history and physical examination of the patient, and formulated the management plan.\par  I have reviewed the note by GABINO Smith and agree with the documented findings and plan of care.\par

## 2023-09-13 ENCOUNTER — RESULT REVIEW (OUTPATIENT)
Age: 83
End: 2023-09-13

## 2023-09-13 ENCOUNTER — APPOINTMENT (OUTPATIENT)
Dept: HEMATOLOGY ONCOLOGY | Facility: CLINIC | Age: 83
End: 2023-09-13
Payer: MEDICARE

## 2023-09-13 VITALS
BODY MASS INDEX: 35.73 KG/M2 | OXYGEN SATURATION: 95 % | HEIGHT: 72 IN | HEART RATE: 85 BPM | DIASTOLIC BLOOD PRESSURE: 83 MMHG | SYSTOLIC BLOOD PRESSURE: 148 MMHG | WEIGHT: 263.78 LBS

## 2023-09-13 LAB
ANISOCYTOSIS BLD QL: SLIGHT — SIGNIFICANT CHANGE UP
BASOPHILS # BLD AUTO: 0 K/UL — SIGNIFICANT CHANGE UP (ref 0–0.2)
BASOPHILS NFR BLD AUTO: 0.2 % — SIGNIFICANT CHANGE UP (ref 0–2)
EOSINOPHIL # BLD AUTO: 0.1 K/UL — SIGNIFICANT CHANGE UP (ref 0–0.5)
EOSINOPHIL NFR BLD AUTO: 1 % — SIGNIFICANT CHANGE UP (ref 0–6)
HCT VFR BLD CALC: 40.4 % — SIGNIFICANT CHANGE UP (ref 39–50)
HGB BLD-MCNC: 13.5 G/DL — SIGNIFICANT CHANGE UP (ref 13–17)
LG PLATELETS BLD QL AUTO: SLIGHT — SIGNIFICANT CHANGE UP
LYMPHOCYTES # BLD AUTO: 1.9 K/UL — SIGNIFICANT CHANGE UP (ref 1–3.3)
LYMPHOCYTES # BLD AUTO: 33 % — SIGNIFICANT CHANGE UP (ref 13–44)
MACROCYTES BLD QL: SIGNIFICANT CHANGE UP
MCHC RBC-ENTMCNC: 33.5 G/DL — SIGNIFICANT CHANGE UP (ref 32–36)
MCHC RBC-ENTMCNC: 34.2 PG — HIGH (ref 27–34)
MCV RBC AUTO: 102.2 FL — HIGH (ref 80–100)
MONOCYTES # BLD AUTO: 0.6 K/UL — SIGNIFICANT CHANGE UP (ref 0–0.9)
MONOCYTES NFR BLD AUTO: 6 % — SIGNIFICANT CHANGE UP (ref 2–14)
NEUTROPHILS # BLD AUTO: 4.6 K/UL — SIGNIFICANT CHANGE UP (ref 1.8–7.4)
NEUTROPHILS NFR BLD AUTO: 60 % — SIGNIFICANT CHANGE UP (ref 43–77)
PLAT MORPH BLD: NORMAL — SIGNIFICANT CHANGE UP
PLATELET # BLD AUTO: 113 K/UL — LOW (ref 150–400)
POIKILOCYTOSIS BLD QL AUTO: SLIGHT — SIGNIFICANT CHANGE UP
RBC # BLD: 3.95 M/UL — LOW (ref 4.2–5.8)
RBC # FLD: 12.9 % — SIGNIFICANT CHANGE UP (ref 10.3–14.5)
RBC BLD AUTO: SIGNIFICANT CHANGE UP
WBC # BLD: 7.2 K/UL — SIGNIFICANT CHANGE UP (ref 3.8–10.5)
WBC # FLD AUTO: 7.2 K/UL — SIGNIFICANT CHANGE UP (ref 3.8–10.5)

## 2023-09-13 PROCEDURE — 99214 OFFICE O/P EST MOD 30 MIN: CPT

## 2023-09-15 LAB
ALBUMIN SERPL ELPH-MCNC: 3.8 G/DL
ALP BLD-CCNC: 83 U/L
ALT SERPL-CCNC: 9 U/L
ANION GAP SERPL CALC-SCNC: 9 MMOL/L
AST SERPL-CCNC: 16 U/L
BILIRUB SERPL-MCNC: 0.4 MG/DL
BUN SERPL-MCNC: 25 MG/DL
CALCIUM SERPL-MCNC: 8.9 MG/DL
CHLORIDE SERPL-SCNC: 97 MMOL/L
CO2 SERPL-SCNC: 33 MMOL/L
CREAT SERPL-MCNC: 1.36 MG/DL
EGFR: 52 ML/MIN/1.73M2
FERRITIN SERPL-MCNC: 291 NG/ML
FOLATE SERPL-MCNC: >20 NG/ML
GLUCOSE SERPL-MCNC: 99 MG/DL
IRON SATN MFR SERPL: 32 %
IRON SERPL-MCNC: 90 UG/DL
POTASSIUM SERPL-SCNC: 4.5 MMOL/L
PROT SERPL-MCNC: 7.4 G/DL
SODIUM SERPL-SCNC: 139 MMOL/L
TIBC SERPL-MCNC: 279 UG/DL
UIBC SERPL-MCNC: 189 UG/DL
VIT B12 SERPL-MCNC: 415 PG/ML

## 2023-10-03 ENCOUNTER — RX RENEWAL (OUTPATIENT)
Age: 83
End: 2023-10-03

## 2023-11-17 RX ORDER — DAPAGLIFLOZIN 10 MG/1
10 TABLET, FILM COATED ORAL
Qty: 90 | Refills: 1 | Status: ACTIVE | COMMUNITY
Start: 2023-05-09 | End: 1900-01-01

## 2023-12-07 ENCOUNTER — OUTPATIENT (OUTPATIENT)
Dept: OUTPATIENT SERVICES | Facility: HOSPITAL | Age: 83
LOS: 1 days | Discharge: HOME | End: 2023-12-07

## 2023-12-07 DIAGNOSIS — D69.6 THROMBOCYTOPENIA, UNSPECIFIED: ICD-10-CM

## 2023-12-07 DIAGNOSIS — Z90.49 ACQUIRED ABSENCE OF OTHER SPECIFIED PARTS OF DIGESTIVE TRACT: Chronic | ICD-10-CM

## 2023-12-14 ENCOUNTER — APPOINTMENT (OUTPATIENT)
Dept: HEMATOLOGY ONCOLOGY | Facility: CLINIC | Age: 83
End: 2023-12-14
Payer: MEDICARE

## 2023-12-14 ENCOUNTER — RESULT REVIEW (OUTPATIENT)
Age: 83
End: 2023-12-14

## 2023-12-14 VITALS
WEIGHT: 294.31 LBS | DIASTOLIC BLOOD PRESSURE: 81 MMHG | OXYGEN SATURATION: 92 % | SYSTOLIC BLOOD PRESSURE: 144 MMHG | HEART RATE: 92 BPM | HEIGHT: 72 IN | BODY MASS INDEX: 39.86 KG/M2

## 2023-12-14 DIAGNOSIS — D69.6 THROMBOCYTOPENIA, UNSPECIFIED: ICD-10-CM

## 2023-12-14 LAB
BASOPHILS # BLD AUTO: 0.1 K/UL — SIGNIFICANT CHANGE UP (ref 0–0.2)
BASOPHILS # BLD AUTO: 0.1 K/UL — SIGNIFICANT CHANGE UP (ref 0–0.2)
BASOPHILS NFR BLD AUTO: 0.7 % — SIGNIFICANT CHANGE UP (ref 0–2)
BASOPHILS NFR BLD AUTO: 0.7 % — SIGNIFICANT CHANGE UP (ref 0–2)
EOSINOPHIL # BLD AUTO: 0.2 K/UL — SIGNIFICANT CHANGE UP (ref 0–0.5)
EOSINOPHIL # BLD AUTO: 0.2 K/UL — SIGNIFICANT CHANGE UP (ref 0–0.5)
EOSINOPHIL NFR BLD AUTO: 1.9 % — SIGNIFICANT CHANGE UP (ref 0–6)
EOSINOPHIL NFR BLD AUTO: 1.9 % — SIGNIFICANT CHANGE UP (ref 0–6)
HCT VFR BLD CALC: 38.6 % — LOW (ref 39–50)
HCT VFR BLD CALC: 38.6 % — LOW (ref 39–50)
HGB BLD-MCNC: 12.6 G/DL — LOW (ref 13–17)
HGB BLD-MCNC: 12.6 G/DL — LOW (ref 13–17)
LYMPHOCYTES # BLD AUTO: 2.3 K/UL — SIGNIFICANT CHANGE UP (ref 1–3.3)
LYMPHOCYTES # BLD AUTO: 2.3 K/UL — SIGNIFICANT CHANGE UP (ref 1–3.3)
LYMPHOCYTES # BLD AUTO: 26.9 % — SIGNIFICANT CHANGE UP (ref 13–44)
LYMPHOCYTES # BLD AUTO: 26.9 % — SIGNIFICANT CHANGE UP (ref 13–44)
MCHC RBC-ENTMCNC: 32.6 G/DL — SIGNIFICANT CHANGE UP (ref 32–36)
MCHC RBC-ENTMCNC: 32.6 G/DL — SIGNIFICANT CHANGE UP (ref 32–36)
MCHC RBC-ENTMCNC: 35.3 PG — HIGH (ref 27–34)
MCHC RBC-ENTMCNC: 35.3 PG — HIGH (ref 27–34)
MCV RBC AUTO: 108.2 FL — HIGH (ref 80–100)
MCV RBC AUTO: 108.2 FL — HIGH (ref 80–100)
MONOCYTES # BLD AUTO: 0.6 K/UL — SIGNIFICANT CHANGE UP (ref 0–0.9)
MONOCYTES # BLD AUTO: 0.6 K/UL — SIGNIFICANT CHANGE UP (ref 0–0.9)
MONOCYTES NFR BLD AUTO: 7.5 % — SIGNIFICANT CHANGE UP (ref 2–14)
MONOCYTES NFR BLD AUTO: 7.5 % — SIGNIFICANT CHANGE UP (ref 2–14)
NEUTROPHILS # BLD AUTO: 5.5 K/UL — SIGNIFICANT CHANGE UP (ref 1.8–7.4)
NEUTROPHILS # BLD AUTO: 5.5 K/UL — SIGNIFICANT CHANGE UP (ref 1.8–7.4)
NEUTROPHILS NFR BLD AUTO: 63.2 % — SIGNIFICANT CHANGE UP (ref 43–77)
NEUTROPHILS NFR BLD AUTO: 63.2 % — SIGNIFICANT CHANGE UP (ref 43–77)
PLATELET # BLD AUTO: 115 K/UL — LOW (ref 150–400)
PLATELET # BLD AUTO: 115 K/UL — LOW (ref 150–400)
RBC # BLD: 3.57 M/UL — LOW (ref 4.2–5.8)
RBC # BLD: 3.57 M/UL — LOW (ref 4.2–5.8)
RBC # FLD: 13 % — SIGNIFICANT CHANGE UP (ref 10.3–14.5)
RBC # FLD: 13 % — SIGNIFICANT CHANGE UP (ref 10.3–14.5)
WBC # BLD: 8.7 K/UL — SIGNIFICANT CHANGE UP (ref 3.8–10.5)
WBC # BLD: 8.7 K/UL — SIGNIFICANT CHANGE UP (ref 3.8–10.5)
WBC # FLD AUTO: 8.7 K/UL — SIGNIFICANT CHANGE UP (ref 3.8–10.5)
WBC # FLD AUTO: 8.7 K/UL — SIGNIFICANT CHANGE UP (ref 3.8–10.5)

## 2023-12-14 PROCEDURE — 99214 OFFICE O/P EST MOD 30 MIN: CPT

## 2023-12-14 NOTE — PHYSICAL EXAM
[Obese] : obese [Normal] : affect appropriate [de-identified] : elder male, frail appearing, on O2 [de-identified] : obese abdomen.

## 2023-12-14 NOTE — ASSESSMENT
[FreeTextEntry1] : 83y/oM PMH HTN, DM, COPD on home O2 3LNC, CHF, h/o DVT remotely, CHALO, secondary polycythemia with intermittent phlebotomy referred due to thrombocytopenia He was hospitalized at [facility] from 4/5/23 - 4/17/23 for CHF exacerbation, atrial flutter s/p cardioversion on 4/13/23. He received diuresis. TTE with LVEF 55-60%, severe pulmonary HTN. On eliquis for h/o DVT and atrial flutter  #thrombocytopenia - suspect secondary to hepatic congestion given CHF/severe pulm HTN -during above hospitalization platelets ranged from 70s -110s range. -no recent abdominal imaging done as was previously requested.  -platelets stable at 115K   RTO 6 months

## 2023-12-14 NOTE — ADDENDUM
[FreeTextEntry1] : Documented by Gloria Cantu acting as scribe for Dr. Cuevas on 12/14/2023.  All Medical record entries made by the Scribe were at my, Dr. Cuevas, direction and personally dictated by me on 12/14/2023. I have reviewed the chart and agree that the record accurately reflects my personal performance of the history, physical exam, assessment and plan. I have also personally directed, reviewed, and agreed with the discharge instructions.

## 2023-12-14 NOTE — HISTORY OF PRESENT ILLNESS
[de-identified] : 83y/oM PMH HTN, DM, COPD on home O2 3LNC, CHF, h/o DVT remotely, CHALO He was hospitalized at [facility] from 4/5/23 - 4/17/23 for CHF exacerbation, atrial flutter s/p cardioversion on 4/13/23. S/p diuresis.  TTE with LVEF 55-60%, severe pulmonary HTN. Course also complicated by BRANDEN, improved. He is on eliquis 5 mg for atrial flutter, h/o DVT.   He is referred for thrombocytopenia - His platelets during above hospitalization ranged from 70s -110s range.  He previously was under the care of Dr. Lynne Costello at [facility] oncology. 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 He previously received phlebotomy x 2  He is on oxygen 3L 24/7. He is has CPAP/BiPAP at home which he does not use.  [de-identified] : Returns for follow up visit. Daughter present for visit.  Continues on 3L nasal cannula O2 dependent, however goes periods of time w/o O2, follows up closely with pulmonologist  In office today, he c/o weight ~30 lbs since last visit attributes to increased po intake and decreased physical activity He is walking better since he does some exercise, ambulates with walker C/o increased urinary frequency, using depends C/o easily bruising  attributes to Eliquis C/o bilat LE edema, LT>RT  Denies black stool, blood in stool,  No longer on Farxiga, now taking ?multi Feels well

## 2024-01-04 ENCOUNTER — OFFICE (OUTPATIENT)
Dept: URBAN - METROPOLITAN AREA CLINIC 63 | Facility: CLINIC | Age: 84
Setting detail: OPHTHALMOLOGY
End: 2024-01-04
Payer: MEDICARE

## 2024-01-04 DIAGNOSIS — E11.9: ICD-10-CM

## 2024-01-04 DIAGNOSIS — H02.035: ICD-10-CM

## 2024-01-04 DIAGNOSIS — H26.491: ICD-10-CM

## 2024-01-04 DIAGNOSIS — Z96.1: ICD-10-CM

## 2024-01-04 DIAGNOSIS — H35.40: ICD-10-CM

## 2024-01-04 DIAGNOSIS — H40.1131: ICD-10-CM

## 2024-01-04 PROCEDURE — 92004 COMPRE OPH EXAM NEW PT 1/>: CPT | Performed by: STUDENT IN AN ORGANIZED HEALTH CARE EDUCATION/TRAINING PROGRAM

## 2024-01-04 ASSESSMENT — REFRACTION_AUTOREFRACTION
OS_AXIS: 077
OS_CYLINDER: -3.50
OS_SPHERE: +1.25
OD_AXIS: 165
OD_SPHERE: -0.25
OD_CYLINDER: -0.50

## 2024-01-04 ASSESSMENT — CONFRONTATIONAL VISUAL FIELD TEST (CVF)
OS_FINDINGS: FULL
OD_FINDINGS: FULL

## 2024-01-04 ASSESSMENT — SPHEQUIV_DERIVED
OS_SPHEQUIV: -0.5
OD_SPHEQUIV: -0.5

## 2024-01-04 ASSESSMENT — LID POSITION - ENTROPION: OS_ENTROPION: LLL T 1+

## 2024-01-17 ENCOUNTER — APPOINTMENT (OUTPATIENT)
Dept: HEART FAILURE | Facility: CLINIC | Age: 84
End: 2024-01-17

## 2024-01-23 ENCOUNTER — RESULT CHARGE (OUTPATIENT)
Age: 84
End: 2024-01-23

## 2024-01-24 ENCOUNTER — APPOINTMENT (OUTPATIENT)
Dept: HEART FAILURE | Facility: CLINIC | Age: 84
End: 2024-01-24
Payer: MEDICARE

## 2024-01-24 ENCOUNTER — NON-APPOINTMENT (OUTPATIENT)
Age: 84
End: 2024-01-24

## 2024-01-24 VITALS
DIASTOLIC BLOOD PRESSURE: 76 MMHG | SYSTOLIC BLOOD PRESSURE: 114 MMHG | HEART RATE: 86 BPM | OXYGEN SATURATION: 97 % | BODY MASS INDEX: 40.01 KG/M2 | WEIGHT: 295.38 LBS | HEIGHT: 72 IN

## 2024-01-24 PROCEDURE — 99214 OFFICE O/P EST MOD 30 MIN: CPT

## 2024-01-24 PROCEDURE — 93000 ELECTROCARDIOGRAM COMPLETE: CPT

## 2024-01-24 RX ORDER — TORSEMIDE 20 MG/1
20 TABLET ORAL AS DIRECTED
Qty: 30 | Refills: 0 | Status: DISCONTINUED | COMMUNITY
Start: 2023-04-27 | End: 2024-01-24

## 2024-01-24 NOTE — PHYSICAL EXAM
[Well Developed] : well developed [No Acute Distress] : no acute distress [Obese] : obese [Normal S1, S2] : normal S1, S2 [No Murmur] : no murmur [Clear Lung Fields] : clear lung fields [No Respiratory Distress] : no respiratory distress  [Soft] : abdomen soft [Moves all extremities] : moves all extremities [Alert and Oriented] : alert and oriented [de-identified] : JVP 10-12 cm H2O [de-identified] : ambulates with walker [de-identified] : 2+ BLE edema to mid calves [de-identified] : warm

## 2024-01-24 NOTE — DISCUSSION/SUMMARY
[EKG obtained to assist in diagnosis and management of assessed problem(s)] : EKG obtained to assist in diagnosis and management of assessed problem(s) [FreeTextEntry1] : 85 y/o male, former smoker (quit 7/2022), with h/o chronic HFpEF ACC/AHA stage C, COPD on home O2 (3 LPM via NC) since July 2022, DM2, DVT on Eliquis, and Aflutter, s/p DCCV 4/13/23 who presents for follow-up. Currently appears hypervolemic and normotensive. Will increase diuretics and continue GDMT as above. Continue shared care with Dr. Pang. Plan as above.

## 2024-01-24 NOTE — HISTORY OF PRESENT ILLNESS
[FreeTextEntry1] : 83 y/o male, former smoker (quit 7/2022), with h/o chronic HFpEF ACC/AHA stage C, COPD on home O2 (3 LPM via NC) since July 2022, DM2, DVT on Eliquis, and Aflutter, s/p DCCV 4/13/23 who presents for follow-up.   Pt was recently admitted to [facility] from 4/5 - 4/17/23 for acute decompensated HFpEF/COPD exacerbation.  Upon arrival to the ED he was found to have newly diagnosed Aflutter with variable block (VR 30-40s).  His BP was stable and he denied symptoms associated to bradycardia. His labs were remarkable for elevated pBNP, normal trop, thrombocytopenia and elevated CO2. His CXR showed pulmonary congestion. He was given IV lasix, glucagon and calcium. His AV node blocker agents were held. Treated with continuous BiPAP, prednisone, bronchodilators and eventually transitioned to NC. His renal function improved and he ultimately underwent GRZEGORZ/DCCV (200J x 1) on 4/13 (EF 35-40%). Started on Multaq per EP.   Since last seen in September, he has been doing generally well, but has had some weight gain (now 295 lbs which is up from 270 lbs in September). He does not check his weight at home. At his last visit, his torsemide was increased to 40 mg alternating with 20 mg QOD. About 3 weeks ago, his daughter was concerned if his "kidneys were getting dehydrated" so they decreased the dose to 20 mg daily. He has increased LE edema and reports drinking a lot of fluids. He has been following a low sodium diet, but yesterday had meatloaf and gravy for his birthday. He is generally inactive. He is SOB walking short distances with his walker. He denies CP, palpitations, LH/dizziness, orthopnea, or PND. No recent hospitalizations or visits to the ED.

## 2024-01-24 NOTE — REVIEW OF SYSTEMS
[FreeTextEntry1] : A complete review of systems was obtained and is negative except as stated in HPI (systems reviewed: Const, Eyes, ENT, Resp, CV, GI, , MSK, Skin, Neuro, Pysch, Endo, Hemato/Lymph and Allergy/Immuno)

## 2024-01-24 NOTE — ASSESSMENT
[FreeTextEntry1] : # ACC/AHA Stage C chronic diastolic congestive heart failure: LVEF ~40% on GRZEGORZ 4/13 (although was in AFlutter with RVR at the time) previously 55-60% Multiple HF related hospitalizations last year which may be secondary to new onset aflutter GDMT: Continue Spironolactone 25 mg daily and Farxiga 10 mg daily. Currently not on BB (will defer to Dr. Pang given h/o COPD). Diuretics: Increase torsemide back to 40 mg alternating with 20 mg daily every other day. Labs: Last CMP was 9/13/23: K 4.5, BUN/Cr 25/1.36, LFTs normal. Will repeat labs and schedule him for a home draw per patient request. Will call Dr. Pang's office to see if he has had a recent TTE now that he is back in a regular rhythm and on GDMT. If not, we will order. Would benefit from CardioMEMs device to avoid recurrent hospitalizations, but it wasn't covered by his insurance. He will start checking his weight daily and keep a log HF education provided including lifestyle changes (low Na diet, fluid restriction, increase physical activity), current clinical condition, natural progression and prognosis.   # Atrial flutter AVN blockers stopped in setting of bradycardia w/ variable block S/p GRZEGORZ/DCCV 4/13/23 Continue Multaq 400mg BID per EP AC: Eliquis 5mg BID  # COPD Maintained on 3LNC F/u with pulmonary, Dr. Martinez  RTO with the HF NP in 2 months and with Dr. Carreon 2-3 months after that.

## 2024-01-24 NOTE — REASON FOR VISIT
[Cardiac Failure] : cardiac failure [Other: _____] : [unfilled] [FreeTextEntry1] : Primary Cardiology: Dr. Pang ([facility]) HF Cardiologist: Dr. Carreon

## 2024-01-24 NOTE — CARDIOLOGY SUMMARY
[de-identified] : 1/24/24: SR, 86 bpm. 5/2/23: Aflutter HR 82 bpm [de-identified] : \par  GRZEGORZ 4/13/23: LVEF 35-40%, mod RVE, trace MR, mild TR (patient was in AFlutter with RVR at the time).\par  \par  TTE 4/5/23: LVEF 55-60%, LVIDd 5.47cm, severe RA and RV enlargement, trace MR, moderate TR, PASP 61.8 mmHg (RA 15 mmHg).

## 2024-02-25 NOTE — PROVIDER CONTACT NOTE (MEDICATION) - REASON
Pt discharged by ED provider, all questions answered. IV removed with cath intact. Pt ambulated out of ED dept with steady gait.   
Medication conformation

## 2024-03-08 PROBLEM — I50.32 CHRONIC DIASTOLIC HEART FAILURE WITH PRESERVED EJECTION FRACTION: Status: ACTIVE | Noted: 2023-06-13

## 2024-03-08 PROBLEM — E11.9 DIABETES MELLITUS: Status: ACTIVE | Noted: 2023-04-27

## 2024-03-08 PROBLEM — J44.9 COPD (CHRONIC OBSTRUCTIVE PULMONARY DISEASE): Status: ACTIVE | Noted: 2023-04-27

## 2024-03-08 NOTE — REASON FOR VISIT
[Cardiac Failure] : cardiac failure [Other: _____] : [unfilled] [FreeTextEntry1] : Primary Cardiology: Dr. Pang ([facility]) HF Cardiologist: Dr. Carreon

## 2024-03-08 NOTE — CARDIOLOGY SUMMARY
[de-identified] : \par  5/2/23: Aflutter HR 82 bpm\par   [de-identified] : \par  GRZEGORZ 4/13/23: LVEF 35-40%, mod RVE, trace MR, mild TR (patient was in AFlutter with RVR at the time).\par  \par  TTE 4/5/23: LVEF 55-60%, LVIDd 5.47cm, severe RA and RV enlargement, trace MR, moderate TR, PASP 61.8 mmHg (RA 15 mmHg).

## 2024-03-08 NOTE — DISCUSSION/SUMMARY
[FreeTextEntry1] : 84 y/o male, former smoker (quit 7/2022), with h/o chronic HFpEF ACC/AHA stage C, COPD on home O2 (4 LPM via NC) since July 2022, DM2, DVT on Eliquis, and newly diagnosed Aflutter, s/p DCCV 4/13/23 who presents for follow-up.  Currently appears euvolemic and normotensive. Will continue to optimize GDMT as above.  Plan as above

## 2024-03-08 NOTE — ASSESSMENT
[FreeTextEntry1] : # ACC/AHA Stage C chronic diastolic congestive heart failure: LVEF ~40% on GRZEGORZ 4/13 (although was in AFlutter with RVR at the time) previously 55-60% Multiple recent HF related hospitalizations. Recent exacerbated likely secondary to new onset aflutter GDMT: Continue Spironolactone 25 mg daily and Farxiga 10 mg daily. Currently not on BB (will defer to Dr. Pang given h/o COPD). Diuretics:  torsemide 20 mg QOD and was recommended to take an additional 20 mg as needed for weight gain. Can repeat TTE once back in a regular rhythm and on GDMT Would benefit from CardioMEMs device to avoid recurrent hospitalizations. He has been compliant with his medications and follow-up. Discussed with patient and his daughter who are both in agreement to scheduling the procedure.--> denied by insurance HF education provided including lifestyle changes (low Na diet, fluid restriction, increase physical activity), current clinical condition, natural progression and prognosis.  # Atrial flutter AVN blockers stopped in setting of bradycardia w/ variable block S/p GRZEGORZ/DCCV 4/13 Initiated on Multaq 400mg BID per EP Recently seen by Dr. Pang and per daughter, was in a NSR (EKG from 5/2 showed Aflutter) AC: Eliquis 5mg BID which will need to be held prior to cardiac catheterization  # COPD Maintained on 4LNC On Prednisone F/u with Dr. Martinez as outpt.

## 2024-03-08 NOTE — HISTORY OF PRESENT ILLNESS
[FreeTextEntry1] : 84 y/o male, former smoker (quit 7/2022), with h/o chronic HFpEF ACC/AHA stage C, COPD on home O2 (4 LPM via NC) since July 2022, DM2, DVT on Eliquis, and newly diagnosed Aflutter, s/p DCCV 4/13/23 who presents for follow-up.   Pt was recently admitted to [facility] from 4/5 - 4/17/23 for acute decompensated HFpEF/COPD exacerbation.  Upon arrival to the ED he was found to have newly diagnosed Aflutter with variable block (VR 30-40s).  His BP was stable and he denied symptoms associated to bradycardia. His labs were remarkable for elevated pBNP, normal trop, thrombocytopenia and elevated CO2. His CXR showed pulmonary congestion. He was given IV lasix, glucagon and calcium. His AV node blocker agents were held. Treated with continuous BiPAP, prednisone, bronchodilators and eventually transitioned to NC. His renal function improved and he ultimately underwent GRZEGORZ/DCCV (200J x 1) on 4/13 (EF 35-40%). Started on Multaq per EP.   Patient presents to office today with his daughter for follow up. He reports no overt heart failure symptoms, specifically denies orthopnea, PND, bendopnea, LE edema, chest discomfort, dizziness/lightheadedness, palpitations or syncope. No recent hospitalizations or visits to the ED.  We could not implant CardioEMs due to insurance, it was not approved.

## 2024-03-08 NOTE — PHYSICAL EXAM
[Well Developed] : well developed [No Acute Distress] : no acute distress [Obese] : obese [Normal] : normal venous pressure, no carotid bruit [Normal S1, S2] : normal S1, S2 [No Murmur] : no murmur [Clear Lung Fields] : clear lung fields [No Respiratory Distress] : no respiratory distress  [Soft] : abdomen soft [No Edema] : no edema [Moves all extremities] : moves all extremities [Alert and Oriented] : alert and oriented [de-identified] : warm [de-identified] : ambulates with walker

## 2024-04-03 ENCOUNTER — APPOINTMENT (OUTPATIENT)
Dept: HEART FAILURE | Facility: CLINIC | Age: 84
End: 2024-04-03
Payer: MEDICARE

## 2024-04-03 VITALS
BODY MASS INDEX: 39.01 KG/M2 | HEART RATE: 84 BPM | WEIGHT: 288 LBS | SYSTOLIC BLOOD PRESSURE: 118 MMHG | OXYGEN SATURATION: 100 % | DIASTOLIC BLOOD PRESSURE: 64 MMHG | HEIGHT: 72 IN

## 2024-04-03 DIAGNOSIS — I48.92 UNSPECIFIED ATRIAL FLUTTER: ICD-10-CM

## 2024-04-03 DIAGNOSIS — I10 ESSENTIAL (PRIMARY) HYPERTENSION: ICD-10-CM

## 2024-04-03 DIAGNOSIS — I50.22 CHRONIC SYSTOLIC (CONGESTIVE) HEART FAILURE: ICD-10-CM

## 2024-04-03 DIAGNOSIS — Z92.89 PERSONAL HISTORY OF OTHER MEDICAL TREATMENT: ICD-10-CM

## 2024-04-03 PROCEDURE — 99214 OFFICE O/P EST MOD 30 MIN: CPT

## 2024-04-03 NOTE — HISTORY OF PRESENT ILLNESS
[FreeTextEntry1] : 83 y/o male, former smoker (quit 7/2022), with h/o chronic HFpEF ACC/AHA stage C, COPD on home O2 (3 LPM via NC) since July 2022, DM2, DVT on Eliquis, and Aflutter, s/p DCCV 4/13/23 who presents for follow-up.   Pt was recently admitted to [facility] from 4/5 - 4/17/23 for acute decompensated HFpEF/COPD exacerbation.  Upon arrival to the ED he was found to have newly diagnosed Aflutter with variable block (VR 30-40s).  His BP was stable and he denied symptoms associated to bradycardia. His labs were remarkable for elevated pBNP, normal trop, thrombocytopenia and elevated CO2. His CXR showed pulmonary congestion. He was given IV lasix, glucagon and calcium. His AV node blocker agents were held. Treated with continuous BiPAP, prednisone, bronchodilators and eventually transitioned to NC. His renal function improved and he ultimately underwent GRZEGORZ/DCCV (200J x 1) on 4/13 (EF 35-40%). Started on Multaq per EP.   Since last seen in September, he has been doing generally well, but has had some weight gain (now 295 lbs which is up from 270 lbs in September). He does not check his weight at home. At his last visit, his torsemide was increased to 40 mg alternating with 20 mg QOD. About 3 weeks ago, his daughter was concerned if his "kidneys were getting dehydrated" so they decreased the dose to 20 mg daily. He has increased LE edema and reports drinking a lot of fluids. He has been following a low sodium diet, but yesterday had meatloaf and gravy for his birthday. He is generally inactive. He is SOB walking short distances with his walker. He denies CP, palpitations, LH/dizziness, orthopnea, or PND. No recent hospitalizations or visits to the ED.

## 2024-04-03 NOTE — REASON FOR VISIT
[Cardiac Failure] : cardiac failure [Other: _____] : [unfilled] [FreeTextEntry1] : Primary Cardiology: Dr. Pang ([facility]) HF Cardiologist: Dr. Carreon

## 2024-04-03 NOTE — DISCUSSION/SUMMARY
[FreeTextEntry1] : 83 y/o male, former smoker (quit 7/2022), with h/o chronic HFpEF ACC/AHA stage C, COPD on home O2 (3 LPM via NC) since July 2022, DM2, DVT on Eliquis, and Aflutter, s/p DCCV 4/13/23 who presents for follow-up. Currently appears close to euvolemia and normotensive. Will continue current medication regimen and repeat TTE to reassess LV function.  Continue shared care with Dr. Pang. Plan as above.

## 2024-04-03 NOTE — PHYSICAL EXAM
[Well Developed] : well developed [No Acute Distress] : no acute distress [Obese] : obese [Elevated JVD ____cm] : elevated JVD ~Vcm [Normal S1, S2] : normal S1, S2 [No Murmur] : no murmur [No Rub] : no rub [No Gallop] : no gallop [No Respiratory Distress] : no respiratory distress  [Soft] : abdomen soft [Moves all extremities] : moves all extremities [Alert and Oriented] : alert and oriented [de-identified] : warm, dry [de-identified] : Trace pretibial edema B/L [de-identified] : did not assess gait, ambulates w/ walker

## 2024-04-03 NOTE — ASSESSMENT
[FreeTextEntry1] : # ACC/AHA Stage C chronic diastolic congestive heart failure: LVEF ~40% on GRZEGORZ 4/13 (although was in AFlutter with RVR at the time) previously 55-60% Multiple HF related hospitalizations last year which may be secondary to new onset aflutter GDMT: Continue Spironolactone 25 mg daily and Farxiga 10 mg daily. Currently not on BB (will defer to Dr. Pang given h/o COPD). Diuretics: Continue Torsemide 40 mg alternating with 20 mg daily every other day. Labs: Last CMP from 1/31/24 showed stable renal function (BUN 31, Cr 1.3), proBNP 148. Will plan for repeat TTE to reevaluation LVEF post ablation.  Would benefit from CardioMEMs device to avoid recurrent hospitalizations, but it wasn't covered by his insurance. He will start checking his weight daily and keep a log. HF education provided including lifestyle changes (low Na diet, fluid restriction, increase physical activity), current clinical condition, natural progression and prognosis.   # Atrial flutter AVN blockers stopped in setting of bradycardia w/ variable block S/p GRZEGORZ/DCCV 4/13/23 Continue Multaq 400mg BID per EP AC: Eliquis 5mg BID  # COPD Maintained on 3LNC F/u with pulmonary, Dr. Martinez  RTO with Dr. Carreon in 3-4 months.

## 2024-04-03 NOTE — CARDIOLOGY SUMMARY
[de-identified] : \par  GRZEGORZ 4/13/23: LVEF 35-40%, mod RVE, trace MR, mild TR (patient was in AFlutter with RVR at the time).\par  \par  TTE 4/5/23: LVEF 55-60%, LVIDd 5.47cm, severe RA and RV enlargement, trace MR, moderate TR, PASP 61.8 mmHg (RA 15 mmHg). [de-identified] : 1/24/24: SR, 86 bpm. 5/2/23: Aflutter HR 82 bpm

## 2024-04-06 NOTE — PHYSICAL THERAPY INITIAL EVALUATION ADULT - REHAB POTENTIAL, PT EVAL
good, to achieve stated therapy goals
1) Continue diet as tolerated.   2) Consider addition of consistent carbohydrate diet as HgbA1c is elevated  3) Encourage po intake, monitor diet tolerance, and provide assistance at meals as needed.   4) Monitor BG levels, correct prn   5) Rx: MVI daily.   6) Obtain daily weights to monitor trends.

## 2024-05-08 ENCOUNTER — RX RENEWAL (OUTPATIENT)
Age: 84
End: 2024-05-08

## 2024-05-08 RX ORDER — SPIRONOLACTONE 25 MG/1
25 TABLET ORAL
Qty: 90 | Refills: 1 | Status: ACTIVE | COMMUNITY
Start: 2023-04-27 | End: 1900-01-01

## 2024-05-10 ENCOUNTER — RX RENEWAL (OUTPATIENT)
Age: 84
End: 2024-05-10

## 2024-05-10 RX ORDER — TORSEMIDE 20 MG/1
20 TABLET ORAL DAILY
Qty: 180 | Refills: 1 | Status: ACTIVE | COMMUNITY
Start: 2024-05-10 | End: 1900-01-01

## 2024-06-07 ENCOUNTER — APPOINTMENT (OUTPATIENT)
Dept: HEART FAILURE | Facility: CLINIC | Age: 84
End: 2024-06-07

## 2024-07-03 NOTE — PHYSICAL THERAPY INITIAL EVALUATION ADULT - MANUAL MUSCLE TESTING RESULTS, REHAB EVAL
[facility] - ORTHOPEDICS  [address] [place] [id number]  [phone]     NEW PATIENT VISIT - HISTORY AND PHYSICAL EXAMINATION     Name: Choco Olson   MRN: [id number]  Date: 7/3/2024     CC: Left shoulder pain.    REFERRED BY: Tegan Gaming MD    HPI:   Choco Olson is a very pleasant 55 year old right-hand dominant female who presents today for evaluation, consultation, and management of LEFT shoulder pain - after lifting one week ago. She reached and felt immediate pain, primarily at the muscle belly of her biceps. She complains of intermittent sharp. No tingling or numbness. She is a teacher.     PMH:   Past Medical History:    Essential hypertension    Wears glasses       PAST SURGICAL HX:  Past Surgical History:   Procedure Laterality Date             FAMILY HX:  Family History   Problem Relation Age of Onset    Hypertension Father     Diabetes Mother     Heart Attack Mother 65    Diabetes Maternal Grandmother     Hypertension Maternal Grandfather        ALLERGIES:  Patient has no known allergies.    MEDICATIONS:   Current Outpatient Medications   Medication Sig Dispense Refill    hydroCHLOROthiazide 25 MG Oral Tab Take 1 tablet (25 mg total) by mouth daily. 90 tablet 1    losartan 100 MG Oral Tab Take 1 tablet (100 mg total) by mouth daily. 90 tablet 1    metoprolol succinate ER 50 MG Oral Tablet 24 Hr Take 1 tablet (50 mg total) by mouth daily. 90 tablet 1    OMEGA-3 FATTY ACIDS OR Take by mouth.      MULTIPLE VITAMINS ESSENTIAL OR Take by mouth As Directed.         ROS: A comprehensive 14 point review of systems was performed and was negative aside from the aforementioned per history of present illness.    SOCIAL HX:  Social History     Occupational History    Occupation:    Tobacco Use    Smoking status: Never     Passive exposure: Never    Smokeless tobacco: Never   Vaping Use    Vaping status: Never Used   Substance and Sexual Activity     Alcohol use: Not Currently    Drug use: Never    Sexual activity: Not on file        PE:   Vitals:    07/03/24 0753   Weight: 115 lb (52.2 kg)   Height: 5' 2\" (1.575 m)     Estimated body mass index is 21.03 kg/m² as calculated from the following:    Height as of this encounter: 5' 2\" (1.575 m).    Weight as of this encounter: 115 lb (52.2 kg).    Physical Exam  Constitutional:       Appearance: Normal appearance.   HENT:      Head: Normocephalic and atraumatic.   Eyes:      Extraocular Movements: Extraocular movements intact.   Neck:      Musculoskeletal: Normal range of motion and neck supple.   Cardiovascular:      Pulses: Normal pulses.   Pulmonary:      Effort: Pulmonary effort is normal. No respiratory distress.   Abdominal:      General: There is no distension.   Skin:     General: Skin is warm.      Capillary Refill: Capillary refill takes less than 2 seconds.      Findings: No bruising.   Neurological:      General: No focal deficit present.      Mental Status: Alert.   Psychiatric:         Mood and Affect: Mood normal.     Examination of the left shoulder demonstrates:     Skin is intact, warm and dry.   Cervical:  Full ROM  Spurling's  Negative    Deformity:   none  Atrophy:   none    Scapular winging: Negative    Palpation:     AC Joint  Negative  Biceps Tendon  Negative  Greater Tuberosity Negative    ROM:   Forward Flexion:  full and symmetric  Abduction:   full and symmetric  External Rotation:  full and symmetric  Internal Rotation:  full and symmetric    Rotator Cuff Strength:   Supraspinatus:   5/5  Subscapularis:   5/5  Infraspinatus/Teres: 5/5    Provocative Tests:   Qui[place]ones:   Positive  Speed's:   Positive  [place]'s:   Negative  Lift-off:   Negative  Apprehension:  Negative  Sulcus Sign:   Negative    Neurovascular Upper Extremity (Bilateral)  Motor:    5/5 EPL, Finger Abduction, , Pinch, Deltoid  Sensation:   intact to light touch median, ulnar, radial and axillary nerve  Circulation:    Normal, 2+ radial pulse    The contralateral upper extremity is without limitation in range of motion or strength, no positive provocative maneuvers.     Radiographic Examination/Diagnostics:    I personally viewed, independently interpreted and radiology report was reviewed.      XR HUMERUS (MIN 2 VIEWS), RIGHT (CPT=73060)    Result Date: 7/3/2024  PROCEDURE:  XR HUMERUS (MIN 2 VIEWS), RIGHT(CPT=73060)  INDICATIONS:  M79.621 Pain of right upper arm  COMPARISON:  None.  PATIENT STATED HISTORY: (As transcribed by Technologist)  Patient complains of right mid humerus pain with certain movements. She denies any injury    FINDINGS:  No acute fractures or osseous lesions are identified.  Glenohumeral relationship appears within normal limits.  The visualized portion right lung is clear.            CONCLUSION:  No acute findings.   LOCATION:  [facility]   Dictated by (CST): Ml Laureano MD on 7/03/2024 at 8:06 AM     Finalized by (CST): Ml Laureano MD on 7/03/2024 at 8:06 AM         IMPRESSION: Choco Olson is a 55 year old Right hand dominant female left rotator cuff impingement.     PLAN:   We had a detailed discussion outlining the etiology, anatomy, pathophysiology, and natural history of the patient's findings. Imaging was reviewed in detail and correlated to a 3-dimensional model of the patient's pathology.     We reviewed the treatment of this disease condition.  Fortunately, treatment is amenable to conservative treatment which we chose to optimize at today's visit.  We recommended physical therapy to aid in strengthening, range of motion, functional improvement, and return to baseline activity.  The patient had the opportunity to ask questions and all questions were answered appropriately.      FOLLOW-UP:   Return to clinic in six weeks. No imaging required at next visit.           ELÍAS Allred, PA-C Orthopedic Surgery / Sports Medicine Specialist  [facility]  [id number]  [place] [address]   [email or web address] [email or web address]  [email or web address]  t: [phone]  o: [phone]  f: [phone]    This note was dictated using Dragon software.  While it was briefly proofread prior to completion, some grammatical, spelling, and word choice errors due to dictation may still occur.     b/l LE grossly 3/5

## 2024-07-19 NOTE — PROGRESS NOTE ADULT - SUBJECTIVE AND OBJECTIVE BOX
[Time Spent: ___ minutes] : I have spent [unfilled] minutes of time on the encounter. [facility] Division of Hospital Medicine    Chief Complaint:  resp failure     SUBJECTIVE / OVERNIGHT EVENTS:  Pt more awake this AM   Family at bedside   ABG with improvement     Patient denies chest pain, abd pain, N/V, fever, chills, dysuria or any other complaints. All remainder ROS negative.      MEDICATIONS  (STANDING):  albuterol/ipratropium for Nebulization 3 milliLiter(s) Nebulizer every 6 hours  dextrose 5%. 1000 milliLiter(s) (50 mL/Hr) IV Continuous <Continuous>  dextrose 5%. 1000 milliLiter(s) (100 mL/Hr) IV Continuous <Continuous>  dextrose 50% Injectable 25 Gram(s) IV Push once  dextrose 50% Injectable 12.5 Gram(s) IV Push once  dextrose 50% Injectable 25 Gram(s) IV Push once  furosemide   Injectable 80 milliGRAM(s) IV Push every 12 hours  glucagon  Injectable 1 milliGRAM(s) IntraMuscular once  heparin  Infusion. 2000 Unit(s)/Hr (20 mL/Hr) IV Continuous <Continuous>  insulin lispro (ADMELOG) corrective regimen sliding scale   SubCutaneous three times a day before meals  insulin lispro (ADMELOG) corrective regimen sliding scale   SubCutaneous at bedtime  methylPREDNISolone sodium succinate Injectable 40 milliGRAM(s) IV Push every 8 hours  sacubitril 24 mG/valsartan 26 mG 1 Tablet(s) Oral two times a day    MEDICATIONS  (PRN):  albuterol/ipratropium for Nebulization 3 milliLiter(s) Nebulizer every 3 hours PRN Shortness of Breath and/or Wheezing  dextrose Oral Gel 15 Gram(s) Oral once PRN Blood Glucose LESS THAN 70 milliGRAM(s)/deciliter  heparin   Injectable 46051 Unit(s) IV Push every 6 hours PRN For aPTT less than 40  heparin   Injectable 5000 Unit(s) IV Push every 6 hours PRN For aPTT between 40 - 57  morphine  - Injectable 0.5 milliGRAM(s) IV Push every 6 hours PRN increased work of breathing        I&O's Summary    2023 07:01  -  2023 07:00  --------------------------------------------------------  IN: 372 mL / OUT: 4500 mL / NET: -4128 mL    2023 07:01  -  2023 12:46  --------------------------------------------------------  IN: 916 mL / OUT: 1175 mL / NET: -259 mL        PHYSICAL EXAM:  Vital Signs Last 24 Hrs  T(C): 36.6 (2023 04:00), Max: 37.3 (2023 20:00)  T(F): 97.9 (2023 04:00), Max: 99.1 (2023 20:00)  HR: 95 (2023 12:00) (63 - 97)  BP: 130/65 (2023 12:00) (109/82 - 157/119)  BP(mean): 79 (2023 12:00) (78 - 131)  RR: 18 (2023 12:00) (14 - 30)  SpO2: 96% (2023 12:00) (94% - 100%)    Parameters below as of 2023 12:00  Patient On (Oxygen Delivery Method): nasal cannula  O2 Flow (L/min): 3        CONSTITUTIONAL: NAD, sitting up in bed  ENMT: Moist oral mucosa, blood noticed on the upper part of the pharynx, bloody mucus   RESPIRATORY: Fair respiratory effort; lungs with coarse BS, reduced at bases bilaterally  CARDIOVASCULAR: Regular rate and rhythm, normal S1 and S2, ++ lower extremity edema  ABDOMEN: Obese, Nontender to palpation, normoactive bowel sounds  MUSCULOSKELETAL:  No clubbing or cyanosis of digits   PSYCH: A+O to person, place not time; affect appropriate  NEUROLOGY: No gross sensory or motor deficits   SKIN: bleeding at IV site RUE, dressing in place       LABS:                        13.3   5.61  )-----------( 95       ( 2023 04:37 )             42.0     04-07    144  |  93<L>  |  29.1<H>  ----------------------------<  166<H>  4.0   |  39.0<H>  |  1.20    Ca    9.1      2023 04:37    TPro  7.3  /  Alb  3.4  /  TBili  0.5  /  DBili  x   /  AST  20  /  ALT  17  /  AlkPhos  119  04-06    PT/INR - ( 2023 11:30 )   PT: 18.6 sec;   INR: 1.60 ratio         PTT - ( 2023 04:50 )  PTT:>200.0 sec      Urinalysis Basic - ( 2023 02:00 )    Color: Yellow / Appearance: Clear / S.020 / pH: x  Gluc: x / Ketone: Negative  / Bili: Negative / Urobili: Negative mg/dL   Blood: x / Protein: Negative / Nitrite: Negative   Leuk Esterase: Trace / RBC: 6-10 /HPF / WBC 0-2 /HPF   Sq Epi: x / Non Sq Epi: x / Bacteria: x        CAPILLARY BLOOD GLUCOSE      POCT Blood Glucose.: 189 mg/dL (2023 12:35)  POCT Blood Glucose.: 157 mg/dL (2023 21:55)  POCT Blood Glucose.: 116 mg/dL (2023 17:56)  POCT Blood Glucose.: 105 mg/dL (2023 13:05)

## 2024-07-26 ENCOUNTER — NON-APPOINTMENT (OUTPATIENT)
Age: 84
End: 2024-07-26

## 2024-07-26 ENCOUNTER — APPOINTMENT (OUTPATIENT)
Dept: HEART FAILURE | Facility: CLINIC | Age: 84
End: 2024-07-26
Payer: MEDICARE

## 2024-07-26 VITALS
BODY MASS INDEX: 40.09 KG/M2 | HEART RATE: 98 BPM | HEIGHT: 72 IN | WEIGHT: 296 LBS | OXYGEN SATURATION: 97 % | SYSTOLIC BLOOD PRESSURE: 118 MMHG | DIASTOLIC BLOOD PRESSURE: 76 MMHG

## 2024-07-26 DIAGNOSIS — E11.9 TYPE 2 DIABETES MELLITUS W/OUT COMPLICATIONS: ICD-10-CM

## 2024-07-26 DIAGNOSIS — J44.9 CHRONIC OBSTRUCTIVE PULMONARY DISEASE, UNSPECIFIED: ICD-10-CM

## 2024-07-26 DIAGNOSIS — I10 ESSENTIAL (PRIMARY) HYPERTENSION: ICD-10-CM

## 2024-07-26 DIAGNOSIS — I50.22 CHRONIC SYSTOLIC (CONGESTIVE) HEART FAILURE: ICD-10-CM

## 2024-07-26 PROCEDURE — 99214 OFFICE O/P EST MOD 30 MIN: CPT

## 2024-07-26 PROCEDURE — 93000 ELECTROCARDIOGRAM COMPLETE: CPT

## 2024-07-28 NOTE — HISTORY OF PRESENT ILLNESS
[FreeTextEntry1] : 85 y/o male, former smoker (quit 7/2022), with h/o chronic HFpEF ACC/AHA stage C, COPD on home O2 (3 LPM via NC) since July 2022, DM2, DVT on Eliquis, and Aflutter, s/p DCCV 4/13/23 who presents for follow-up.   Pt was admitted to [facility] from 4/5 - 4/17/23 for acute decompensated HFpEF/COPD exacerbation.  Upon arrival to the ED he was found to have newly diagnosed Aflutter with variable block (VR 30-40s).  His BP was stable and he denied symptoms associated to bradycardia. His labs were remarkable for elevated pBNP, normal trop, thrombocytopenia and elevated CO2. His CXR showed pulmonary congestion. He was given IV lasix, glucagon and calcium. His AV node blocker agents were held. Treated with continuous BiPAP, prednisone, bronchodilators and eventually transitioned to NC. His renal function improved and he ultimately underwent GRZEGORZ/DCCV (200J x 1) on 4/13 (EF 35-40%). Started on Multaq per EP.   We could not implant CardioEMs due to insurance, it was not approved.  The patient returns today for a follow up. He reports that his breathing is okay, he uses 3L of oxygen 24hr/day. He does not exercise and is mostly sedentary. He denies overt HF symptoms specifically CP, palpitations, SOB, dizziness/LH, LE, orthopnea and PND. No recent hospitalizations or ED visits.

## 2024-07-28 NOTE — HISTORY OF PRESENT ILLNESS
[FreeTextEntry1] : 83 y/o male, former smoker (quit 7/2022), with h/o chronic HFpEF ACC/AHA stage C, COPD on home O2 (3 LPM via NC) since July 2022, DM2, DVT on Eliquis, and Aflutter, s/p DCCV 4/13/23 who presents for follow-up.   Pt was admitted to [facility] from 4/5 - 4/17/23 for acute decompensated HFpEF/COPD exacerbation.  Upon arrival to the ED he was found to have newly diagnosed Aflutter with variable block (VR 30-40s).  His BP was stable and he denied symptoms associated to bradycardia. His labs were remarkable for elevated pBNP, normal trop, thrombocytopenia and elevated CO2. His CXR showed pulmonary congestion. He was given IV lasix, glucagon and calcium. His AV node blocker agents were held. Treated with continuous BiPAP, prednisone, bronchodilators and eventually transitioned to NC. His renal function improved and he ultimately underwent GRZEGORZ/DCCV (200J x 1) on 4/13 (EF 35-40%). Started on Multaq per EP.   We could not implant CardioEMs due to insurance, it was not approved.  The patient returns today for a follow up. He reports that his breathing is okay, he uses 3L of oxygen 24hr/day. He does not exercise and is mostly sedentary. He denies overt HF symptoms specifically CP, palpitations, SOB, dizziness/LH, LE, orthopnea and PND. No recent hospitalizations or ED visits.

## 2024-07-28 NOTE — REASON FOR VISIT
[Cardiac Failure] : cardiac failure [Other: _____] : [unfilled] [FreeTextEntry1] : Primary Cardiology: Dr. Pang ([facility]) HF Cardiologist: Dr. Carreon

## 2024-07-28 NOTE — DISCUSSION/SUMMARY
[FreeTextEntry1] : 85 y/o male, former smoker (quit 7/2022), with h/o chronic HFpEF ACC/AHA stage C, COPD on home O2 (3 LPM via NC) since July 2022, DM2, DVT on Eliquis, and Aflutter, s/p DCCV 4/13/23 who presents for follow-up. Currently appears close to euvolemia and normotensive. Will continue current medication regimen and request repeat TTE from Dr. Pang's office.  Continue shared care with Dr. Pang. Plan as above.  [EKG obtained to assist in diagnosis and management of assessed problem(s)] : EKG obtained to assist in diagnosis and management of assessed problem(s)

## 2024-07-28 NOTE — PHYSICAL EXAM
[Well Developed] : well developed [No Acute Distress] : no acute distress [Soft] : abdomen soft [Non Tender] : non-tender [Moves all extremities] : moves all extremities [Alert and Oriented] : alert and oriented [de-identified] : *+2 left, +1 right [Normal Venous Pressure] : normal venous pressure [Normal S1, S2] : normal S1, S2 [No Murmur] : no murmur [No Rub] : no rub [Clear Lung Fields] : clear lung fields [No Edema] : no edema [de-identified] : Pt on wheelchair connected to O2 via NC.  [de-identified] : did not assess gait

## 2024-07-28 NOTE — DISCUSSION/SUMMARY
[FreeTextEntry1] : 83 y/o male, former smoker (quit 7/2022), with h/o chronic HFpEF ACC/AHA stage C, COPD on home O2 (3 LPM via NC) since July 2022, DM2, DVT on Eliquis, and Aflutter, s/p DCCV 4/13/23 who presents for follow-up. Currently appears close to euvolemia and normotensive. Will continue current medication regimen and request repeat TTE from Dr. Pang's office.  Continue shared care with Dr. Pang. Plan as above.  [EKG obtained to assist in diagnosis and management of assessed problem(s)] : EKG obtained to assist in diagnosis and management of assessed problem(s)

## 2024-07-28 NOTE — REVIEW OF SYSTEMS
[FreeTextEntry1] :  A complete review of systems was obtained and is negative except as stated in HPI (systems reviewed: Const, Eyes, ENT, Resp, CV, GI, , MSK, Skin, Neuro, Pysch, Endo, Hemato/Lymph and Allergy/Immuno).

## 2024-07-28 NOTE — CARDIOLOGY SUMMARY
[de-identified] : 1/24/24: SR, 86 bpm. 5/2/23: Aflutter HR 82 bpm [de-identified] : GRZEGORZ 4/13/23: LVEF 35-40%, mod RVE, trace MR, mild TR (patient was in AFlutter with RVR at the time).  TTE 4/5/23: LVEF 55-60%, LVIDd 5.47cm, severe RA and RV enlargement, trace MR, moderate TR, PASP 61.8 mmHg (RA 15 mmHg).

## 2024-07-28 NOTE — PHYSICAL EXAM
[Well Developed] : well developed [No Acute Distress] : no acute distress [Soft] : abdomen soft [Non Tender] : non-tender [Moves all extremities] : moves all extremities [Alert and Oriented] : alert and oriented [de-identified] : *+2 left, +1 right [Normal Venous Pressure] : normal venous pressure [Normal S1, S2] : normal S1, S2 [No Murmur] : no murmur [No Rub] : no rub [Clear Lung Fields] : clear lung fields [No Edema] : no edema [de-identified] : Pt on wheelchair connected to O2 via NC.  [de-identified] : did not assess gait

## 2024-07-28 NOTE — END OF VISIT
[FreeTextEntry2] :  I, Chuyita Mendez, am scribing for and in the presence of  in the following sections HISTORY OF PRESENT ILLNESSS, PAST MEDICAL/FAMILY/SOCIAL HISTORY; REVIEW OF SYSTEMS; VITAL SIGNS; PHYSICAL EXAM; ASSESSMENT/PLAN   [Time Spent: ___ minutes] : I have spent [unfilled] minutes of time on the encounter. [FreeTextEntry3] :  I, Chuyita Mendez, am scribing for and in the presence of  in the following sections HISTORY OF PRESENT ILLNESSS, PAST MEDICAL/FAMILY/SOCIAL HISTORY; REVIEW OF SYSTEMS; VITAL SIGNS; PHYSICAL EXAM; ASSESSMENT/PLAN  IMelania MD, personally performed the services described in the documentation, reviewed the documentation recorded by the scribe in my presence, and it accurately and completely records my words and actions.IMelania MD, personally performed the services described in the documentation, reviewed the documentation recorded by the scribe in my presence, and it accurately and completely records my words and actions.

## 2024-07-28 NOTE — REASON FOR VISIT
[Cardiac Failure] : cardiac failure [Other: _____] : [unfilled] [FreeTextEntry1] : Primary Cardiology: Dr. Pang ([facility]) HF Cardiologist: Dr. Carreon

## 2024-07-28 NOTE — HISTORY OF PRESENT ILLNESS
[FreeTextEntry1] : 85 y/o male, former smoker (quit 7/2022), with h/o chronic HFpEF ACC/AHA stage C, COPD on home O2 (3 LPM via NC) since July 2022, DM2, DVT on Eliquis, and Aflutter, s/p DCCV 4/13/23 who presents for follow-up.   Pt was admitted to [facility] from 4/5 - 4/17/23 for acute decompensated HFpEF/COPD exacerbation.  Upon arrival to the ED he was found to have newly diagnosed Aflutter with variable block (VR 30-40s).  His BP was stable and he denied symptoms associated to bradycardia. His labs were remarkable for elevated pBNP, normal trop, thrombocytopenia and elevated CO2. His CXR showed pulmonary congestion. He was given IV lasix, glucagon and calcium. His AV node blocker agents were held. Treated with continuous BiPAP, prednisone, bronchodilators and eventually transitioned to NC. His renal function improved and he ultimately underwent GRZEGORZ/DCCV (200J x 1) on 4/13 (EF 35-40%). Started on Multaq per EP.   We could not implant CardioEMs due to insurance, it was not approved.  The patient returns today for a follow up. He reports that his breathing is okay, he uses 3L of oxygen 24hr/day. He does not exercise and is mostly sedentary. He denies overt HF symptoms specifically CP, palpitations, SOB, dizziness/LH, LE, orthopnea and PND. No recent hospitalizations or ED visits.

## 2024-07-28 NOTE — ASSESSMENT
[FreeTextEntry1] : # ACC/AHA Stage C chronic diastolic congestive heart failure, NYHA III LVEF ~40% on GRZEGORZ 4/13 (although was in AFlutter with RVR at the time) previously 55-60% Multiple HF related hospitalizations last year which may be secondary to new onset aflutter GDMT: *Pt is not sure which medications he is taking, pt will call the office back later. Continue Spironolactone 25 mg daily and Farxiga 10 mg daily Diuretics: Continue Torsemide 40 mg alternating with 20 mg daily every other day. He states he underwent TTE at Dr. Pang's office. Will request records.  Would benefit from CardioMEMs device to avoid recurrent hospitalizations, but it wasn't covered by his insurance. HF education provided including lifestyle changes (low Na diet, fluid restriction, increase physical activity), current clinical condition, natural progression and prognosis.   # Atrial flutter AVN blockers stopped in setting of bradycardia w/ variable block S/p GRZEGORZ/DCCV 4/13/23 Continue Multaq 400mg BID per EP AC: Eliquis 5mg BID  # COPD Maintained on 3LNC F/u with pulmonary, Dr. Martinez

## 2024-07-28 NOTE — REASON FOR VISIT
[Cardiac Failure] : cardiac failure [Other: _____] : [unfilled] [FreeTextEntry1] : Primary Cardiology: Dr. Pang ([facility]) HF Cardiologist: Dr. Carreon

## 2024-07-28 NOTE — PHYSICAL EXAM
[Well Developed] : well developed [No Acute Distress] : no acute distress [Soft] : abdomen soft [Non Tender] : non-tender [Moves all extremities] : moves all extremities [Alert and Oriented] : alert and oriented [de-identified] : *+2 left, +1 right [Normal Venous Pressure] : normal venous pressure [Normal S1, S2] : normal S1, S2 [No Murmur] : no murmur [No Rub] : no rub [Clear Lung Fields] : clear lung fields [No Edema] : no edema [de-identified] : Pt on wheelchair connected to O2 via NC.  [de-identified] : did not assess gait

## 2024-07-28 NOTE — CARDIOLOGY SUMMARY
[de-identified] : 1/24/24: SR, 86 bpm. 5/2/23: Aflutter HR 82 bpm [de-identified] : GRZEGORZ 4/13/23: LVEF 35-40%, mod RVE, trace MR, mild TR (patient was in AFlutter with RVR at the time).  TTE 4/5/23: LVEF 55-60%, LVIDd 5.47cm, severe RA and RV enlargement, trace MR, moderate TR, PASP 61.8 mmHg (RA 15 mmHg).

## 2024-07-28 NOTE — CARDIOLOGY SUMMARY
[de-identified] : 1/24/24: SR, 86 bpm. 5/2/23: Aflutter HR 82 bpm [de-identified] : GRZEGORZ 4/13/23: LVEF 35-40%, mod RVE, trace MR, mild TR (patient was in AFlutter with RVR at the time).  TTE 4/5/23: LVEF 55-60%, LVIDd 5.47cm, severe RA and RV enlargement, trace MR, moderate TR, PASP 61.8 mmHg (RA 15 mmHg).

## 2025-02-01 ENCOUNTER — RX RENEWAL (OUTPATIENT)
Age: 85
End: 2025-02-01

## 2025-02-07 ENCOUNTER — APPOINTMENT (OUTPATIENT)
Dept: HEART FAILURE | Facility: CLINIC | Age: 85
End: 2025-02-07
Payer: MEDICARE

## 2025-02-07 ENCOUNTER — NON-APPOINTMENT (OUTPATIENT)
Age: 85
End: 2025-02-07

## 2025-02-07 VITALS
HEART RATE: 92 BPM | WEIGHT: 308 LBS | SYSTOLIC BLOOD PRESSURE: 124 MMHG | DIASTOLIC BLOOD PRESSURE: 72 MMHG | BODY MASS INDEX: 41.72 KG/M2 | HEIGHT: 72 IN | OXYGEN SATURATION: 96 %

## 2025-02-07 DIAGNOSIS — E11.9 TYPE 2 DIABETES MELLITUS W/OUT COMPLICATIONS: ICD-10-CM

## 2025-02-07 DIAGNOSIS — J44.9 CHRONIC OBSTRUCTIVE PULMONARY DISEASE, UNSPECIFIED: ICD-10-CM

## 2025-02-07 DIAGNOSIS — E66.2 MORBID (SEVERE) OBESITY WITH ALVEOLAR HYPOVENTILATION: ICD-10-CM

## 2025-02-07 DIAGNOSIS — I50.22 CHRONIC SYSTOLIC (CONGESTIVE) HEART FAILURE: ICD-10-CM

## 2025-02-07 DIAGNOSIS — I10 ESSENTIAL (PRIMARY) HYPERTENSION: ICD-10-CM

## 2025-02-07 PROCEDURE — 99214 OFFICE O/P EST MOD 30 MIN: CPT

## 2025-02-07 RX ORDER — POTASSIUM CHLORIDE 600 MG/1
8 TABLET, FILM COATED, EXTENDED RELEASE ORAL DAILY
Refills: 0 | Status: ACTIVE | COMMUNITY

## 2025-02-07 RX ORDER — COLD-HOT PACK
125 MCG EACH MISCELLANEOUS DAILY
Refills: 0 | Status: ACTIVE | COMMUNITY

## 2025-02-07 RX ORDER — FOLIC ACID 1 MG/1
1 TABLET ORAL DAILY
Refills: 0 | Status: ACTIVE | COMMUNITY

## 2025-02-17 ENCOUNTER — LABORATORY RESULT (OUTPATIENT)
Age: 85
End: 2025-02-17

## 2025-02-18 ENCOUNTER — LABORATORY RESULT (OUTPATIENT)
Age: 85
End: 2025-02-18

## 2025-02-21 ENCOUNTER — NON-APPOINTMENT (OUTPATIENT)
Age: 85
End: 2025-02-21

## 2025-03-07 ENCOUNTER — APPOINTMENT (OUTPATIENT)
Dept: HEART FAILURE | Facility: CLINIC | Age: 85
End: 2025-03-07
Payer: MEDICARE

## 2025-03-07 VITALS
SYSTOLIC BLOOD PRESSURE: 128 MMHG | DIASTOLIC BLOOD PRESSURE: 86 MMHG | WEIGHT: 295 LBS | HEIGHT: 72 IN | HEART RATE: 96 BPM | OXYGEN SATURATION: 98 % | BODY MASS INDEX: 39.96 KG/M2

## 2025-03-07 DIAGNOSIS — I50.32 CHRONIC DIASTOLIC (CONGESTIVE) HEART FAILURE: ICD-10-CM

## 2025-03-07 DIAGNOSIS — I48.92 UNSPECIFIED ATRIAL FLUTTER: ICD-10-CM

## 2025-03-07 PROCEDURE — 99214 OFFICE O/P EST MOD 30 MIN: CPT

## 2025-04-30 ENCOUNTER — APPOINTMENT (OUTPATIENT)
Dept: CARDIOLOGY | Facility: CLINIC | Age: 85
End: 2025-04-30

## 2025-05-27 ENCOUNTER — APPOINTMENT (OUTPATIENT)
Dept: CARDIOLOGY | Facility: CLINIC | Age: 85
End: 2025-05-27

## 2025-06-11 ENCOUNTER — APPOINTMENT (OUTPATIENT)
Dept: HEART FAILURE | Facility: CLINIC | Age: 85
End: 2025-06-11

## 2025-06-13 ENCOUNTER — EMERGENCY (EMERGENCY)
Facility: HOSPITAL | Age: 85
LOS: 1 days | End: 2025-06-13
Attending: STUDENT IN AN ORGANIZED HEALTH CARE EDUCATION/TRAINING PROGRAM
Payer: MEDICARE

## 2025-06-13 VITALS
RESPIRATION RATE: 18 BRPM | TEMPERATURE: 98 F | HEART RATE: 78 BPM | OXYGEN SATURATION: 96 % | SYSTOLIC BLOOD PRESSURE: 122 MMHG | DIASTOLIC BLOOD PRESSURE: 71 MMHG

## 2025-06-13 VITALS
HEART RATE: 78 BPM | HEIGHT: 72 IN | TEMPERATURE: 98 F | SYSTOLIC BLOOD PRESSURE: 139 MMHG | OXYGEN SATURATION: 89 % | RESPIRATION RATE: 18 BRPM | DIASTOLIC BLOOD PRESSURE: 75 MMHG | WEIGHT: 300.05 LBS

## 2025-06-13 DIAGNOSIS — Z90.49 ACQUIRED ABSENCE OF OTHER SPECIFIED PARTS OF DIGESTIVE TRACT: Chronic | ICD-10-CM

## 2025-06-13 PROCEDURE — 99284 EMERGENCY DEPT VISIT MOD MDM: CPT

## 2025-06-13 PROCEDURE — 99053 MED SERV 10PM-8AM 24 HR FAC: CPT

## 2025-06-13 RX ORDER — TRANEXAMIC ACID 1000 MG/10
1 AMPUL (ML) INTRAVENOUS ONCE
Refills: 0 | Status: DISCONTINUED | OUTPATIENT
Start: 2025-06-13 | End: 2025-06-13

## 2025-06-13 RX ORDER — TRANEXAMIC ACID 1000 MG/10
5 AMPUL (ML) INTRAVENOUS ONCE
Refills: 0 | Status: COMPLETED | OUTPATIENT
Start: 2025-06-13 | End: 2025-06-13

## 2025-06-13 RX ADMIN — Medication 5 MILLILITER(S): at 09:45

## 2025-06-13 NOTE — ED ADULT NURSE NOTE - NSFALLHARMRISKINTERV_ED_ALL_ED

## 2025-06-13 NOTE — ED PROVIDER NOTE - NSFOLLOWUPINSTRUCTIONS_ED_ALL_ED_FT
- Do not pick at or rub your nose hard as these can cause bleeding, particularly when you are taking a blood thinning medication such as Eliquis (Apixaban)    If your nose starts bleeding please do the following:  - Lean forward and pinch the soft fleshy part of your nose  - Hold pressure for 15 minutes  - Do NOT let go to check on the bleeding  - Once 15 minutes is up you can check.   - Do not blow out the clots  - If you re-bleed try to hold pressure again  - If after this you continue to bleed return to the emergency department  - If you become lightheaded/weak return to the emergency department    Follow up with  your regular doctor within 1 week

## 2025-06-13 NOTE — ED PROVIDER NOTE - ATTENDING CONTRIBUTION TO CARE
I have personally performed a history and physical examination of the patient and discussed management with the resident as well as the patient.  I reviewed the resident's note and agree with the documented findings and plan of care.  I have authored and modified critical sections of the Provider Note, including but not limited to HPI, Physical Exam and MDM.    85 year old male with PMHx HTN, DM, COPD on home O2 3LNC, CHF, DVT on Eliquis presenting for evaluation of nosebleed. Patient states he awoke around 5AM, picked his nose, then it started bleeding. did not hold pressure, bleeding did not stop, so came to ED. uses 3L via NC at home, believes non-humidified. R anterior nare epistaxis visualized. Will provided symptomatic control as needed. Otherwise asymptomatic. Consider nasal packing pending response. Likely outpatient follow up.

## 2025-06-13 NOTE — ED PROVIDER NOTE - CLINICAL SUMMARY MEDICAL DECISION MAKING FREE TEXT BOX
85 year old male with hx DVT on Eliquis presenting for evaluation epistaxis beginning around 5AM after picking his nose. did not hold pressure. presents for continued bleeding. small amount of oozing from right nostril on exam. 85 year old male with hx DVT on Eliquis presenting for evaluation epistaxis beginning around 5AM after picking his nose. did not hold pressure. presents for continued bleeding. small amount of oozing from right nostril on exam.    2 txa soaked cotton ball applied to right nostril x 30min with good hemostasis. observed further without re-bleed.   supportive care advised stable for dc home.

## 2025-06-13 NOTE — ED PROVIDER NOTE - PHYSICAL EXAMINATION
Gen: very pleasant, well appearing, no acute distress  Head: normocephalic, atraumatic  EENT: (+)mild oozing from right nostril, no visible area of active bleed  Lung: no increased work of breathing,  speaking in full sentences without distress  CV: regular rate, regular rhythm  Abd: soft, non-tender, non-distended  MSK: No visible deformities, full range of motion in all 4 extremities  Neuro: Awake, alert, clear speech, no facial asymmetry, moving all extremities against gravity

## 2025-06-13 NOTE — ED ADULT NURSE NOTE - CHIEF COMPLAINT QUOTE
Pt BIBA from home, c/o nosebleed since 0430, on eliquis, on O2 nasal cannula 3L daily, room air O2 89%

## 2025-06-13 NOTE — ED PROVIDER NOTE - PATIENT PORTAL LINK FT
You can access the FollowMyHealth Patient Portal offered by [facility] by registering at the following website: [email or web address]. By joining [facility]’s FollowMyHealth portal, you will also be able to view your health information using other applications (apps) compatible with our system.

## 2025-06-13 NOTE — ED PROVIDER NOTE - OBJECTIVE STATEMENT
85 year old male with PMHx HTN, DM, COPD on home O2 3LNC, CHF, DVT on Eliquis presenting for evaluation of nosebleed. Patient states he awoke around 5AM, picked his nose, then it started bleeding. did not hold pressure, bleeding did not stop, so came to ED. uses 3L via NC at home. denies falling, other trauma, other complaints.

## 2025-06-13 NOTE — ED ADULT NURSE NOTE - OBJECTIVE STATEMENT
Pt is an 85y M, AOx4, presenting w/epistaxis. Pt states sx began this morning at 0500 when he "picked a scab." Pt has hx of epistaxis. Pt denies pain at this time, complains that "blood is running into my mouth," airway patent, pt able to speak in full sentences. Pt denies other complaints or sx at this time. PMH COPD, HTN, DM. Pt baseline 3LNC o2 at home, placed on 3L simple mask for ease of breathing. Pt notes he had a fall x4 days ago, endorses hitting back of his head, pt did not seek eval. Pt currently on Eliquis. Resp even and unlabored. Bed locked and in lowest position.

## 2025-06-13 NOTE — ED ADULT NURSE NOTE - SUICIDE SCREENING QUESTION 2
Requested Prescriptions     Pending Prescriptions Disp Refills    cyanocobalamin 1000 MCG/ML injection [Pharmacy Med Name: CYANOCOBALAMIN 1000 MCG/ML 1000 Solution] 1 mL 11     Sig: INJECT 1 ML INTO THE MUSCLE EVERY 30 DAYS
No

## 2025-06-20 NOTE — PROGRESS NOTE ADULT - PROBLEM/PLAN-2
DISPLAY PLAN FREE TEXT
Jose Carlos Bonilla

## 2025-06-25 ENCOUNTER — RX RENEWAL (OUTPATIENT)
Age: 85
End: 2025-06-25

## 2025-07-29 ENCOUNTER — APPOINTMENT (OUTPATIENT)
Dept: HEART FAILURE | Facility: CLINIC | Age: 85
End: 2025-07-29
Payer: MEDICARE

## 2025-07-29 ENCOUNTER — APPOINTMENT (OUTPATIENT)
Dept: CARDIOLOGY | Facility: CLINIC | Age: 85
End: 2025-07-29
Payer: MEDICARE

## 2025-07-29 VITALS
BODY MASS INDEX: 39.96 KG/M2 | SYSTOLIC BLOOD PRESSURE: 108 MMHG | HEART RATE: 88 BPM | OXYGEN SATURATION: 97 % | DIASTOLIC BLOOD PRESSURE: 68 MMHG | WEIGHT: 295 LBS | HEIGHT: 72 IN

## 2025-07-29 DIAGNOSIS — I48.92 UNSPECIFIED ATRIAL FLUTTER: ICD-10-CM

## 2025-07-29 DIAGNOSIS — I50.9 HEART FAILURE, UNSPECIFIED: ICD-10-CM

## 2025-07-29 PROCEDURE — 93306 TTE W/DOPPLER COMPLETE: CPT

## 2025-07-29 PROCEDURE — 99214 OFFICE O/P EST MOD 30 MIN: CPT
